# Patient Record
Sex: MALE | Race: WHITE | Employment: OTHER | URBAN - METROPOLITAN AREA
[De-identification: names, ages, dates, MRNs, and addresses within clinical notes are randomized per-mention and may not be internally consistent; named-entity substitution may affect disease eponyms.]

---

## 2018-06-08 ENCOUNTER — OFFICE VISIT (OUTPATIENT)
Dept: GERIATRIC MEDICINE | Age: 83
End: 2018-06-08

## 2018-06-08 VITALS
BODY MASS INDEX: 22.91 KG/M2 | HEART RATE: 75 BPM | RESPIRATION RATE: 18 BRPM | WEIGHT: 146 LBS | DIASTOLIC BLOOD PRESSURE: 62 MMHG | OXYGEN SATURATION: 97 % | HEIGHT: 67 IN | SYSTOLIC BLOOD PRESSURE: 112 MMHG | TEMPERATURE: 98.1 F

## 2018-06-08 VITALS
HEART RATE: 75 BPM | HEIGHT: 67 IN | DIASTOLIC BLOOD PRESSURE: 62 MMHG | BODY MASS INDEX: 22.91 KG/M2 | TEMPERATURE: 98.1 F | RESPIRATION RATE: 20 BRPM | OXYGEN SATURATION: 97 % | WEIGHT: 146 LBS | SYSTOLIC BLOOD PRESSURE: 112 MMHG

## 2018-06-08 DIAGNOSIS — N32.89 BLADDER SPASMS: ICD-10-CM

## 2018-06-08 DIAGNOSIS — Z76.89 ENCOUNTER TO ESTABLISH CARE WITH NEW DOCTOR: ICD-10-CM

## 2018-06-08 DIAGNOSIS — N30.01 ACUTE CYSTITIS WITH HEMATURIA: ICD-10-CM

## 2018-06-08 DIAGNOSIS — R79.9 ABNORMAL FINDING OF BLOOD CHEMISTRY: ICD-10-CM

## 2018-06-08 DIAGNOSIS — R81 GLUCOSE FOUND IN URINE ON EXAMINATION: ICD-10-CM

## 2018-06-08 DIAGNOSIS — R35.0 URINARY FREQUENCY: Primary | ICD-10-CM

## 2018-06-08 DIAGNOSIS — Z78.9 FULL CODE STATUS: ICD-10-CM

## 2018-06-08 DIAGNOSIS — R93.89 ABNORMAL FINDINGS ON DIAGNOSTIC IMAGING OF OTHER SPECIFIED BODY STRUCTURES: ICD-10-CM

## 2018-06-08 PROBLEM — I10 ESSENTIAL HYPERTENSION: Chronic | Status: ACTIVE | Noted: 2018-06-08

## 2018-06-08 PROBLEM — E78.2 MIXED HYPERLIPIDEMIA: Chronic | Status: ACTIVE | Noted: 2018-06-08

## 2018-06-08 PROBLEM — M19.90 CHRONIC OSTEOARTHRITIS: Chronic | Status: ACTIVE | Noted: 2018-06-08

## 2018-06-08 PROBLEM — I25.111 CORONARY ARTERY DISEASE INVOLVING NATIVE CORONARY ARTERY OF NATIVE HEART WITH ANGINA PECTORIS WITH DOCUMENTED SPASM (HCC): Chronic | Status: ACTIVE | Noted: 2018-06-08

## 2018-06-08 LAB
BILIRUB UR QL STRIP: NEGATIVE
GLUCOSE UR-MCNC: NORMAL MG/DL
KETONES P FAST UR STRIP-MCNC: NEGATIVE MG/DL
PH UR STRIP: 5 [PH] (ref 4.6–8)
PROT UR QL STRIP: NORMAL
SP GR UR STRIP: 1.01 (ref 1–1.03)
UA UROBILINOGEN AMB POC: NORMAL (ref 0.2–1)
URINALYSIS CLARITY POC: NORMAL
URINALYSIS COLOR POC: YELLOW
URINE BLOOD POC: NORMAL
URINE LEUKOCYTES POC: NORMAL
URINE NITRITES POC: NEGATIVE

## 2018-06-08 RX ORDER — GUAIFENESIN 100 MG/5ML
81 LIQUID (ML) ORAL DAILY
COMMUNITY
End: 2018-06-08 | Stop reason: CLARIF

## 2018-06-08 RX ORDER — PHENAZOPYRIDINE HYDROCHLORIDE 100 MG/1
100 TABLET, FILM COATED ORAL
Qty: 9 TAB | Refills: 0 | Status: SHIPPED | OUTPATIENT
Start: 2018-06-08 | End: 2018-06-11 | Stop reason: ALTCHOICE

## 2018-06-08 RX ORDER — CARVEDILOL 3.12 MG/1
TABLET ORAL 2 TIMES DAILY WITH MEALS
COMMUNITY
End: 2018-06-08 | Stop reason: CLARIF

## 2018-06-08 RX ORDER — NITROFURANTOIN 25; 75 MG/1; MG/1
100 CAPSULE ORAL 2 TIMES DAILY
Qty: 14 CAP | Refills: 0 | Status: SHIPPED | OUTPATIENT
Start: 2018-06-08 | End: 2018-06-15 | Stop reason: ALTCHOICE

## 2018-06-08 NOTE — PROGRESS NOTES
ADVISED PATIENT OF THE FOLLOWING HEALTH MAINTAINCE DUE  Health Maintenance Due   Topic Date Due    DTaP/Tdap/Td series (1 - Tdap) [birth year]/1949    ZOSTER VACCINE AGE 60>  11/10/1987    GLAUCOMA SCREENING Q2Y  01/10/1993    MEDICARE YEARLY EXAM  06/08/2018      Chief Complaint   Patient presents with    Urgency     Patient being seen for urinary frequency and burning. 1. Have you been to the ER, urgent care clinic since your last visit? Hospitalized since your last visit? Shantal Dong has been seen by outside provider, will request notes    2. Have you seen or consulted any other health care providers outside of the [address] [place] [address] since your last visit? Include any DEXA scan, mammography  or colon screening. No    3. Do you have an Advance Directive on file? no    4. Do you have a DNR on file? Full        Patient is accompanied by self and wife I have received verbal consent from Antonio Darden to discuss any/all medical information while they are present in the room.

## 2018-06-08 NOTE — MR AVS SNAPSHOT
[address] [address] 
[phone] Patient: Dwight Mahoney MRN: [id number] TNM:[id number] Visit Information Date & Time Provider Department Dept. Phone Encounter #  
 6/8/2018 10:00 AM Aleisha Abad, [address] [facility] [phone] [id number] Follow-up Instructions Return in about 3 days (around 6/11/2018) for with the NP, regarding your treatment/orders today. Upcoming Health Maintenance Date Due DTaP/Tdap/Td series (1 - Tdap) 1[birth year]10/1949 ZOSTER VACCINE AGE 60> 11/10/1987 GLAUCOMA SCREENING Q2Y 1/10/1993 MEDICARE YEARLY EXAM 6/8/2018 Influenza Age 5 to Adult 8/1/2018 Pneumococcal 65+ High/Highest Risk (2 of 2 - PPSV23) 8/3/2018 Allergies as of 6/8/2018  Review Complete On: 6/8/2018 By: Aleisha Abad NP Severity Noted Reaction Type Reactions Ace Inhibitors  10/13/2014    Hives Lisinopril  10/13/2014    Hives HIVES, ORAL SWELLING Current Immunizations  Never Reviewed No immunizations on file. Not reviewed this visit You Were Diagnosed With   
  
 Codes Comments Urinary frequency    -  Primary ICD-10-CM: R35.0 ICD-9-CM: 788.41 Encounter to establish care with new doctor     ICD-10-CM: Z76.89 
ICD-9-CM: V65.8 Bladder spasms     ICD-10-CM: N32.89 ICD-9-CM: 596.89 Acute cystitis with hematuria     ICD-10-CM: N30.01 
ICD-9-CM: 595.0 Glucose found in urine on examination     ICD-10-CM: R81 
ICD-9-CM: 791.5 Abnormal finding of blood chemistry     ICD-10-CM: R79.9 ICD-9-CM: 790.6 Abnormal findings on diagnostic imaging of other specified body structures     ICD-10-CM: R93.8 ICD-9-CM: 793.99 Vitals BP Pulse Temp Resp Height(growth percentile) Weight(growth percentile) 112/62 (BP 1 Location: Left arm, BP Patient Position: Sitting) 75 98.1 °F (36.7 °C) (Oral) 20 5' 6.93\" (1.7 m) 146 lb (66.2 kg) SpO2 BMI Smoking Status 97% 22.91 kg/m2 Never Smoker BMI and BSA Data Body Mass Index Body Surface Area  
 22.91 kg/m 2 1.77 m 2 Preferred Pharmacy Pharmacy Name Phone [facility] [address], [facility] [phone] Your Updated Medication List  
  
   
This list is accurate as of 6/8/18  2:32 PM.  Always use your most recent med list.  
  
  
  
  
 acetaminophen-codeine 300-30 mg per tablet Commonly known as:  TYLENOL-CODEINE #3 Take 2 Tabs by mouth every four (4) hours as needed for Pain. Max Daily Amount: 12 Tabs. aspirin delayed-release 81 mg tablet Take 81 mg by mouth daily. atorvastatin 40 mg tablet Commonly known as:  LIPITOR Take 40 mg by mouth every evening. AVODART 0.5 mg capsule Generic drug:  dutasteride Take 0.5 mg by mouth daily. CARDURA 8 mg tablet Generic drug:  doxazosin Take 4 mg by mouth daily. AT BEDTIME  
  
 carvedilol 3.125 mg tablet Commonly known as:  Layo Dole Take  by mouth two (2) times daily (with meals). CLARITIN 10 mg tablet Generic drug:  loratadine Take 10 mg by mouth daily as needed for Allergies. losartan 100 mg tablet Commonly known as:  COZAAR Take 100 mg by mouth daily. nitrofurantoin (macrocrystal-monohydrate) 100 mg capsule Commonly known as:  MACROBID Take 1 Cap by mouth two (2) times a day for 7 days. Indications: BACTERIAL URINARY TRACT INFECTION  
  
 phenazopyridine 100 mg tablet Commonly known as:  PYRIDIUM Take 1 Tab by mouth three (3) times daily (after meals) for 3 days. Indications: URINARY TRACT IRRITATION Prescriptions Sent to Pharmacy Refills  
 phenazopyridine (PYRIDIUM) 100 mg tablet 0 Sig: Take 1 Tab by mouth three (3) times daily (after meals) for 3 days. Indications: URINARY TRACT IRRITATION  Class: Normal  
 Pharmacy: [facility] [address] [place] [address] of [address] [place] [address] Ph #: [phone] Route: Oral  
 nitrofurantoin, macrocrystal-monohydrate, (MACROBID) 100 mg capsule 0 Sig: Take 1 Cap by mouth two (2) times a day for 7 days. Indications: BACTERIAL URINARY TRACT INFECTION Class: Normal  
 Pharmacy: [facility] [address] [place] [address], [facility] of [address] [place] [address] Ph #: [phone] Route: Oral  
  
We Performed the Following AMB POC URINALYSIS DIP STICK MANUAL W/O MICRO [07423 CPT(R)] AMMONIA [id number] CPT(R)] CBC WITH AUTOMATED DIFF [86991 CPT(R)] COLLECTION VENOUS BLOOD,VENIPUNCTURE [id number] CPT(R)] HEMOGLOBIN A1C WITH EAG [64206 CPT(R)] LACTIC ACID [id number] CPT(R)] LIPID PANEL [64324 CPT(R)] METABOLIC PANEL, COMPREHENSIVE [65420 CPT(R)] TSH REFLEX TO T4 [52240 CPT(R)] UA/M W/RFLX CULTURE, ROUTINE [[id number] Custom] Follow-up Instructions Return in about 3 days (around 6/11/2018) for with the NP, regarding your treatment/orders today. Patient Instructions Urinary Tract Infections in Men: Care Instructions Your Care Instructions A urinary tract infection, or UTI, is a general term for an infection anywhere between the kidneys and the tip of the penis. UTIs can also be a result of a prostate problem. Most cause pain or burning when you urinate. Most UTIs are caused by bacteria and can be cured with antibiotics. It is important to complete your treatment so that the infection does not get worse. Follow-up care is a key part of your treatment and safety. Be sure to make and go to all appointments, and call your doctor if you are having problems. It's also a good idea to know your test results and keep a list of the medicines you take. How can you care for yourself at home? · Take your antibiotics as prescribed.  Do not stop taking them just because you feel better. You need to take the full course of antibiotics. · Take your medicines exactly as prescribed. Your doctor may have prescribed a medicine, such as phenazopyridine (Pyridium), to help relieve pain when you urinate. This turns your urine orange. You may stop taking it when your symptoms get better. But be sure to take all of your antibiotics, which treat the infection. · Drink extra water for the next day or two. This will help make the urine less concentrated and help wash out the bacteria causing the infection. (If you have kidney, heart, or liver disease and have to limit your fluids, talk with your doctor before you increase your fluid intake.) · Avoid drinks that are carbonated or have caffeine. They can irritate the bladder. · Urinate often. Try to empty your bladder each time. · To relieve pain, take a hot bath or lay a heating pad (set on low) over your lower belly or genital area. Never go to sleep with a heating pad in place. To help prevent UTIs · Drink plenty of fluids, enough so that your urine is light yellow or clear like water. If you have kidney, heart, or liver disease and have to limit fluids, talk with your doctor before you increase the amount of fluids you drink. · Urinate when you have the urge. Do not hold your urine for a long time. Urinate before you go to sleep. · Keep your penis clean. Catheter care If you have a drainage tube (catheter) in place, the following steps will help you care for it. · Always wash your hands before and after touching your catheter. · Check the area around the urethra for inflammation or signs of infection. Signs of infection include irritated, swollen, red, or tender skin, or pus around the catheter. · Clean the area around the catheter with soap and water two times a day. Dry with a clean towel afterward. · Do not apply powder or lotion to the skin around the catheter.  
To empty the urine collection bag 
 · Wash your hands with soap and water. · Without touching the drain spout, remove the spout from its sleeve at the bottom of the collection bag. Open the valve on the spout. · Let the urine flow out of the bag and into the toilet or a container. Do not let the tubing or drain spout touch anything. · After you empty the bag, clean the end of the drain spout with tissue and water. Close the valve and put the drain spout back into its sleeve at the bottom of the collection bag. · Wash your hands with soap and water. When should you call for help? Call your doctor now or seek immediate medical care if: 
? · Symptoms such as a fever, chills, nausea, or vomiting get worse or happen for the first time. ? · You have new pain in your back just below your rib cage. This is called flank pain. ? · There is new blood or pus in your urine. ? · You are not able to take or keep down your antibiotics. ? Watch closely for changes in your health, and be sure to contact your doctor if: 
? · You are not getting better after taking an antibiotic for 2 days. ? · Your symptoms go away but then come back. Where can you learn more? Go to http://thomas-jason.info/. Enter V219 in the search box to learn more about \"Urinary Tract Infections in Men: Care Instructions. \" Current as of: May 12, 2017 Content Version: 11.4 © 8033-2295 [employer]. Care instructions adapted under license by [employer] (which disclaims liability or warranty for this information). If you have questions about a medical condition or this instruction, always ask your healthcare professional. [place] [address] any warranty or liability for your use of this information. Introducing [facility] & HEALTH SERVICES! [facility] introduces [employer] patient portal. Now you can access parts of your medical record, email your doctor's office, and request medication refills online. 1. In your internet browser, go to [email or web address] 2. Click on the First Time User? Click Here link in the Sign In box. You will see the New Member Sign Up page. 3. Enter your [employer] Access Code exactly as it appears below. You will not need to use this code after youve completed the sign-up process. If you do not sign up before the expiration date, you must request a new code. · [employer] Access Code: [email or web address] Expires: 9/6/2018 11:43 AM 
 
4. Enter the last four digits of your Social Security Number (xxxx) and Date of Birth (mm/dd/yyyy) as indicated and click Submit. You will be taken to the next sign-up page. 5. Create a [employer] ID. This will be your [employer] login ID and cannot be changed, so think of one that is secure and easy to remember. 6. Create a [employer] password. You can change your password at any time. 7. Enter your Password Reset Question and Answer. This can be used at a later time if you forget your password. 8. Enter your e-mail address. You will receive e-mail notification when new information is available in [address] 9. Click Sign Up. You can now view and download portions of your medical record. 10. Click the Download Summary menu link to download a portable copy of your medical information. If you have questions, please visit the Frequently Asked Questions section of the [employer] website. Remember, [employer] is NOT to be used for urgent needs. For medical emergencies, dial 911. Now available from your iPhone and Android! Please provide this summary of care documentation to your next provider. If you have any questions after today's visit, please call [phone].

## 2018-06-08 NOTE — LETTER
6/8/2018 2:16 PM 
 
Mr. Morro Nino [address] Rowena Johnson E - [address] [facility] [address] Discharge Instructions/Plan of Care 06/08/18 
- If you do not receive your medications it is your responsibility to let us know or contact your pharmacy. Any questions do not hesitate to call Olga aKur [facility] Nurse at [phone]. Discontinued Medication/Treatment: No medications have been discontinued today. Treatment Plan: 1. Urinary frequency 2. Encounter to establish care with new doctor 3. Bladder spasms 4. Acute cystitis with hematuria 5. Glucose found in urine on examination 6. Abnormal finding of blood chemistry 7. Abnormal findings on diagnostic imaging of other specified body structures Medications Ordered - [facility] will deliver to [facility] 6/8/18 phenazopyridine (PYRIDIUM) 100 mg tablet  (bladder spasm medication) Sig: Take 1 Tab by mouth three (3) times daily (after meals) for 3 days. Indications: URINARY TRACT IRRITATION/SPASMS Dispense:  9 Tab  
   
nitrofurantoin, macrocrystal-monohydrate, (MACROBID) 100 mg capsule  (antibiotic) Sig: Take 1 Cap by mouth two (2) times a day for 7 days. Indications: BACTERIAL URINARY TRACT INFECTION Dispense:  14 Cap Please drink and flush with oral fluids as you are mildly dehydrated and need to drink more. Return Visit/Follow Up:   
 
Return on Monday 6/11/2018 for follow up from today's visit as well as we can complete your Medicare Wellness Visit that is due. Please bring a copy of your advanced directives, living will, or current do not resuscitate order you have on hand. We appreciate you allowing us to participate in your health care.  
Eder Mccain NP

## 2018-06-08 NOTE — PATIENT INSTRUCTIONS

## 2018-06-09 LAB
ALBUMIN SERPL-MCNC: 3.8 G/DL (ref 3.2–4.6)
ALBUMIN/GLOB SERPL: 1.7 {RATIO} (ref 1.2–2.2)
ALP SERPL-CCNC: 106 IU/L (ref 39–117)
ALT SERPL-CCNC: 26 IU/L (ref 0–44)
AMMONIA PLAS-MCNC: NORMAL UG/DL (ref 27–102)
AST SERPL-CCNC: 24 IU/L (ref 0–40)
BASOPHILS # BLD AUTO: 0 X10E3/UL (ref 0–0.2)
BASOPHILS NFR BLD AUTO: 0 %
BILIRUB SERPL-MCNC: 0.9 MG/DL (ref 0–1.2)
BUN SERPL-MCNC: 21 MG/DL (ref 10–36)
BUN/CREAT SERPL: 23 (ref 10–24)
CALCIUM SERPL-MCNC: 9 MG/DL (ref 8.6–10.2)
CHLORIDE SERPL-SCNC: 98 MMOL/L (ref 96–106)
CHOLEST SERPL-MCNC: 99 MG/DL (ref 100–199)
CO2 SERPL-SCNC: 23 MMOL/L (ref 18–29)
CREAT SERPL-MCNC: 0.92 MG/DL (ref 0.76–1.27)
EOSINOPHIL # BLD AUTO: 0.1 X10E3/UL (ref 0–0.4)
EOSINOPHIL NFR BLD AUTO: 2 %
ERYTHROCYTE [DISTWIDTH] IN BLOOD BY AUTOMATED COUNT: 13.3 % (ref 12.3–15.4)
EST. AVERAGE GLUCOSE BLD GHB EST-MCNC: 137 MG/DL
GFR SERPLBLD CREATININE-BSD FMLA CKD-EPI: 73 ML/MIN/1.73
GFR SERPLBLD CREATININE-BSD FMLA CKD-EPI: 84 ML/MIN/1.73
GLOBULIN SER CALC-MCNC: 2.2 G/DL (ref 1.5–4.5)
GLUCOSE SERPL-MCNC: 192 MG/DL (ref 65–99)
HBA1C MFR BLD: 6.4 % (ref 4.8–5.6)
HCT VFR BLD AUTO: 40.4 % (ref 37.5–51)
HDLC SERPL-MCNC: 34 MG/DL
HGB BLD-MCNC: 13.2 G/DL (ref 13–17.7)
IMM GRANULOCYTES # BLD: 0 X10E3/UL (ref 0–0.1)
IMM GRANULOCYTES NFR BLD: 0 %
LDLC SERPL CALC-MCNC: 52 MG/DL (ref 0–99)
LYMPHOCYTES # BLD AUTO: 0.7 X10E3/UL (ref 0.7–3.1)
LYMPHOCYTES NFR BLD AUTO: 12 %
MCH RBC QN AUTO: 29.7 PG (ref 26.6–33)
MCHC RBC AUTO-ENTMCNC: 32.7 G/DL (ref 31.5–35.7)
MCV RBC AUTO: 91 FL (ref 79–97)
MONOCYTES # BLD AUTO: 0.7 X10E3/UL (ref 0.1–0.9)
MONOCYTES NFR BLD AUTO: 13 %
NEUTROPHILS # BLD AUTO: 4.1 X10E3/UL (ref 1.4–7)
NEUTROPHILS NFR BLD AUTO: 73 %
PLATELET # BLD AUTO: 276 X10E3/UL (ref 150–379)
POTASSIUM SERPL-SCNC: 4.4 MMOL/L (ref 3.5–5.2)
PROT SERPL-MCNC: 6 G/DL (ref 6–8.5)
RBC # BLD AUTO: 4.45 X10E6/UL (ref 4.14–5.8)
SODIUM SERPL-SCNC: 135 MMOL/L (ref 134–144)
TRIGL SERPL-MCNC: 66 MG/DL (ref 0–149)
TSH SERPL DL<=0.005 MIU/L-ACNC: 1.58 UIU/ML (ref 0.45–4.5)
VLDLC SERPL CALC-MCNC: 13 MG/DL (ref 5–40)
WBC # BLD AUTO: 5.7 X10E3/UL (ref 3.4–10.8)

## 2018-06-11 ENCOUNTER — OFFICE VISIT (OUTPATIENT)
Dept: GERIATRIC MEDICINE | Age: 83
End: 2018-06-11

## 2018-06-11 VITALS
WEIGHT: 146 LBS | DIASTOLIC BLOOD PRESSURE: 78 MMHG | OXYGEN SATURATION: 97 % | HEIGHT: 67 IN | HEART RATE: 85 BPM | SYSTOLIC BLOOD PRESSURE: 130 MMHG | RESPIRATION RATE: 20 BRPM | BODY MASS INDEX: 22.91 KG/M2 | TEMPERATURE: 97.7 F

## 2018-06-11 DIAGNOSIS — R35.0 URINARY FREQUENCY: Primary | ICD-10-CM

## 2018-06-11 DIAGNOSIS — N30.01 ACUTE CYSTITIS WITH HEMATURIA: ICD-10-CM

## 2018-06-11 DIAGNOSIS — Z71.2 ENCOUNTER TO DISCUSS TEST RESULTS: ICD-10-CM

## 2018-06-11 DIAGNOSIS — R81 GLUCOSE FOUND IN URINE ON EXAMINATION: ICD-10-CM

## 2018-06-11 DIAGNOSIS — Z71.89 ENCOUNTER FOR FAMILY CONFERENCE WITH PATIENT PRESENT: ICD-10-CM

## 2018-06-11 DIAGNOSIS — Z71.89 ACP (ADVANCE CARE PLANNING): ICD-10-CM

## 2018-06-11 DIAGNOSIS — R79.9 ABNORMAL FINDING OF BLOOD CHEMISTRY: ICD-10-CM

## 2018-06-11 DIAGNOSIS — Z00.00 MEDICARE ANNUAL WELLNESS VISIT, SUBSEQUENT: ICD-10-CM

## 2018-06-11 DIAGNOSIS — Z78.9 FULL CODE STATUS: ICD-10-CM

## 2018-06-11 LAB
BILIRUB UR QL STRIP: NEGATIVE
GLUCOSE UR-MCNC: NORMAL MG/DL
KETONES P FAST UR STRIP-MCNC: NEGATIVE MG/DL
LACTATE SERPL-MCNC: 9.5 MG/DL (ref 4.8–25.7)
PH UR STRIP: 5 [PH] (ref 4.6–8)
PROT UR QL STRIP: NORMAL
SP GR UR STRIP: 1.01 (ref 1–1.03)
UA UROBILINOGEN AMB POC: NORMAL (ref 0.2–1)
URINALYSIS CLARITY POC: CLEAR
URINALYSIS COLOR POC: NORMAL
URINE BLOOD POC: NORMAL
URINE LEUKOCYTES POC: NEGATIVE
URINE NITRITES POC: POSITIVE

## 2018-06-11 RX ORDER — SAME BUTANEDISULFONATE/BETAINE 400-600 MG
250 POWDER IN PACKET (EA) ORAL DAILY
Qty: 90 CAP | Refills: 2 | Status: SHIPPED | OUTPATIENT
Start: 2018-06-11 | End: 2019-03-29 | Stop reason: ALTCHOICE

## 2018-06-11 NOTE — LETTER
6/11/2018 1:22 PM 
 
Patient:  Patricia Avelar YOB: 1928 Date of Visit: 6/11/2018 Dear MD [address] [facility] [address] [address] VIA Facsimile: [phone] Claude Prima, MD 
[address] 
[facility] [address] [address] VIA In Basket Karen Bender MD 
[address] [facility] 7 [address] VIA Facsimile: [phone] 
 : 
 
 
I am seeing Mr. Caryn Beauchamp for primary care here in [place], [place]. Below are the relevant portions  
 
of my assessment and plan of care. If you have questions, please do not hesitate to call me. I look forward to following Mr. Craft along  
 
with you.  
 
 
 
Sincerely, 
 
 
Vel Killian NP

## 2018-06-11 NOTE — PROGRESS NOTES
Reason for Visit:      Chief Complaint   Patient presents with   [facility] Visit     Patient here for medicare wellness visit.  Follow-up     Patient here for follow up with UTI     History of Present Illness:   Mayra Jamison is a 80 y.o. male geriatric patient who presents today for scheduled follow up after Friday's visit when he was found to be moderately dehydrated with a raging urinary tract infection. I am still awaiting medical records to piece together his medical history and care as it appears to be complex. He has a history of bladder cancer and recently in May 2018 had a cystoscopy done at [facility]. I do not have the results of this test at this time time. Mr. Sol Elder, his wife, and his daughter Bronwyn Starkey are present Vernon Weeks is via phone conference) for this visit. Urinary Frequency: per patient he feels better. He states the urgency has slowed down and he can now make it about every 3 hours to the bathroom. He still has not noted any blood or abnormalities with the visual appearance of the urine except for the darkening of color due to the pyridium. He has been taking the Macrobid without side effects or harm. I have advised him to continue with the medication until the culture return and then if need be we will change him to something that is noted on the culture but this should do the trick. He states he is getting more sleep now and seems to be getting better. His wife reports that he is still up multiple items per night and he has complained this weekend of a reduction in his stream. His daughter is present on the phone and reports a cystoscopy from May 2018 results showed to be inconclusive with a \"red\" spot noted on exam. Directions were to \"watch\" it and return in 3 months for another test. I have requested these records from [facility] and Bronwyn Aspenkathryn the daughter is trying to get him in to see dr. Reena Patel as soon as possible.      Laboratory Results Reviewed: CBC is stable without anemia, infection or dehydration. CMP shows an elevated peripheral blood glucose at 192. He was not fasting for this sample. Other values are stable without kidney insufficiency, liver, or electrolyte abnormalities. I would like to comapre them to previous labs for a better undertsanding of his chronic illnesses, we are still awaiting records that were requested on Friday. Thyroid is functioning well without concern. Lipids are low. He is on Lipitor 40 mg daily and I will check with Dr. Ebonie Khan (cardiologist) on hopefully reducing the statin as his coronary artery disease has been stable for 25+ years and the effects of statin on this population is sometimes worse then its benefits. I will leave this up to Dr. Ebonie Khan to advised. Hemoglobin A 1 C is elevated but not bad. It is showing some increase but with his age and medical conditions he is not a candidate for treatment at this time as we can cause him to be more weakness and fall if we set his target number lower. He is spilling glucose in his urine still as I did a POC dipstick in office and he has 1000+ glucose in urine. I will bring this up to Dr. Chris Alexander or whomever is treating him at [facility] as I have left a message for a provider to call me back. I have recommended use of a probiotic daily as this could help with bowels and urinary health. He declines using yogurt, cottage cheese, ect. .. He and his wife request a pill form. I will order Florastor to be delivered today from the pharmacy. I have asked that he return in 1 month unless he has further issues. I have ordered a dietary consult here at [facility] to help with navigating through the food choices in the dinning mcdaniels. They are open to having this meeting. Discussed the importance of not over medicating him with a medication for each ailment and strategically choosing the treatments we use to benefit him on the quality of life. They agree. Diagnosis/Treatment Plan:     The following orders have been placed for treatment of the diagnoses above:    ICD-10-CM ICD-9-CM    1. Urinary frequency R35.0 788.41 REFERRAL TO DIETITIAN      Saccharomyces boulardii (FLORASTOR) 250 mg capsule      AMB POC URINALYSIS DIP STICK MANUAL W/O MICRO   2. Acute cystitis with hematuria N30.01 595.0 REFERRAL TO DIETITIAN      Saccharomyces boulardii (FLORASTOR) 250 mg capsule      AMB POC URINALYSIS DIP STICK MANUAL W/O MICRO   3. Glucose found in urine on examination R81 791.5 REFERRAL TO DIETITIAN      Saccharomyces boulardii (FLORASTOR) 250 mg capsule      AMB POC URINALYSIS DIP STICK MANUAL W/O MICRO   4. Abnormal finding of blood chemistry  R79.9 790.6 REFERRAL TO DIETITIAN      Saccharomyces boulardii (FLORASTOR) 250 mg capsule      AMB POC URINALYSIS DIP STICK MANUAL W/O MICRO   5. Encounter to discuss test results Z71.89 V65.49 REFERRAL TO DIETITIAN      Saccharomyces boulardii (FLORASTOR) 250 mg capsule      AMB POC URINALYSIS DIP STICK MANUAL W/O MICRO   6. Encounter for family conference with patient present Z71.89 V65.49 FULL CODE      REFERRAL TO DIETITIAN      Saccharomyces boulardii (FLORASTOR) 250 mg capsule      AMB POC URINALYSIS DIP STICK MANUAL W/O MICRO   7. Medicare annual wellness visit, subsequent Z00.00 V70.0 FULL CODE   8. ACP (advance care planning) Z71.89 V65.49 FULL CODE   9. Full code status Z78.9 V49.89 FULL CODE      The following medication/treatments have been discontinued by the provider today:   Medications Discontinued During This Encounter   Medication Reason    phenazopyridine (PYRIDIUM) 100 mg tablet Therapy Completed       Follow Up: Follow-up Disposition:  Return in about 1 month (around 7/11/2018). Subjective: Allergies   Allergen Reactions    Ace Inhibitors Hives    Lisinopril Hives     HIVES, ORAL SWELLING     Prior to Admission medications    Medication Sig Start Date End Date Taking?  Authorizing Provider   Saccharomyces boulardii (FLORASTOR) 250 mg capsule Take 1 Cap by mouth daily. 6/11/18  Yes Kristal De Santiago NP   nitrofurantoin, macrocrystal-monohydrate, (MACROBID) 100 mg capsule Take 1 Cap by mouth two (2) times a day for 7 days. Indications: BACTERIAL URINARY TRACT INFECTION 6/8/18 6/15/18 Yes Kristal De Santiago NP   carvedilol (COREG) 3.125 mg tablet Take  by mouth two (2) times daily (with meals). Yes Historical Provider   atorvastatin (LIPITOR) 40 mg tablet Take 40 mg by mouth every evening. Yes Historical Provider   losartan (COZAAR) 100 mg tablet Take 100 mg by mouth daily. Yes Historical Provider   aspirin delayed-release 81 mg tablet Take 81 mg by mouth daily. Yes Historical Provider   doxazosin (CARDURA) 8 mg tablet Take 4 mg by mouth daily. AT BEDTIME   Yes Historical Provider   dutaseride (AVODART) 0.5 mg capsule Take 0.5 mg by mouth daily. Yes Historical Provider   acetaminophen-codeine (TYLENOL-CODEINE #3) 300-30 mg per tablet Take 2 Tabs by mouth every four (4) hours as needed for Pain. Max Daily Amount: 12 Tabs. 12/7/15   Kiah Sage III, MD   loratadine (CLARITIN) 10 mg tablet Take 10 mg by mouth daily as needed for Allergies. Historical Provider     Past Medical History:   Diagnosis Date    Arthritis     CAD (coronary artery disease)     HX CABG    Cancer ([facility] [address]     BLADDER    Chronic pain     Essential hypertension 6/8/2018    GERD (gastroesophageal reflux disease)     Hypercholesterolemia     Osteoarthritis of left knee 2/1/2011    Other ill-defined conditions(799.89)     BPH      Past Surgical History:   Procedure Laterality Date    CARDIAC SURG PROCEDURE UNLIST  1999    CABG    HX COLONOSCOPY      HX GI      ESOPH DIL.     HX HEENT      TONSILLECTOMY    HX ORTHOPAEDIC  2008    R KNEE REPLACE    HX ORTHOPAEDIC  2004    ARTHROSCOPY R KNEE    HX ORTHOPAEDIC  2002    ARTHROSCOPY L KNEE    HX OTHER SURGICAL      ENDOSCOPY-X2    HX PROSTATECTOMY      HX TURP  2014, 2015    HX UROLOGICAL  11/02/2015 Transurethral resection of bladder tumor    TOTAL KNEE ARTHROPLASTY  2011    LEFT      Social History   Substance Use Topics    Smoking status: Never Smoker    Smokeless tobacco: Never Used    Alcohol use 2.0 oz/week     4 Glasses of wine per week      Comment: 4-5 OZ WINE DAILY      Family History   Problem Relation Age of Onset    Heart Disease Father     Cancer Mother     Anesth Problems Neg Hx       Latest Laboratory Results:     Lab Results   Component Value Date/Time    WBC 5.7 06/08/2018 02:33 PM    HGB 13.2 06/08/2018 02:33 PM    HCT 40.4 06/08/2018 02:33 PM    PLATELET 291 12/55/7736 02:33 PM    MCV 91 06/08/2018 02:33 PM     Lab Results   Component Value Date/Time    Sodium 135 06/08/2018 02:33 PM    Potassium 4.4 06/08/2018 02:33 PM    Chloride 98 06/08/2018 02:33 PM    CO2 23 06/08/2018 02:33 PM    Anion gap 8 10/15/2015 02:55 PM    Glucose 192 (H) 06/08/2018 02:33 PM    BUN 21 06/08/2018 02:33 PM    Creatinine 0.92 06/08/2018 02:33 PM    BUN/Creatinine ratio 23 06/08/2018 02:33 PM    GFR est AA 84 06/08/2018 02:33 PM    GFR est non-AA 73 06/08/2018 02:33 PM    Calcium 9.0 06/08/2018 02:33 PM    Bilirubin, total 0.9 06/08/2018 02:33 PM    AST (SGOT) 24 06/08/2018 02:33 PM    Alk.  phosphatase 106 06/08/2018 02:33 PM    Protein, total 6.0 06/08/2018 02:33 PM    Albumin 3.8 06/08/2018 02:33 PM    Globulin 2.5 10/15/2015 02:55 PM    A-G Ratio 1.7 06/08/2018 02:33 PM    ALT (SGPT) 26 06/08/2018 02:33 PM     Lab Results   Component Value Date/Time    Cholesterol, total 99 (L) 06/08/2018 02:33 PM    HDL Cholesterol 34 (L) 06/08/2018 02:33 PM    LDL, calculated 52 06/08/2018 02:33 PM    VLDL, calculated 13 06/08/2018 02:33 PM    Triglyceride 66 06/08/2018 02:33 PM     Lab Results   Component Value Date/Time    TSH 1.580 06/08/2018 02:33 PM     Results for orders placed or performed in visit on 06/11/18   AMB POC URINALYSIS DIP STICK MANUAL W/O MICRO     Status: None   Result Value Ref Range Status Color (UA POC) Orange  Final    Clarity (UA POC) Clear  Final    Glucose (UA POC) 4+ Negative Final    Bilirubin (UA POC) Negative Negative Final    Ketones (UA POC) Negative Negative Final    Specific gravity (UA POC) 1.010 1.001 - 1.035 Final    Blood (UA POC) 3+ Negative Final    pH (UA POC) 5.0 4.6 - 8.0 Final    Protein (UA POC) 2+ Negative Final    Urobilinogen (UA POC) 0.2 mg/dL 0.2 - 1 Final    Nitrites (UA POC) Positive Negative Final    Leukocyte esterase (UA POC) Negative Negative Final       Objective:   CODE STATUS: Full  Vitals:    06/11/18 1046   BP: 130/78   Pulse: 85   Resp: 20   Temp: 97.7 °F (36.5 °C)   SpO2: 97%   Weight: 146 lb (66.2 kg)   Height: 5' 6.93\" (1.7 m)     Wt Readings from Last 3 Encounters:   06/11/18 146 lb (66.2 kg)   06/08/18 146 lb (66.2 kg)   06/08/18 146 lb (66.2 kg)     BP Readings from Last 3 Encounters:   06/11/18 130/78   06/08/18 112/62   06/08/18 112/62     Review of Systems   Constitutional: Negative for activity change, appetite change, fatigue and fever. HENT: Negative for congestion, dental problem, hearing loss, postnasal drip, sinus pressure, sneezing, sore throat and trouble swallowing. Eyes: Negative for discharge, redness and visual disturbance. Respiratory: Negative for apnea, cough, chest tightness, shortness of breath and wheezing. Cardiovascular: Negative for chest pain, palpitations and leg swelling. Gastrointestinal: Negative for abdominal distention, abdominal pain, blood in stool, constipation, diarrhea, nausea and vomiting. Endocrine: Positive for polydipsia and polyuria. Genitourinary: Positive for difficulty urinating, frequency and urgency. Negative for dysuria and flank pain. Musculoskeletal: Positive for arthralgias, back pain, gait problem and myalgias. Negative for joint swelling and neck pain. Skin: Negative for color change, pallor, rash and wound.    Allergic/Immunologic: Negative for environmental allergies and food allergies. Neurological: Negative for dizziness, tremors, weakness, light-headedness, numbness and headaches. Hematological: Negative for adenopathy. Psychiatric/Behavioral: Negative for agitation, confusion and sleep disturbance. The patient is not nervous/anxious. Physical Exam   Constitutional: He is oriented to person, place, and time. Vital signs are normal. He appears malnourished. He appears to not be writhing in pain and not dehydrated. He appears healthy. Non-toxic appearance. He does not have a sickly appearance. No distress. Thin, frail, fragile in appearing, elderly,  male. Presents with his wife. Mild flight of ideas and thoughts. HENT:   Head: Normocephalic and atraumatic. Right Ear: Tympanic membrane, external ear and ear canal normal. Decreased hearing is noted. Left Ear: Tympanic membrane, external ear and ear canal normal. Decreased hearing is noted. Nose: Nose normal.   Mouth/Throat: Uvula is midline, oropharynx is clear and moist and mucous membranes are normal. Mucous membranes are not pale, not dry and not cyanotic. No oral lesions. No uvula swelling. No posterior oropharyngeal edema or posterior oropharyngeal erythema. Neck: Trachea normal. No JVD present. Spinous process tenderness and muscular tenderness present. Rigidity present. Decreased range of motion present. No thyromegaly present. Cardiovascular: S1 normal, S2 normal, intact distal pulses and normal pulses. A regularly irregular rhythm present. Bradycardia present. Exam reveals distant heart sounds. Exam reveals no gallop and no friction rub. No murmur heard. No extremity edema is present during today's exam.    Pulmonary/Chest: Effort normal and breath sounds normal.   Abdominal: Soft. Normal appearance and bowel sounds are normal. There is no hepatosplenomegaly. There is no tenderness. There is no CVA tenderness. Musculoskeletal:   Generalized chronic extremity weakness is present. Lymphadenopathy:        Head (right side): No submandibular, no tonsillar and no posterior auricular adenopathy present. Head (left side): No submandibular, no tonsillar and no posterior auricular adenopathy present. He has no cervical adenopathy. Neurological: He is alert and oriented to person, place, and time. He has normal reflexes and intact cranial nerves. He displays weakness, atrophy and abnormal stance. A sensory deficit is present. He exhibits abnormal muscle tone. Coordination and gait abnormal. GCS score is 15. Skin: Skin is warm, dry and intact. No bruising and no rash noted. He is not diaphoretic. No cyanosis. No pallor. Nails show no clubbing. Psychiatric: Mood, memory, affect and judgment normal.   Baseline mood and affect unchanged from normal.      Disclaimer:   Advised Daniel Craft to call back or return to office if symptoms worsen/change/persist. Discussed expected course/resolution/complications of diagnosis in detail with patient. Medication risks/benefits/costs/interactions/alternatives discussed with patient and he was given an after visit summary which includes diagnoses, current medications, & vitals. Daniel Craft expressed understanding with the diagnosis and plan.

## 2018-06-11 NOTE — PROGRESS NOTES
ADVISED PATIENT OF THE FOLLOWING HEALTH MAINTAINCE DUE  Health Maintenance Due   Topic Date Due    DTaP/Tdap/Td series (1 - Tdap) 01/10/1949    ZOSTER VACCINE AGE 60>  11/10/1987    GLAUCOMA SCREENING Q2Y  01/10/1993    MEDICARE YEARLY EXAM  06/08/2018      Chief Complaint   Patient presents with   Lisalinus Almeida Annual Wellness Visit     Patient here for medicare wellness visit.  Follow-up     Patient here for follow up with UTI       1. Have you been to the ER, urgent care clinic since your last visit? Hospitalized since your last visit? No    2. Have you seen or consulted any other health care providers outside of the [facility] since your last visit? Include any DEXA scan, mammography  or colon screening. No    3. Do you have an Advance Directive on file? no    4. Do you have a DNR on file? Full        Patient is accompanied by self and wife I have received verbal consent from Antonio Darden to discuss any/all medical information while they are present in the room.       Results for orders placed or performed in visit on 06/11/18   AMB POC URINALYSIS DIP STICK MANUAL W/O MICRO   Result Value Ref Range    Color (UA POC) Orange     Clarity (UA POC) Clear     Glucose (UA POC) 4+ Negative    Bilirubin (UA POC) Negative Negative    Ketones (UA POC) Negative Negative    Specific gravity (UA POC) 1.010 1.001 - 1.035    Blood (UA POC) 3+ Negative    pH (UA POC) 5.0 4.6 - 8.0    Protein (UA POC) 2+ Negative    Urobilinogen (UA POC) 0.2 mg/dL 0.2 - 1    Nitrites (UA POC) Positive Negative    Leukocyte esterase (UA POC) Negative Negative

## 2018-06-11 NOTE — PROGRESS NOTES
Reason For Visit:   Brianne Toledo is a 80 y.o. male who presents for an annual Medicare Wellness Visit. Patient History:   PSH: Reviewed with patient  Past Surgical History:   Procedure Laterality Date    CARDIAC SURG PROCEDURE UNLIST  1999    CABG    HX COLONOSCOPY      HX GI      ESOPH DIL.  HX HEENT      TONSILLECTOMY    HX ORTHOPAEDIC  2008    R KNEE REPLACE    HX ORTHOPAEDIC  2004    ARTHROSCOPY R KNEE    HX ORTHOPAEDIC  2002    ARTHROSCOPY L KNEE    HX OTHER SURGICAL      ENDOSCOPY-X2    HX PROSTATECTOMY      HX TURP  2014, 2015    HX UROLOGICAL  11/02/2015    Transurethral resection of bladder tumor    TOTAL KNEE ARTHROPLASTY  2011    LEFT      SH: Reviewed with patient  Social History   Substance Use Topics    Smoking status: Never Smoker    Smokeless tobacco: Never Used    Alcohol use 2.0 oz/week     4 Glasses of wine per week      Comment: 4-5 OZ WINE DAILY     FH: Reviewed with patient  Family History   Problem Relation Age of Onset    Heart Disease Father     Cancer Mother     Anesth Problems Neg Hx      Medications/Allergies: Reviewed with patient. Current Outpatient Prescriptions on File Prior to Visit   Medication Sig Dispense Refill    nitrofurantoin, macrocrystal-monohydrate, (MACROBID) 100 mg capsule Take 1 Cap by mouth two (2) times a day for 7 days. Indications: BACTERIAL URINARY TRACT INFECTION 14 Cap 0    carvedilol (COREG) 3.125 mg tablet Take  by mouth two (2) times daily (with meals).  atorvastatin (LIPITOR) 40 mg tablet Take 40 mg by mouth every evening.  losartan (COZAAR) 100 mg tablet Take 100 mg by mouth daily.  aspirin delayed-release 81 mg tablet Take 81 mg by mouth daily.  doxazosin (CARDURA) 8 mg tablet Take 4 mg by mouth daily. AT BEDTIME      dutaseride (AVODART) 0.5 mg capsule Take 0.5 mg by mouth daily.  acetaminophen-codeine (TYLENOL-CODEINE #3) 300-30 mg per tablet Take 2 Tabs by mouth every four (4) hours as needed for Pain. Max Daily Amount: 12 Tabs. 30 Tab 0    loratadine (CLARITIN) 10 mg tablet Take 10 mg by mouth daily as needed for Allergies. No current facility-administered medications on file prior to visit. Allergies   Allergen Reactions    Ace Inhibitors Hives    Lisinopril Hives     HIVES, ORAL SWELLING     Patient Care Team:  Jonathan Palumbo NP as PCP - GERONTOLOGY (Nurse Practitioner)  Lynnae Claude, MD as Physician (Cardiology)  Stefan Churchill MD as Consulting Provider (Jhonatan Faye)  Kimberly Childs MD as Physician (Urology)  Danni Kaplan MD as Physician (Orthopedic Surgery)  Objective:     Visit Vitals    /78 (BP 1 Location: Left arm, BP Patient Position: Sitting)    Pulse 85    Temp 97.7 °F (36.5 °C)    Resp 20    Ht 5' 6.93\" (1.7 m)    Wt 146 lb (66.2 kg)    SpO2 97%    BMI 22.91 kg/m2    Body mass index is 22.91 kg/(m^2). No physical exam was performed today per Medicare Wellness Guidelines. Health Maintenance:   Daily Aspirin: yes  Bone Density: No - Per Patient he has not had any Dexa scans completed.    Glaucoma Screening: UTD  Immunizations:   Immunization History   Administered Date(s) Administered    Influenza High Dose Vaccine PF 04/01/2017    TB Skin Test (PPD) 05/01/2018    Tdap 01/01/2006    Zoster Vaccine, Live 01/01/2006     Functional Assessment:   Depression Screen:   PHQ over the last two weeks 6/11/2018   Little interest or pleasure in doing things Not at all   Feeling down, depressed or hopeless Not at all   Total Score PHQ 2 0   Trouble falling or staying asleep, or sleeping too much Not at all   Feeling tired or having little energy Not at all   Poor appetite or overeating Not at all   Feeling bad about yourself - or that you are a failure or have let yourself or your family down Not at all   Moving or speaking so slowly that other people could have noticed; or the opposite being so fidgety that others notice Not at all   Thoughts of being better off dead, or hurting yourself in some way Not at all   How difficult have these problems made it for you to do your work, take care of your home and get along with others Not difficult at all      [address] Exam 6/11/2018   What is the Year 1   What is the Season 1   What is the Date 1   What is the Day 1   What is the Month 1   Where are we State 1   Where are we Country 1   Where are we [place] 1   [place] are we Floor 1   Name three objects, then ask the patient to say them 3   Serial sevens Subtract 7 from 100 in increments 3   Ask for the three objects repeated above 3   Name a pencil 1   Name a watch 1   Have the patient repeat this phrase \"No ifs, ands, or buts\" 1   Three stage command: Take the paper in your right hand 1   Fold the paper in half 1   Put the paper on the floor 1   Read and obey the following: CLOSE YOUR EYES 0   Have the patient write a sentence 1   Have the patient copy a figure 1   Mini Mental Score 27     Fall Risk:   Fall Risk Assessment, last 12 mths 6/11/2018   Able to walk? Yes   Fall in past 12 months? No      Abuse Screen:   Abuse Screening Questionnaire 6/11/2018   Do you ever feel afraid of your partner? N   Are you in a relationship with someone who physically or mentally threatens you? N   Is it safe for you to go home? Y      Hearing Status: impaired; The patient needs further evaluation. declines further workup related to hearing   Activities of Daily Living:Resides in [place] [address] at [facility]. hedoes not require help with any ADLs   Adult Nutrition Screen: diet high in sugar, fat, cholesterol  What are the patient's living arrangements - the patient lives with their spouse. Does the patient use any ambulatory aids: yes   If so, what type: cane   Does the patient exhibit a steady gait?  no    Is the patient self reliant?  (ie can do own laundry, meals, household chores)  yes    Does the patient handle his/her own medications?   yes   Does the patient handle his/her own money? yes   Is the patients home safe (ie good lighting, handrails on stairs and bath, etc.)? yes   Did you notice or did patient express any hearing difficulties? yes   Did you notice or did patient express any vision difficulties? yes   Were distance and reading eye charts used? yes     Advance Care Planning:    Date of ACP Conversation: 06/11/18  Persons included in Conversation:  patient and family  Length of ACP Conversation in minutes:  16 minutes  Is the patient competent to make his/her health decisions: yes  Authorized Decision Maker (if patient is incapable of making informed decisions):Healthcare Agent/Medical Power of  under Advance Directive  For Patients with Decision Making Capacity: \"In these circumstances, what matters most to you? \"  Care focused more on comfort or quality of life. Conversation Outcomes / Follow-Up Plan: Recommended completion of Advance Directive form after review of ACP materials and conversation with prospective healthcare agent   Referral made for ACP follow-up assistance to:  nurse     Discussed at great length with patient, wife and daughter. He states he has documentation and will bring it back. I have advised them all that until I have documentation to support his wishes I hgave to make him a Full Code for our records. Educational materials were given during the meeting today as to the difference in DNR, Advanced Directives, and [address]. Referrals:    Was further evaluation necessary?no    Further medical records are needed. Request will be made. Treatment Plan: The following medication/treatments have been discontinued by the provider today:   Medications Discontinued During This Encounter   Medication Reason    phenazopyridine (PYRIDIUM) 100 mg tablet Therapy Completed       1. Urinary frequency    2. Acute cystitis with hematuria    3. Glucose found in urine on examination    4.  Abnormal finding of blood chemistry 5. Encounter to discuss test results    6. Encounter for family conference with patient present    9. Medicare annual wellness visit, subsequent    8. ACP (advance care planning)    9. Full code status      Disclaimer: This is an [facility] Exam (AWV). Patient verbalized understanding and agreement with the plan. A copy of the After Visit Summary with personalized health plan was given to the patient today. Greater than 30 minutes was spent with patient discussing advance directives, wellness initiatives, and preventative measures of their health. I have reviewed the patient's medical history in detail and updated the computerized patient record.      Donis Peters NP

## 2018-06-11 NOTE — PATIENT INSTRUCTIONS
Advance Directives: Care Instructions  Your Care Instructions  An advance directive is a legal way to state your wishes at the end of your life. It tells your family and your doctor what to do if you can no longer say what you want. There are two main types of advance directives. You can change them any time that your wishes change. · A living will tells your family and your doctor your wishes about life support and other treatment. · A durable power of  for health care lets you name a person to make treatment decisions for you when you can't speak for yourself. This person is called a health care agent. If you do not have an advance directive, decisions about your medical care may be made by a doctor or a  who doesn't know you. It may help to think of an advance directive as a gift to the people who care for you. If you have one, they won't have to make tough decisions by themselves. Follow-up care is a key part of your treatment and safety. Be sure to make and go to all appointments, and call your doctor if you are having problems. It's also a good idea to know your test results and keep a list of the medicines you take. How can you care for yourself at home? · Discuss your wishes with your loved ones and your doctor. This way, there are no surprises. · Many states have a unique form. Or you might use a universal form that has been approved by many states. This kind of form can sometimes be completed and stored online. Your electronic copy will then be available wherever you have a connection to the Internet. In most cases, doctors will respect your wishes even if you have a form from a different state. · You don't need a  to do an advance directive. But you may want to get legal advice. · Think about these questions when you prepare an advance directive:  ¨ Who do you want to make decisions about your medical care if you are not able to?  Many people choose a family member or close friend. ¨ Do you know enough about life support methods that might be used? If not, talk to your doctor so you understand. ¨ What are you most afraid of that might happen? You might be afraid of having pain, losing your independence, or being kept alive by machines. ¨ Where would you prefer to die? Choices include your home, a hospital, or a nursing home. ¨ Would you like to have information about hospice care to support you and your family? ¨ Do you want to donate organs when you die? ¨ Do you want certain Faith practices performed before you die? If so, put your wishes in the advance directive. · Read your advance directive every year, and make changes as needed. When should you call for help? Be sure to contact your doctor if you have any questions. Where can you learn more? Go to http://thomas-jason.info/. Enter R264 in the search box to learn more about \"Advance Directives: Care Instructions. \"  Current as of: September 24, 2016  Content Version: 11.4  © 2930-6817 [employer]. Care instructions adapted under license by [employer] (which disclaims liability or warranty for this information). If you have questions about a medical condition or this instruction, always ask your healthcare professional. [place] [address] any warranty or liability for your use of this information. Frequent Urination: Care Instructions  Your Care Instructions  An urge to urinate frequently but usually passing only small amounts of urine is a common symptom of urinary problems, such as urinary tract infections. The bladder may become inflamed. This can cause the urge to urinate. You may try to urinate more often than usual to try to soothe that urge. Frequent urination also may be caused by sexually transmitted infections (STIs) or kidney stones.  Or it may happen when something irritates the tube that carries urine from the bladder to the outside of the body (urethra). It may also be a sign of diabetes. The cause may be hard to find. You may need tests. Follow-up care is a key part of your treatment and safety. Be sure to make and go to all appointments, and call your doctor if you are having problems. It's also a good idea to know your test results and keep a list of the medicines you take. How can you care for yourself at home? · Drink extra water for the next day or two. This will help make the urine less concentrated. (If you have kidney, heart, or liver disease and have to limit fluids, talk with your doctor before you increase the amount of fluids you drink.)  · Avoid drinks that are carbonated or have caffeine. They can irritate the bladder. For women:  · Urinate right after you have sex. · After you go to the bathroom, wipe from front to back. · Avoid douches, bubble baths, and feminine hygiene sprays. And avoid other feminine hygiene products that have deodorants. When should you call for help? Call your doctor now or seek immediate medical care if:  ? · You have new symptoms, such as fever, nausea, or vomiting. ? · You have new or worse symptoms of a urinary problem. For example:  ¨ You have blood or pus in your urine. ¨ You have chills or body aches. ¨ It hurts to urinate. ¨ You have groin or belly pain. ¨ You have pain in your back just below your rib cage (the flank area). ? Watch closely for changes in your health, and be sure to contact your doctor if you feel thirstier than usual.  Where can you learn more? Go to http://thomas-jason.info/. Enter 077 [id number] in the search box to learn more about \"Frequent Urination: Care Instructions. \"  Current as of: May 12, 2017  Content Version: 11.4  © 2180-2499 [employer]. Care instructions adapted under license by [employer] (which disclaims liability or warranty for this information).  If you have questions about a medical condition or this instruction, always ask your healthcare professional. [place] [address] any warranty or liability for your use of this information.

## 2018-06-11 NOTE — PROGRESS NOTES
Reason for Visit:      Chief Complaint   Patient presents with    Urgency     Patient being seen for urinary frequency and burning.  Establish Care     Patient and his wife moved to [facility] 2 weeks ago from [facility] and need to establish care with a provider. History of Present Illness:   Jonelle Dubose is a 80 y.o. male geriatric patient who presents today with concerns to establish care as he and his wife have just moved into [facility] about 2 weeks ago from [facility]. He is also having increased burning, urgency, pain, and frequency when urinating started about Tuesday this week he explains. On first assessment there is some memory loss that seems to be playing apart of concern with the visit today. He has lots of urgency and pain in the lower abdomen. He is here with his wife. He is urinating about every hour and sometimes more frequent. The history is slightly obscured by some memory or recall concerns. Medical history is positive for bladder cancer in the past, 2 TURPs by dr. Zee Lind at [facility] in the past. He sees Dr. Paul Herrera for cardiology and has previously had a coronary bypass in 1999. Multiple other various surgery regarding orthopedics and joint care. I attempted to obtain history from Mr. Craft and his wife. They have 4 daughters. 1 passed away from breast CA a few years ago. The other 3 are very involved and like to be apart of their medical health. Mr. Michelle Guevara will need to establish care with providers here in [place] as all of his doctors are in [facility]. He goes to the gym about 4 x per week and dose a lot of biking and tennis. He has a history of lumbar stenosis as well as multi level joint degeneration. He manages his medications on his own in a pill box and denies any issues with memory loss as he was a practicing  for 50 years in [facility].      Today's urine shows a large amount of glucose in his urine but he nor his wife remember anyone stating he had diabetes and he is currently not on any treatment for diabetes. He denies any kidney stones, visual blood in the urine, or penile discharge. Diagnosis/Treatment Plan: The following orders have been placed for treatment of the diagnoses above:    ICD-10-CM ICD-9-CM    1. Urinary frequency R35.0 788.41 AMB POC URINALYSIS DIP STICK MANUAL W/O MICRO      UA/M W/RFLX CULTURE, ROUTINE      CBC WITH AUTOMATED DIFF      METABOLIC PANEL, COMPREHENSIVE      HEMOGLOBIN A1C WITH EAG      AMMONIA      LIPID PANEL      COLLECTION VENOUS BLOOD,VENIPUNCTURE      LACTIC ACID      TSH REFLEX TO T4      phenazopyridine (PYRIDIUM) 100 mg tablet      nitrofurantoin, macrocrystal-monohydrate, (MACROBID) 100 mg capsule   2. Encounter to establish care with new doctor Z76.89 V65.8 CBC WITH AUTOMATED DIFF      METABOLIC PANEL, COMPREHENSIVE      HEMOGLOBIN A1C WITH EAG      AMMONIA      LIPID PANEL      COLLECTION VENOUS BLOOD,VENIPUNCTURE      LACTIC ACID      TSH REFLEX TO T4      phenazopyridine (PYRIDIUM) 100 mg tablet   3. Bladder spasms N32.89 596.89 CBC WITH AUTOMATED DIFF      METABOLIC PANEL, COMPREHENSIVE      HEMOGLOBIN A1C WITH EAG      AMMONIA      LIPID PANEL      COLLECTION VENOUS BLOOD,VENIPUNCTURE      LACTIC ACID      TSH REFLEX TO T4      phenazopyridine (PYRIDIUM) 100 mg tablet      nitrofurantoin, macrocrystal-monohydrate, (MACROBID) 100 mg capsule   4. Acute cystitis with hematuria N30.01 595.0 CBC WITH AUTOMATED DIFF      METABOLIC PANEL, COMPREHENSIVE      HEMOGLOBIN A1C WITH EAG      AMMONIA      LIPID PANEL      COLLECTION VENOUS BLOOD,VENIPUNCTURE      LACTIC ACID      TSH REFLEX TO T4      phenazopyridine (PYRIDIUM) 100 mg tablet      nitrofurantoin, macrocrystal-monohydrate, (MACROBID) 100 mg capsule   5.  Glucose found in urine on examination R81 791.5 CBC WITH AUTOMATED DIFF      METABOLIC PANEL, COMPREHENSIVE      HEMOGLOBIN A1C WITH EAG      AMMONIA      LIPID PANEL      COLLECTION VENOUS BLOOD,VENIPUNCTURE      LACTIC ACID      TSH REFLEX TO T4      phenazopyridine (PYRIDIUM) 100 mg tablet      nitrofurantoin, macrocrystal-monohydrate, (MACROBID) 100 mg capsule   6. Abnormal finding of blood chemistry  R79.9 790.6 HEMOGLOBIN A1C WITH EAG      LIPID PANEL      phenazopyridine (PYRIDIUM) 100 mg tablet   7. Abnormal findings on diagnostic imaging of other specified body structures  R93.8 793.99 TSH REFLEX TO T4      phenazopyridine (PYRIDIUM) 100 mg tablet        The following medication/treatments have been discontinued by the provider today:   There are no discontinued medications. Follow Up: Follow-up Disposition:  Return in about 3 days (around 6/11/2018) for with the NP, regarding your treatment/orders today. Subjective: Allergies   Allergen Reactions    Ace Inhibitors Hives    Lisinopril Hives     HIVES, ORAL SWELLING     Prior to Admission medications    Medication Sig Start Date End Date Taking? Authorizing Provider   phenazopyridine (PYRIDIUM) 100 mg tablet Take 1 Tab by mouth three (3) times daily (after meals) for 3 days. Indications: URINARY TRACT IRRITATION 6/8/18 6/11/18 Yes Jasmyn Pascal NP   nitrofurantoin, macrocrystal-monohydrate, (MACROBID) 100 mg capsule Take 1 Cap by mouth two (2) times a day for 7 days. Indications: BACTERIAL URINARY TRACT INFECTION 6/8/18 6/15/18 Yes Jasmyn Pascal NP   carvedilol (COREG) 3.125 mg tablet Take  by mouth two (2) times daily (with meals). Yes Historical Provider   atorvastatin (LIPITOR) 40 mg tablet Take 40 mg by mouth every evening. Yes Historical Provider   losartan (COZAAR) 100 mg tablet Take 100 mg by mouth daily. Yes Historical Provider   aspirin delayed-release 81 mg tablet Take 81 mg by mouth daily. Yes Historical Provider   doxazosin (CARDURA) 8 mg tablet Take 4 mg by mouth daily. AT BEDTIME   Yes Historical Provider   dutaseride (AVODART) 0.5 mg capsule Take 0.5 mg by mouth daily.    Yes Historical Provider   acetaminophen-codeine (TYLENOL-CODEINE #3) 300-30 mg per tablet Take 2 Tabs by mouth every four (4) hours as needed for Pain. Max Daily Amount: 12 Tabs. 12/7/15   Manuel Rendon III, MD   loratadine (CLARITIN) 10 mg tablet Take 10 mg by mouth daily as needed for Allergies. Historical Provider     Past Medical History:   Diagnosis Date    Arthritis     CAD (coronary artery disease)     HX CABG    Cancer ([facility] [address]     BLADDER    Chronic pain     Essential hypertension 6/8/2018    GERD (gastroesophageal reflux disease)     Hypercholesterolemia     Osteoarthritis of left knee 2/1/2011    Other ill-defined conditions(799.89)     BPH      Past Surgical History:   Procedure Laterality Date    CARDIAC SURG PROCEDURE UNLIST  1999    CABG    HX COLONOSCOPY      HX GI      ESOPH DIL.  HX HEENT      TONSILLECTOMY    HX ORTHOPAEDIC  2008    R KNEE REPLACE    HX ORTHOPAEDIC  2004    ARTHROSCOPY R KNEE    HX ORTHOPAEDIC  2002    ARTHROSCOPY L KNEE    HX OTHER SURGICAL      ENDOSCOPY-X2    HX PROSTATECTOMY      HX TURP  2014, 2015    HX UROLOGICAL  11/02/2015    Transurethral resection of bladder tumor    TOTAL KNEE ARTHROPLASTY  2011    LEFT      Social History   Substance Use Topics    Smoking status: Never Smoker    Smokeless tobacco: Never Used    Alcohol use 2.0 oz/week     4 Glasses of wine per week      Comment: 4-5 OZ WINE DAILY      Family History   Problem Relation Age of Onset    Heart Disease Father     Cancer Mother     Anesth Problems Neg Hx       Latest Laboratory Results:     None on file, requested from previous provider.    Objective:   CODE STATUS: Full- At this time we do not have a copy of any advanced directives, living medina, or DNR  Vitals:    06/08/18 1219   BP: 112/62   Pulse: 75   Resp: 20   Temp: 98.1 °F (36.7 °C)   TempSrc: Oral   SpO2: 97%   Weight: 146 lb (66.2 kg)   Height: 5' 6.93\" (1.7 m)     Wt Readings from Last 3 Encounters:   06/11/18 146 lb (66.2 kg)   06/08/18 146 lb (66.2 kg)   06/08/18 146 lb (66.2 kg) BP Readings from Last 3 Encounters:   06/11/18 130/78   06/08/18 112/62   06/08/18 112/62     Review of Systems   Constitutional: Positive for activity change and fatigue. Negative for appetite change and fever. HENT: Negative for congestion, dental problem, hearing loss, postnasal drip, sinus pressure, sneezing, sore throat and trouble swallowing. Eyes: Negative for discharge, redness and visual disturbance. Respiratory: Negative for apnea, cough, chest tightness, shortness of breath and wheezing. Cardiovascular: Negative for chest pain, palpitations and leg swelling. Gastrointestinal: Negative for abdominal distention, abdominal pain, blood in stool, constipation, diarrhea, nausea and vomiting. Endocrine: Positive for polydipsia and polyuria. Genitourinary: Positive for difficulty urinating, dysuria, frequency and urgency. Negative for flank pain. Musculoskeletal: Positive for arthralgias, back pain, gait problem and myalgias. Negative for joint swelling and neck pain. Skin: Negative for color change, pallor, rash and wound. Allergic/Immunologic: Negative for environmental allergies and food allergies. Neurological: Positive for weakness. Negative for dizziness, tremors, light-headedness, numbness and headaches. Hematological: Negative for adenopathy. Psychiatric/Behavioral: Negative for agitation, confusion and sleep disturbance. The patient is not nervous/anxious. Physical Exam   Constitutional: He is oriented to person, place, and time. Vital signs are normal. He appears malnourished and dehydrated. He appears to not be writhing in pain. He appears healthy. He appears toxic. He does not have a sickly appearance. No distress. Thin, frail, fragile in appearing, elderly,  male. Presents with his wife. Mild flight of ideas and thoughts. HENT:   Head: Normocephalic and atraumatic.    Right Ear: Tympanic membrane, external ear and ear canal normal. Decreased hearing is noted. Left Ear: Tympanic membrane, external ear and ear canal normal. Decreased hearing is noted. Nose: Nose normal.   Mouth/Throat: Uvula is midline, oropharynx is clear and moist and mucous membranes are normal. Mucous membranes are not pale, not dry and not cyanotic. No oral lesions. No uvula swelling. No posterior oropharyngeal edema or posterior oropharyngeal erythema. Neck: Trachea normal. No JVD present. Spinous process tenderness and muscular tenderness present. Rigidity present. Decreased range of motion present. No thyromegaly present. Cardiovascular: S1 normal, S2 normal, intact distal pulses and normal pulses. A regularly irregular rhythm present. Bradycardia present. Exam reveals distant heart sounds. Exam reveals no gallop and no friction rub. No murmur heard. No extremity edema is present during today's exam.    Pulmonary/Chest: Effort normal and breath sounds normal.   Abdominal: Soft. Normal appearance and bowel sounds are normal. There is no hepatosplenomegaly. There is no tenderness. There is no CVA tenderness. Musculoskeletal:   Generalized chronic extremity weakness is present. Lymphadenopathy:        Head (right side): No submandibular, no tonsillar and no posterior auricular adenopathy present. Head (left side): No submandibular, no tonsillar and no posterior auricular adenopathy present. He has no cervical adenopathy. Neurological: He is alert and oriented to person, place, and time. He has normal reflexes and intact cranial nerves. He displays weakness, atrophy and abnormal stance. A sensory deficit is present. He exhibits abnormal muscle tone. Coordination and gait abnormal. GCS score is 15. Skin: Skin is warm, dry and intact. No bruising and no rash noted. He is not diaphoretic. No cyanosis. No pallor. Nails show no clubbing.    Psychiatric: Mood, memory, affect and judgment normal.   Baseline mood and affect unchanged from normal.         Disclaimer: Advised Daniel Craft to call back or return to office if symptoms worsen/change/persist. Discussed expected course/resolution/complications of diagnosis in detail with patient. Medication risks/benefits/costs/interactions/alternatives discussed with patient and he was given an after visit summary which includes diagnoses, current medications, & vitals. Daniel Craft expressed understanding with the diagnosis and plan.

## 2018-06-13 LAB
APPEARANCE UR: ABNORMAL
BACTERIA #/AREA URNS HPF: ABNORMAL /[HPF]
BACTERIA UR CULT: ABNORMAL
BILIRUB UR QL STRIP: NEGATIVE
CASTS URNS QL MICRO: ABNORMAL /LPF
COLOR UR: YELLOW
EPI CELLS #/AREA URNS HPF: ABNORMAL /HPF
GLUCOSE UR QL: ABNORMAL
HGB UR QL STRIP: ABNORMAL
KETONES UR QL STRIP: NEGATIVE
LEUKOCYTE ESTERASE UR QL STRIP: ABNORMAL
MICRO URNS: ABNORMAL
NITRITE UR QL STRIP: NEGATIVE
PH UR STRIP: 5.5 [PH] (ref 5–7.5)
PROT UR QL STRIP: ABNORMAL
RBC #/AREA URNS HPF: ABNORMAL /HPF
SP GR UR: 1.02 (ref 1–1.03)
URINALYSIS REFLEX, 377202: ABNORMAL
UROBILINOGEN UR STRIP-MCNC: 0.2 MG/DL (ref 0.2–1)
WBC #/AREA URNS HPF: >30 /HPF

## 2018-06-15 DIAGNOSIS — N30.01 ACUTE CYSTITIS WITH HEMATURIA: Primary | ICD-10-CM

## 2018-06-15 DIAGNOSIS — Z51.81 MEDICATION CHANGED TO THERAPEUTIC EQUIVALENT ON FORMULARY: ICD-10-CM

## 2018-06-15 RX ORDER — SULFAMETHOXAZOLE AND TRIMETHOPRIM 800; 160 MG/1; MG/1
1 TABLET ORAL 2 TIMES DAILY
Qty: 20 TAB | Refills: 0 | Status: SHIPPED | OUTPATIENT
Start: 2018-06-15 | End: 2018-06-25

## 2018-06-15 NOTE — PROGRESS NOTES
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 Staphylococcus lugdunensis, a common cause of skin and soft tissue infections in the community. Staphylococcus lugdunensis, a rare cause of severe infections such as native valve endocarditis, often causes superficial skin infections similar to Staphylococcus aureus infections. I have [facility] to see if this could be related to his latest cystoscopy he had about 6 weeks ago? I will order 10 days of bactrim as the guidelines do not recommend use of Macrobid to treat this bug.

## 2018-06-15 NOTE — PROGRESS NOTES
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 Staphylococcus lugdunensis, a common cause of skin and soft tissue infections in the community. Staphylococcus lugdunensis, a rare cause of severe infections such as native valve endocarditis, often causes superficial skin infections similar to Staphylococcus aureus infections. I have left a message with Dr. Anisha Fletcher office to see if this could be related to his latest cystoscopy he had about 6 weeks ago? I will order 10 days of bactrim as the guidelines do not recommend use of Macrobid to treat this bug. Patient will need a blood draw on Tuesday this week Smita 19th to check his potassium to make sure the antibiotics are not causing any changes with his K. Then a urine culture after he takes the last Bactrim.      Gayle Dunham

## 2018-06-20 ENCOUNTER — OFFICE VISIT (OUTPATIENT)
Dept: GERIATRIC MEDICINE | Age: 83
End: 2018-06-20

## 2018-06-20 VITALS
SYSTOLIC BLOOD PRESSURE: 104 MMHG | DIASTOLIC BLOOD PRESSURE: 56 MMHG | BODY MASS INDEX: 23.5 KG/M2 | TEMPERATURE: 98.6 F | OXYGEN SATURATION: 98 % | WEIGHT: 146.2 LBS | HEART RATE: 79 BPM | HEIGHT: 66 IN | RESPIRATION RATE: 18 BRPM

## 2018-06-20 DIAGNOSIS — B37.0 CANDIDA, ORAL: ICD-10-CM

## 2018-06-20 DIAGNOSIS — I10 HYPERTENSION, UNSPECIFIED TYPE: ICD-10-CM

## 2018-06-20 DIAGNOSIS — M25.571 CHRONIC PAIN OF RIGHT ANKLE: ICD-10-CM

## 2018-06-20 DIAGNOSIS — R60.0 BILATERAL LEG EDEMA: Primary | ICD-10-CM

## 2018-06-20 DIAGNOSIS — N30.00 ACUTE CYSTITIS WITHOUT HEMATURIA: ICD-10-CM

## 2018-06-20 DIAGNOSIS — G89.29 CHRONIC PAIN OF RIGHT ANKLE: ICD-10-CM

## 2018-06-20 RX ORDER — DARIFENACIN HYDROBROMIDE 15 MG/1
15 TABLET, EXTENDED RELEASE ORAL DAILY
COMMUNITY
End: 2018-08-13

## 2018-06-20 RX ORDER — DICLOFENAC SODIUM 10 MG/G
2 GEL TOPICAL 4 TIMES DAILY
Qty: 1 EACH | Refills: 1 | Status: SHIPPED | OUTPATIENT
Start: 2018-06-20 | End: 2018-08-13

## 2018-06-20 RX ORDER — SIMVASTATIN 80 MG/1
80 TABLET, FILM COATED ORAL DAILY
COMMUNITY
End: 2018-08-13

## 2018-06-20 RX ORDER — OMEPRAZOLE 20 MG/1
20 CAPSULE, DELAYED RELEASE ORAL EVERY OTHER DAY
COMMUNITY
End: 2018-08-13

## 2018-06-20 RX ORDER — NYSTATIN 100000 [USP'U]/ML
5 SUSPENSION ORAL 4 TIMES DAILY
Qty: 280 ML | Refills: 0 | Status: SHIPPED | OUTPATIENT
Start: 2018-06-20 | End: 2018-07-04

## 2018-06-20 NOTE — PROGRESS NOTES
HISTORY OF PRESENT ILLNESS  Gabe Story is a 80 y.o. male. Ankle swelling    The history is provided by the patient. This is a recurrent problem. The current episode started more than 1 week ago. The problem occurs daily. The problem has been gradually improving. The pain is present in the right ankle. The quality of the pain is described as intermittent (history of right ankle injury reports pain is chronic and aleve helps ). The pain is at a severity of 2/10. The pain is mild. Associated symptoms include stiffness. Pertinent negatives include no numbness, full range of motion, no tingling, no itching, no back pain and no neck pain. The symptoms are aggravated by activity and standing. He has tried OTC pain medications for the symptoms. There has been a history of trauma (reports trauma from sports). Patient denies       Review of Systems   Constitutional: Negative for chills, diaphoresis, fever, malaise/fatigue and weight loss. HENT: Negative for congestion, ear discharge, ear pain, hearing loss, nosebleeds, sinus pain, sore throat and tinnitus. Eyes: Negative for blurred vision, double vision, photophobia, pain, discharge and redness. Respiratory: Negative for cough, hemoptysis, sputum production, shortness of breath, wheezing and stridor. Cardiovascular: Positive for leg swelling. Negative for chest pain, palpitations, orthopnea, claudication and PND. Non pitting edema to bilateral lower extremities   Gastrointestinal: Negative for abdominal pain, blood in stool, constipation, diarrhea, heartburn, melena, nausea and vomiting. Genitourinary: Positive for frequency. Negative for dysuria, flank pain, hematuria and urgency. On treatment for UTI Bactrim stop date 6/25/2018   Musculoskeletal: Positive for joint pain and stiffness. Negative for back pain, falls, myalgias and neck pain. Skin: Negative for itching and rash.    Neurological: Negative for dizziness, tingling, tremors, sensory change, speech change, focal weakness, seizures, loss of consciousness, weakness, numbness and headaches. Endo/Heme/Allergies: Negative for environmental allergies and polydipsia. Does not bruise/bleed easily. Psychiatric/Behavioral: Negative for depression, hallucinations, memory loss, substance abuse and suicidal ideas. The patient is not nervous/anxious and does not have insomnia. Physical Exam   Constitutional: He is oriented to person, place, and time. He appears well-developed and well-nourished. No distress. HENT:   Head: Normocephalic and atraumatic. Right Ear: External ear normal.   Left Ear: External ear normal.   Nose: Nose normal.   Mouth/Throat: No oropharyngeal exudate. Oral candidasis   Eyes: Conjunctivae and EOM are normal. Pupils are equal, round, and reactive to light. Right eye exhibits no discharge. Left eye exhibits no discharge. Neck: Normal range of motion. Neck supple. No JVD present. No tracheal deviation present. No thyromegaly present. Cardiovascular: Normal rate, regular rhythm, normal heart sounds and intact distal pulses. Exam reveals no gallop and no friction rub. No murmur heard. Pulmonary/Chest: Effort normal and breath sounds normal. No stridor. No respiratory distress. He has no wheezes. He has no rales. He exhibits no tenderness. Abdominal: Soft. Bowel sounds are normal. He exhibits no distension and no mass. There is no tenderness. There is no rebound and no guarding. Genitourinary:   Genitourinary Comments: Not assessed   Musculoskeletal: Normal range of motion. He exhibits edema. He exhibits no tenderness or deformity. Non pitting edema to bilateral lower extremities   Lymphadenopathy:     He has no cervical adenopathy. Neurological: He is alert and oriented to person, place, and time. He has normal reflexes. He displays normal reflexes. No cranial nerve deficit. He exhibits normal muscle tone. Coordination normal.   Skin: Skin is warm and dry. No rash noted. He is not diaphoretic. No erythema. No pallor. Psychiatric: He has a normal mood and affect. His behavior is normal. Judgment and thought content normal.   Vitals reviewed. ASSESSMENT and PLAN    ICD-10-CM ICD-9-CM    1. Bilateral leg edema R60.0 782.3    2. Chronic pain of right ankle M25.571 719.47     G89.29 338.29    3. Veronica, oral B37.0 112.0    4. Acute cystitis without hematuria N30.00 595.0    5.  Hypertension, unspecified type I10 401.9      Plan   BLE- will order AKUA hose on during the day off at night  Right Ankle Pain-Diclofenac gel 1% 2 grams 4 times a day   Candida oral-nystatin oral 100,000 units take 5 ml swish and spit QID x 14 days   UTI-continue Bactrim DS as ordered  HTN-continue current treatment plan BP stable

## 2018-06-20 NOTE — PATIENT INSTRUCTIONS
High Blood Pressure: Care Instructions  Your Care Instructions    If your blood pressure is usually above 140/90, you have high blood pressure, or hypertension. That means the top number is 140 or higher or the bottom number is 90 or higher, or both. Despite what a lot of people think, high blood pressure usually doesn't cause headaches or make you feel dizzy or lightheaded. It usually has no symptoms. But it does increase your risk for heart attack, stroke, and kidney or eye damage. The higher your blood pressure, the more your risk increases. Your doctor will give you a goal for your blood pressure. Your goal will be based on your health and your age. An example of a goal is to keep your blood pressure below 140/90. Lifestyle changes, such as eating healthy and being active, are always important to help lower blood pressure. You might also take medicine to reach your blood pressure goal.  Follow-up care is a key part of your treatment and safety. Be sure to make and go to all appointments, and call your doctor if you are having problems. It's also a good idea to know your test results and keep a list of the medicines you take. How can you care for yourself at home? Medical treatment  · If you stop taking your medicine, your blood pressure will go back up. You may take one or more types of medicine to lower your blood pressure. Be safe with medicines. Take your medicine exactly as prescribed. Call your doctor if you think you are having a problem with your medicine. · Talk to your doctor before you start taking aspirin every day. Aspirin can help certain people lower their risk of a heart attack or stroke. But taking aspirin isn't right for everyone, because it can cause serious bleeding. · See your doctor regularly. You may need to see the doctor more often at first or until your blood pressure comes down.   · If you are taking blood pressure medicine, talk to your doctor before you take decongestants or anti-inflammatory medicine, such as ibuprofen. Some of these medicines can raise blood pressure. · Learn how to check your blood pressure at home. Lifestyle changes  · Stay at a healthy weight. This is especially important if you put on weight around the waist. Losing even 10 pounds can help you lower your blood pressure. · If your doctor recommends it, get more exercise. Walking is a good choice. Bit by bit, increase the amount you walk every day. Try for at least 30 minutes on most days of the week. You also may want to swim, bike, or do other activities. · Avoid or limit alcohol. Talk to your doctor about whether you can drink any alcohol. · Try to limit how much sodium you eat to less than 2,300 milligrams (mg) a day. Your doctor may ask you to try to eat less than 1,500 mg a day. · Eat plenty of fruits (such as bananas and oranges), vegetables, legumes, whole grains, and low-fat dairy products. · Lower the amount of saturated fat in your diet. Saturated fat is found in animal products such as milk, cheese, and meat. Limiting these foods may help you lose weight and also lower your risk for heart disease. · Do not smoke. Smoking increases your risk for heart attack and stroke. If you need help quitting, talk to your doctor about stop-smoking programs and medicines. These can increase your chances of quitting for good. When should you call for help? Call 911 anytime you think you may need emergency care. This may mean having symptoms that suggest that your blood pressure is causing a serious heart or blood vessel problem. Your blood pressure may be over 180/110. ? For example, call 911 if:  ? · You have symptoms of a heart attack. These may include:  ¨ Chest pain or pressure, or a strange feeling in the chest.  ¨ Sweating. ¨ Shortness of breath. ¨ Nausea or vomiting.   ¨ Pain, pressure, or a strange feeling in the back, neck, jaw, or upper belly or in one or both shoulders or arms.  ¨ Lightheadedness or sudden weakness. ¨ A fast or irregular heartbeat. ? · You have symptoms of a stroke. These may include:  ¨ Sudden numbness, tingling, weakness, or loss of movement in your face, arm, or leg, especially on only one side of your body. ¨ Sudden vision changes. ¨ Sudden trouble speaking. ¨ Sudden confusion or trouble understanding simple statements. ¨ Sudden problems with walking or balance. ¨ A sudden, severe headache that is different from past headaches. ? · You have severe back or belly pain. ?Do not wait until your blood pressure comes down on its own. Get help right away. ?Call your doctor now or seek immediate care if:  ? · Your blood pressure is much higher than normal (such as 180/110 or higher), but you don't have symptoms. ? · You think high blood pressure is causing symptoms, such as:  ¨ Severe headache. ¨ Blurry vision. ? Watch closely for changes in your health, and be sure to contact your doctor if:  ? · Your blood pressure measures 140/90 or higher at least 2 times. That means the top number is 140 or higher or the bottom number is 90 or higher, or both. ? · You think you may be having side effects from your blood pressure medicine. ? · Your blood pressure is usually normal, but it goes above normal at least 2 times. Where can you learn more? Go to http://thomas-jason.info/. Enter S211 in the search box to learn more about \"High Blood Pressure: Care Instructions. \"  Current as of: September 21, 2016  Content Version: 11.4  © 6390-6711 [employer]. Care instructions adapted under license by [employer] (which disclaims liability or warranty for this information). If you have questions about a medical condition or this instruction, always ask your healthcare professional. [address] any warranty or liability for your use of this information.        Candidiasis: Care Instructions  Your Care Instructions  Candidiasis (say \"jpp-jna-GN-uh-vidhi\") is a yeast infection. Yeast normally lives in your body. But it can cause problems if your body's defenses don't work as they should. Some medicines can increase your chance of getting a yeast infection. These include antibiotics, steroids, and cancer drugs. And some diseases like AIDS and diabetes can make you more likely to get yeast infections. There are different types of yeast infections. [place] is a yeast infection in the mouth. It usually occurs in people with weak immune systems. It causes white patches inside the mouth and throat. Yeast infections of the skin usually occur in skin folds where the skin stays moist. They cause red, oozing patches on your skin. Babies can get these infections under the diaper. People who often wear gloves can get them on their hands. Many women get vaginal yeast infections. They are most common when women take antibiotics. These infections can cause the vagina to itch and burn. They also cause white discharge that looks like cottage cheese. In rare cases, yeast infects the blood. This can cause serious disease. This kind of infection is treated with medicine given through a needle into a vein (IV). After you start treatment, a yeast infection usually goes away quickly. But if your immune system is weak, the infection may come back. Tell your doctor if you get yeast infections often. Follow-up care is a key part of your treatment and safety. Be sure to make and go to all appointments, and call your doctor if you are having problems. It's also a good idea to know your test results and keep a list of the medicines you take. How can you care for yourself at home? · Take your medicines exactly as prescribed. Call your doctor if you think you are having a problem with your medicine. · Use antibiotics only as directed by your doctor. · Eat yogurt with live cultures. It has bacteria called lactobacillus.  It may help prevent some types of yeast infections. · Keep your skin clean and dry. Put powder on moist places. · If you are using a cream or suppository to treat a vaginal yeast infection, don't use condoms or a diaphragm. Use a different type of birth control. · Eat a healthy diet and get regular exercise. This will help keep your immune system strong. When should you call for help? Watch closely for changes in your health, and be sure to contact your doctor if:  ? · You do not get better as expected. Where can you learn more? Go to http://thomas-jasno.info/. Enter L147 in the search box to learn more about \"Candidiasis: Care Instructions. \"  Current as of: October 13, 2016  Content Version: 11.4  © 0647-0569 [employer] ([employer]). Care instructions adapted under license by [employer] (which disclaims liability or warranty for this information). If you have questions about a medical condition or this instruction, always ask your healthcare professional. [place] [address] any warranty or liability for your use of this information. Leg and Ankle Edema: Care Instructions  Your Care Instructions  Swelling in the legs, ankles, and feet is called edema. It is common after you sit or stand for a while. Long plane flights or car rides often cause swelling in the legs and feet. You may also have swelling if you have to stand for long periods of time at your job. Problems with the veins in the legs (varicose veins) and changes in hormones can also cause swelling. Sometimes the swelling in the ankles and feet is caused by a more serious problem, such as heart failure, infection, blood clots, or liver or kidney disease. Follow-up care is a key part of your treatment and safety. Be sure to make and go to all appointments, and call your doctor if you are having problems. It's also a good idea to know your test results and keep a list of the medicines you take.   How can you care for yourself at home? · If your doctor gave you medicine, take it as prescribed. Call your doctor if you think you are having a problem with your medicine. · Whenever you are resting, raise your legs up. Try to keep the swollen area higher than the level of your heart. · Take breaks from standing or sitting in one position. ¨ Walk around to increase the blood flow in your lower legs. ¨ Move your feet and ankles often while you stand, or tighten and relax your leg muscles. · Wear support stockings. Put them on in the morning, before swelling gets worse. · Eat a balanced diet. Lose weight if you need to. · Limit the amount of salt (sodium) in your diet. Salt holds fluid in the body and may increase swelling. When should you call for help? Call 911 anytime you think you may need emergency care. For example, call if:  ? · You have symptoms of a blood clot in your lung (called a pulmonary embolism). These may include:  ¨ Sudden chest pain. ¨ Trouble breathing. ¨ Coughing up blood. ?Call your doctor now or seek immediate medical care if:  ? · You have signs of a blood clot, such as:  ¨ Pain in your calf, back of the knee, thigh, or groin. ¨ Redness and swelling in your leg or groin. ? · You have symptoms of infection, such as:  ¨ Increased pain, swelling, warmth, or redness. ¨ Red streaks or pus. ¨ A fever. ? Watch closely for changes in your health, and be sure to contact your doctor if:  ? · Your swelling is getting worse. ? · You have new or worsening pain in your legs. ? · You do not get better as expected. Where can you learn more? Go to http://thomas-jason.info/. Enter Q526 in the search box to learn more about \"Leg and Ankle Edema: Care Instructions. \"  Current as of: March 20, 2017  Content Version: 11.4  © 2841-1757 [employer].  Care instructions adapted under license by [employer] (which disclaims liability or warranty for this information). If you have questions about a medical condition or this instruction, always ask your healthcare professional. [address] any warranty or liability for your use of this information. Back Pain: Care Instructions  Your Care Instructions    Back pain has many possible causes. It is often related to problems with muscles and ligaments of the back. It may also be related to problems with the nerves, discs, or bones of the back. Moving, lifting, standing, sitting, or sleeping in an awkward way can strain the back. Sometimes you don't notice the injury until later. Arthritis is another common cause of back pain. Although it may hurt a lot, back pain usually improves on its own within several weeks. Most people recover in 12 weeks or less. Using good home treatment and being careful not to stress your back can help you feel better sooner. Follow-up care is a key part of your treatment and safety. Be sure to make and go to all appointments, and call your doctor if you are having problems. It's also a good idea to know your test results and keep a list of the medicines you take. How can you care for yourself at home? · Sit or lie in positions that are most comfortable and reduce your pain. Try one of these positions when you lie down:  ¨ Lie on your back with your knees bent and supported by large pillows. ¨ Lie on the floor with your legs on the seat of a sofa or chair. [place] on your side with your knees and hips bent and a pillow between your legs. ¨ Lie on your stomach if it does not make pain worse. · Do not sit up in bed, and avoid soft couches and twisted positions. Bed rest can help relieve pain at first, but it delays healing. Avoid bed rest after the first day of back pain. · Change positions every 30 minutes. If you must sit for long periods of time, take breaks from sitting. Get up and walk around, or lie in a comfortable position.   · Try using a heating pad on a low or medium setting for 15 to 20 minutes every 2 or 3 hours. Try a warm shower in place of one session with the heating pad. · You can also try an ice pack for 10 to 15 minutes every 2 to 3 hours. Put a thin cloth between the ice pack and your skin. · Take pain medicines exactly as directed. ¨ If the doctor gave you a prescription medicine for pain, take it as prescribed. ¨ If you are not taking a prescription pain medicine, ask your doctor if you can take an over-the-counter medicine. · Take short walks several times a day. You can start with 5 to 10 minutes, 3 or 4 times a day, and work up to longer walks. Walk on level surfaces and avoid hills and stairs until your back is better. · Return to work and other activities as soon as you can. Continued rest without activity is usually not good for your back. · To prevent future back pain, do exercises to stretch and strengthen your back and stomach. Learn how to use good posture, safe lifting techniques, and proper body mechanics. When should you call for help? Call your doctor now or seek immediate medical care if:  ? · You have new or worsening numbness in your legs. ? · You have new or worsening weakness in your legs. (This could make it hard to stand up.)   ? · You lose control of your bladder or bowels. ? Watch closely for changes in your health, and be sure to contact your doctor if:  ? · Your pain gets worse. ? · You are not getting better after 2 weeks. Where can you learn more? Go to http://thomas-jason.info/. Enter T475 in the search box to learn more about \"Back Pain: Care Instructions. \"  Current as of: March 21, 2017  Content Version: 11.4  © 1958-4581 [employer]. Care instructions adapted under license by [employer] (which disclaims liability or warranty for this information).  If you have questions about a medical condition or this instruction, always ask your healthcare professional. [employer] disclaims any warranty or liability for your use of this information.

## 2018-06-20 NOTE — PROGRESS NOTES
ADVISED PATIENT OF THE FOLLOWING HEALTH MAINTAINCE DUE  Health Maintenance Due   Topic Date Due    GLAUCOMA SCREENING Q2Y  01/10/1993    DTaP/Tdap/Td series (2 - Td) 01/01/2016      Chief Complaint   Patient presents with    Ankle swelling     Patient being seen for bilateral ankle edema. 1. Have you been to the ER, urgent care clinic since your last visit? Hospitalized since your last visit? NO    2. Have you seen or consulted any other health care providers outside of the [address] [place] [address] since your last visit? Include any DEXA scan, mammography  or colon screening. NO    3. Do you have an Advance Directive on file? NO     4. Do you have a DNR on file? Full Code         Patient is accompanied by self  I have received verbal consent from Antonio Darden to discuss any/all medical information while they are present in the room.

## 2018-08-06 ENCOUNTER — TELEPHONE (OUTPATIENT)
Dept: GERIATRIC MEDICINE | Age: 83
End: 2018-08-06

## 2018-08-06 NOTE — TELEPHONE ENCOUNTER
Herman Begum, please call Adi Vigil @[phone] to discuss her father's health and need for a complete physical to determine if he is doing ok.  Tnx.

## 2018-08-06 NOTE — TELEPHONE ENCOUNTER
Patient identification was obtained as well as authorization to discuss medical history was reviewed and Diana Carey was found to be able to receive information on her father. Returned [employer] call today. She is inquiring as to her father's medical status. Currently she and her sisters spent a week with their parents at the beach and noticed significant decline in both their parents but more in their father. Her sister and she noticed that he is now having limited ROM in right shoulder/arm. He was previously told he had carpal tunnel syndrome. But Diana Carey is worried as he is now unable to lift his right arm and is using his left to move his right from place to place. Discussed his previous labs and the fact that I have offered Mr. Lily Juárez PT/OT/ST here at [facility] to help with assessing his current functioning status and need for assistance. He has declined and states \"I do not want to be tied down to anything, I will let you know when I need you\". Diana Carey would like to bring him in to have another appointment as she is concerned he is a high risk for falls and his strength is poor in all extremities. I agree with her but we can not make him do anything as he continues to decline the services we offer. She states she will tell him to accept them. I will be glad to order services as deemed necessary for his safety, health, and well being. Diana Carey states she will call and schedule an appt for her father in the next few days.

## 2018-08-10 ENCOUNTER — OFFICE VISIT (OUTPATIENT)
Dept: GERIATRIC MEDICINE | Age: 83
End: 2018-08-10

## 2018-08-10 VITALS
SYSTOLIC BLOOD PRESSURE: 140 MMHG | HEIGHT: 66 IN | DIASTOLIC BLOOD PRESSURE: 62 MMHG | TEMPERATURE: 98.6 F | BODY MASS INDEX: 22.47 KG/M2 | HEART RATE: 78 BPM | RESPIRATION RATE: 18 BRPM | OXYGEN SATURATION: 98 % | WEIGHT: 139.8 LBS

## 2018-08-10 DIAGNOSIS — R35.0 INCREASED URINARY FREQUENCY: ICD-10-CM

## 2018-08-10 DIAGNOSIS — C67.4 MALIGNANT NEOPLASM OF POSTERIOR WALL OF URINARY BLADDER (HCC): Primary | ICD-10-CM

## 2018-08-10 DIAGNOSIS — N30.01 ACUTE CYSTITIS WITH HEMATURIA: ICD-10-CM

## 2018-08-10 DIAGNOSIS — R63.0 LOSS OF APPETITE: ICD-10-CM

## 2018-08-10 DIAGNOSIS — N32.89 BLADDER SPASMS: ICD-10-CM

## 2018-08-10 DIAGNOSIS — E86.0 DEHYDRATION, MILD: ICD-10-CM

## 2018-08-10 DIAGNOSIS — R10.31 RIGHT LOWER QUADRANT ABDOMINAL PAIN: ICD-10-CM

## 2018-08-10 DIAGNOSIS — R54 FRAILTY SYNDROME IN GERIATRIC PATIENT: ICD-10-CM

## 2018-08-10 DIAGNOSIS — M25.612 SHOULDER JOINT STIFFNESS, BILATERAL: ICD-10-CM

## 2018-08-10 DIAGNOSIS — R62.7 ADULT FAILURE TO THRIVE: ICD-10-CM

## 2018-08-10 DIAGNOSIS — R35.1 NOCTURIA: ICD-10-CM

## 2018-08-10 DIAGNOSIS — R19.4 RECENT CHANGE IN FREQUENCY OF BOWEL MOVEMENTS: ICD-10-CM

## 2018-08-10 DIAGNOSIS — R63.4 EXCESSIVE WEIGHT LOSS: ICD-10-CM

## 2018-08-10 DIAGNOSIS — M25.611 SHOULDER JOINT STIFFNESS, BILATERAL: ICD-10-CM

## 2018-08-10 LAB
BILIRUB UR QL STRIP: NEGATIVE
GLUCOSE UR-MCNC: NEGATIVE MG/DL
KETONES P FAST UR STRIP-MCNC: NEGATIVE MG/DL
PH UR STRIP: 5 [PH] (ref 4.6–8)
PROT UR QL STRIP: POSITIVE
SP GR UR STRIP: 1.01 (ref 1–1.03)
UA UROBILINOGEN AMB POC: NORMAL (ref 0.2–1)
URINALYSIS CLARITY POC: CLEAR
URINALYSIS COLOR POC: NORMAL
URINE BLOOD POC: NORMAL
URINE LEUKOCYTES POC: NORMAL
URINE NITRITES POC: NEGATIVE

## 2018-08-10 RX ORDER — PHENAZOPYRIDINE HYDROCHLORIDE 100 MG/1
100 TABLET, FILM COATED ORAL
Qty: 9 TAB | Refills: 0 | Status: SHIPPED | OUTPATIENT
Start: 2018-08-10 | End: 2018-08-13

## 2018-08-10 RX ORDER — SULFAMETHOXAZOLE AND TRIMETHOPRIM 800; 160 MG/1; MG/1
1 TABLET ORAL 2 TIMES DAILY
Qty: 20 TAB | Refills: 0 | Status: SHIPPED | OUTPATIENT
Start: 2018-08-10 | End: 2018-08-20

## 2018-08-10 NOTE — PATIENT INSTRUCTIONS
Anorexia: Care Instructions  Your Care Instructions    Anorexia is a type of eating disorder. People who have anorexia don't eat enough to stay at a normal weight. Sometimes they also exercise too much. They do these things because they're so afraid of gaining weight. They believe that they're fat, even when they're thin. Often they think that losing even more weight will solve their problems and make their lives better. If you have anorexia, it can really harm your health. This is because your body doesn't get the nutrition it needs. The first step to controlling the problem is admitting that something is wrong. Then counseling can help you change how you think about food, the way you see your body, and any other emotional issues. It may take months or years, but you can recover from anorexia. Follow-up care is a key part of your treatment and safety. Be sure to make and go to all appointments, and call your doctor if you are having problems. It's also a good idea to know your test results and keep a list of the medicines you take. How can you care for yourself at home? Follow your treatment plan  · Go to your counseling sessions. Call or talk with your counselor if you can't go or if you think the sessions aren't helping. Don't just stop going. · Take your medicines exactly as prescribed. Call your doctor if you think you are having a problem with your medicine. You will get more details on the specific medicines your doctor prescribes. Trust people who are helping you  · Listen to what counselors and nutrition experts say about healthy eating. · Learn about what is included in a balanced diet. Then discuss what you learn. · Let people know how you are feeling. Listen to how they are feeling too. · Accept support and feedback from other people. Learn to be easier on yourself  · Learn to focus on your good qualities. · If your body feels weak, slow down and do less.   · Remind yourself that feeling bad about yourself is all part of your disorder. · Remember that it takes time to recover from anorexia. · Spend time with other people doing things you enjoy. · Try to focus on one goal at a time. · Don't blame yourself for your disorder. Develop healthy eating habits  · With your doctor and counselor, you will decide on a good weight for you. You will also decide how much weight you will gain each week. · Try not to argue with the people you eat with. Arguing increases stress. And stress can make make it harder for you to relax and eat. · Talk with other people during meals about things that interest you. Don't talk about food or gaining weight. Caring for a loved one with anorexia  · Remind yourself that anorexia is a long-lasting disorder. It takes time for changes to occur. · Show love and support for your loved one, especially if he or she gets discouraged. · Support your loved one as he or she goes through the steps of recovery. Focus on wellness. Don't focus on food. · Take care of yourself. Continue with your own interests, career, hobbies, and friends. · Don't blame yourself or look for the reason for the disorder. · If you are having a hard time, talk with a counselor. · Learn what to do if your loved one relapses. When should you call for help? Call 911 anytime you think you may need emergency care. For example, call if:    · A person with anorexia seems depressed and is talking about suicide. If the talk about suicide seems real, stay with the person, or ask someone you trust to stay with the person, until you get emergency help.     · You have a rapid or irregular heartbeat.     · You passed out (lost consciousness).    Call your doctor now or seek immediate medical care if:    · You feel hopeless or have thoughts of hurting yourself.    Watch closely for changes in your health, and be sure to contact your doctor if:    · You have trouble sleeping.     · You feel anxious or depressed. Where can you learn more? Go to http://thomas-jason.info/. Enter T298 in the search box to learn more about \"Anorexia: Care Instructions. \"  Current as of: December 7, 2017  Content Version: 11.7  © 9685-9814 [employer]. Care instructions adapted under license by [employer] (which disclaims liability or warranty for this information). If you have questions about a medical condition or this instruction, always ask your healthcare professional. [address] any warranty or liability for your use of this information. Bladder Cancer: Care Instructions  Your Care Instructions    Bladder cancer occurs when abnormal cells grow out of control in the bladder. It usually can be cured when it is found early. It is more common in older people. Treatment may include surgery to remove part of the bladder. If the tumor is large, the entire bladder may be removed. You may also have radiation or chemotherapy to kill the cancer cells. Sometimes people get treatment with medicines that help the body's natural defenses, or immune system, fight the cancer. Finding out that you have cancer is scary. You may feel many emotions and may need some help coping. Seek out family, friends, and counselors for support. You also can do things at home to make yourself feel better while you go through treatment. Call the [employer] (4-204.862.2787) or visit its website at [employer] [employer] for more information. Follow-up care is a key part of your treatment and safety. Be sure to make and go to all appointments, and call your doctor if you are having problems. It's also a good idea to know your test results and keep a list of the medicines you take. How can you care for yourself at home? · Take your medicines exactly as prescribed. Call your doctor if you think you are having a problem with your medicine.  You may get medicine for nausea and vomiting if you have these side effects. · Eat healthy food. If you are not hungry, try to eat food that has protein and extra calories to keep up your strength and prevent weight loss. Drink liquid meal replacements for extra calories and protein. Try to eat your main meal early. · Get some physical activity every day, but do not get too tired. Keep doing the hobbies you enjoy as your energy allows. · Take steps to control your stress and workload. Learn relaxation techniques. ¨ Share your feelings. Stress and tension affect our emotions. By expressing your feelings to others, you may be able to understand and cope with them. ¨ Consider joining a support group. Talking about a problem with your spouse, a good friend, or other people with similar problems is a good way to reduce tension and stress. ¨ Express yourself through art. Try writing, dance, art, or crafts to relieve tension. Some dance, writing, or art groups may be available just for people who have cancer. ¨ Be kind to your body and mind. Getting enough sleep, eating a healthy diet, and taking time to do things you enjoy can contribute to an overall feeling of balance in your life and help reduce stress. ¨ Get help if you need it. Discuss your concerns with your doctor or counselor. · If you are vomiting or have diarrhea:  ¨ Drink plenty of fluids (enough so that your urine is light yellow or clear like water) to prevent dehydration. Choose water and other caffeine-free clear liquids. If you have kidney, heart, or liver disease and have to limit fluids, talk with your doctor before you increase the amount of fluids you drink. ¨ When you are able to eat, try clear soups, mild foods, and liquids until all symptoms are gone for 12 to 48 hours.  Other good choices include dry toast, crackers, cooked cereal, and gelatin dessert, such as Jell-O.  · Take care of your urinary tract to prevent problems such as infection, which can be caused by bladder cancer and its treatment. Limit drinks with caffeine, drink plenty of fluids, and urinate every 3 to 4 hours. · If you have not already done so, prepare a list of advance directives. Advance directives are instructions to your doctor and family members about what kind of care you want if you become unable to speak for yourself. When should you call for help? Call your doctor now or seek immediate medical care if:    · You have pain that does not get better after taking pain medicine.     · You have symptoms of a kidney infection. These may include:  ¨ Pain or burning when you urinate. ¨ A frequent need to urinate without being able to pass much urine. ¨ Pain in the flank, which is just below the rib cage and above the waist on either side of the back. ¨ Blood in your urine. ¨ A fever.    Watch closely for changes in your health, and be sure to contact your doctor if:    · You do not get better as expected. Where can you learn more? Go to http://thomas-jason.info/. Enter M796 in the search box to learn more about \"Bladder Cancer: Care Instructions. \"  Current as of: May 12, 2017  Content Version: 11.7  © 4973-3592 [employer]. Care instructions adapted under license by [employer] (which disclaims liability or warranty for this information). If you have questions about a medical condition or this instruction, always ask your healthcare professional. [place] [address] any warranty or liability for your use of this information. Dehydration: Care Instructions  Your Care Instructions  Dehydration happens when your body loses too much fluid. This might happen when you do not drink enough water or you lose large amounts of fluids from your body because of diarrhea, vomiting, or sweating. Severe dehydration can be life-threatening. Water and minerals called electrolytes help put your body fluids back in balance.  Learn the early signs of fluid loss, and drink more fluids to prevent dehydration. Follow-up care is a key part of your treatment and safety. Be sure to make and go to all appointments, and call your doctor if you are having problems. It's also a good idea to know your test results and keep a list of the medicines you take. How can you care for yourself at home? · To prevent dehydration, drink plenty of fluids, enough so that your urine is light yellow or clear like water. Choose water and other caffeine-free clear liquids until you feel better. If you have kidney, heart, or liver disease and have to limit fluids, talk with your doctor before you increase the amount of fluids you drink. · If you do not feel like eating or drinking, try taking small sips of water, sports drinks, or other rehydration drinks. · Get plenty of rest.  To prevent dehydration  · Add more fluids to your diet and daily routine, unless your doctor has told you not to. · During hot weather, drink more fluids. Drink even more fluids if you exercise a lot. Stay away from drinks with alcohol or caffeine. · Watch for the symptoms of dehydration. These include:  ¨ A dry, sticky mouth. ¨ Dark yellow urine, and not much of it. ¨ Dry and sunken eyes. ¨ Feeling very tired. · Learn what problems can lead to dehydration. These include:  ¨ Diarrhea, fever, and vomiting. ¨ Any illness with a fever, such as pneumonia or the flu. ¨ Activities that cause heavy sweating, such as endurance races and heavy outdoor work in hot or humid weather. ¨ Alcohol or drug abuse or withdrawal.  ¨ Certain medicines, such as cold and allergy pills (antihistamines), diet pills (diuretics), and laxatives. ¨ Certain diseases, such as diabetes, cancer, and heart or kidney disease. When should you call for help? Call 911 anytime you think you may need emergency care.  For example, call if:    · You passed out (lost consciousness).    Call your doctor now or seek immediate medical care if:    · You are confused and cannot think clearly.     · You are dizzy or lightheaded, or you feel like you may faint.     · You have signs of needing more fluids. You have sunken eyes and a dry mouth, and you pass only a little dark urine.     · You cannot keep fluids down.    Watch closely for changes in your health, and be sure to contact your doctor if:    · You are not making tears.     · Your skin is very dry and sags slowly back into place after you pinch it.     · Your mouth and eyes are very dry. Where can you learn more? Go to http://thomas-jason.info/. Enter D770 in the search box to learn more about \"Dehydration: Care Instructions. \"  Current as of: November 20, 2017  Content Version: 11.7  © 2043-6464 [employer]. Care instructions adapted under license by [employer] (which disclaims liability or warranty for this information). If you have questions about a medical condition or this instruction, always ask your healthcare professional. [place] [address] any warranty or liability for your use of this information.

## 2018-08-10 NOTE — PROGRESS NOTES
ADVISED PATIENT OF THE FOLLOWING HEALTH MAINTAINCE DUE  Health Maintenance Due   Topic Date Due    GLAUCOMA SCREENING Q2Y  01/10/1993    DTaP/Tdap/Td series (2 - Td) 01/01/2016    Influenza Age 5 to Adult  08/01/2018    Pneumococcal 65+ High/Highest Risk (2 of 2 - PPSV23) 08/03/2018      Chief Complaint   Patient presents with    Follow-up     Patient here for follow up     Abdominal Pain     Patient reports having abdominalpain at night. He gets up every hour at night use use the bathroom. 1. Have you been to the ER, urgent care clinic since your last visit? Hospitalized since your last visit? No    2. Have you seen or consulted any other health care providers outside of the [facility] since your last visit? Include any DEXA scan, mammography  or colon screening. No    3. Do you have an Advance Directive on file? no    4. Do you have a DNR on file? Full        Patient is accompanied by self I have received verbal consent from Antonio Darden to discuss any/all medical information while they are present in the room.     Advance Care Planning 8/10/2018   Patient's Healthcare Decision Maker is: Legal Next of Dary Engel   Primary Decision Maker Name Wyonia Ahumada   Primary Decision Maker Phone Number [phone]   Primary Decision Maker Relationship to Patient Spouse   Confirm Advance Directive None   Patient Would Like to Complete Advance Directive [facility] [address] [place] [address] AT [address] [place] [address],[facility]  [place] [address] [id number]  Phone: [phone] Fax: [phone]      Results for orders placed or performed in visit on 08/10/18   AMB POC URINALYSIS DIP STICK MANUAL W/O MICRO   Result Value Ref Range    Color (UA POC) Light Yellow     Clarity (UA POC) Clear     Glucose (UA POC) Negative Negative    Bilirubin (UA POC) Negative Negative    Ketones (UA POC) Negative Negative    Specific gravity (UA POC) 1.015 1.001 - 1.035    Blood (UA POC) 4+ Negative    pH (UA POC) 5.0 4.6 - 8.0    Protein (UA POC) Positive Negative    Urobilinogen (UA POC) normal 0.2 - 1    Nitrites (UA POC) Negative Negative    Leukocyte esterase (UA POC) 4+ Negative

## 2018-08-11 LAB — AMMONIA PLAS-MCNC: 34 UG/DL (ref 27–102)

## 2018-08-13 ENCOUNTER — OFFICE VISIT (OUTPATIENT)
Dept: GERIATRIC MEDICINE | Age: 83
End: 2018-08-13

## 2018-08-13 VITALS
TEMPERATURE: 97.6 F | OXYGEN SATURATION: 97 % | RESPIRATION RATE: 20 BRPM | HEART RATE: 62 BPM | BODY MASS INDEX: 22.4 KG/M2 | DIASTOLIC BLOOD PRESSURE: 59 MMHG | WEIGHT: 139.4 LBS | HEIGHT: 66 IN | SYSTOLIC BLOOD PRESSURE: 108 MMHG

## 2018-08-13 DIAGNOSIS — I25.111 CORONARY ARTERY DISEASE INVOLVING NATIVE CORONARY ARTERY OF NATIVE HEART WITH ANGINA PECTORIS WITH DOCUMENTED SPASM (HCC): ICD-10-CM

## 2018-08-13 DIAGNOSIS — I25.810 CORONARY ARTERY DISEASE INVOLVING CORONARY BYPASS GRAFT OF NATIVE HEART WITHOUT ANGINA PECTORIS: ICD-10-CM

## 2018-08-13 DIAGNOSIS — I49.9 IRREGULAR HEART RATE: ICD-10-CM

## 2018-08-13 DIAGNOSIS — R63.0 LOSS OF APPETITE: ICD-10-CM

## 2018-08-13 DIAGNOSIS — C67.4 MALIGNANT NEOPLASM OF POSTERIOR WALL OF URINARY BLADDER (HCC): ICD-10-CM

## 2018-08-13 DIAGNOSIS — N30.01 ACUTE CYSTITIS WITH HEMATURIA: ICD-10-CM

## 2018-08-13 DIAGNOSIS — R69 CHRONIC ILLNESS: ICD-10-CM

## 2018-08-13 DIAGNOSIS — R00.1 BRADYCARDIA WITH 51-60 BEATS PER MINUTE: ICD-10-CM

## 2018-08-13 DIAGNOSIS — E86.0 DEHYDRATION, MILD: Primary | ICD-10-CM

## 2018-08-13 DIAGNOSIS — N32.89 BLADDER SPASMS: ICD-10-CM

## 2018-08-13 DIAGNOSIS — R35.0 INCREASED URINARY FREQUENCY: ICD-10-CM

## 2018-08-13 DIAGNOSIS — R62.7 ADULT FAILURE TO THRIVE: ICD-10-CM

## 2018-08-13 DIAGNOSIS — R63.4 EXCESSIVE WEIGHT LOSS: ICD-10-CM

## 2018-08-13 DIAGNOSIS — Z71.2 ENCOUNTER TO DISCUSS TEST RESULTS: ICD-10-CM

## 2018-08-13 LAB
ALBUMIN SERPL-MCNC: 4.1 G/DL (ref 3.2–4.6)
ALBUMIN/GLOB SERPL: 1.6 {RATIO} (ref 1.2–2.2)
ALP SERPL-CCNC: 126 IU/L (ref 39–117)
ALT SERPL-CCNC: 13 IU/L (ref 0–44)
AMYLASE SERPL-CCNC: 62 U/L (ref 31–124)
APPEARANCE UR: ABNORMAL
AST SERPL-CCNC: 18 IU/L (ref 0–40)
BACTERIA #/AREA URNS HPF: ABNORMAL /[HPF]
BACTERIA UR CULT: NORMAL
BASOPHILS # BLD AUTO: 0 X10E3/UL (ref 0–0.2)
BASOPHILS NFR BLD AUTO: 1 %
BILIRUB SERPL-MCNC: 0.6 MG/DL (ref 0–1.2)
BILIRUB UR QL STRIP: NEGATIVE
BUN SERPL-MCNC: 16 MG/DL (ref 10–36)
BUN/CREAT SERPL: 26 (ref 10–24)
CALCIUM SERPL-MCNC: 9.3 MG/DL (ref 8.6–10.2)
CASTS URNS QL MICRO: ABNORMAL /LPF
CHLORIDE SERPL-SCNC: 95 MMOL/L (ref 96–106)
CO2 SERPL-SCNC: 22 MMOL/L (ref 20–29)
COLOR UR: YELLOW
CREAT SERPL-MCNC: 0.61 MG/DL (ref 0.76–1.27)
DX ICD CODE: NORMAL
EOSINOPHIL # BLD AUTO: 0.1 X10E3/UL (ref 0–0.4)
EOSINOPHIL NFR BLD AUTO: 1 %
EPI CELLS #/AREA URNS HPF: ABNORMAL /HPF
ERYTHROCYTE [DISTWIDTH] IN BLOOD BY AUTOMATED COUNT: 14.2 % (ref 12.3–15.4)
GLOBULIN SER CALC-MCNC: 2.5 G/DL (ref 1.5–4.5)
GLUCOSE POC: 178 MG/DL (ref 60–400)
GLUCOSE SERPL-MCNC: 147 MG/DL (ref 65–99)
GLUCOSE UR QL: NEGATIVE
HCT VFR BLD AUTO: 38.8 % (ref 37.5–51)
HGB BLD-MCNC: 12.5 G/DL (ref 13–17.7)
HGB UR QL STRIP: ABNORMAL
IMM GRANULOCYTES # BLD: 0 X10E3/UL (ref 0–0.1)
IMM GRANULOCYTES NFR BLD: 0 %
KETONES UR QL STRIP: NEGATIVE
LACTATE SERPL-MCNC: 17.5 MG/DL (ref 4.8–25.7)
LDH SERPL-CCNC: 245 IU/L (ref 121–224)
LEUKOCYTE ESTERASE UR QL STRIP: ABNORMAL
LIPASE SERPL-CCNC: 32 U/L (ref 13–78)
LYMPHOCYTES # BLD AUTO: 1 X10E3/UL (ref 0.7–3.1)
LYMPHOCYTES NFR BLD AUTO: 16 %
MAGNESIUM SERPL-MCNC: 2 MG/DL (ref 1.6–2.3)
MCH RBC QN AUTO: 31.2 PG (ref 26.6–33)
MCHC RBC AUTO-ENTMCNC: 32.2 G/DL (ref 31.5–35.7)
MCV RBC AUTO: 97 FL (ref 79–97)
MICRO URNS: ABNORMAL
MONOCYTES # BLD AUTO: 0.5 X10E3/UL (ref 0.1–0.9)
MONOCYTES NFR BLD AUTO: 9 %
NEUTROPHILS # BLD AUTO: 4.3 X10E3/UL (ref 1.4–7)
NEUTROPHILS NFR BLD AUTO: 73 %
NITRITE UR QL STRIP: NEGATIVE
PATH REPORT.COMMENTS IMP SPEC: NORMAL
PATH REPORT.FINAL DX SPEC: NORMAL
PATH REPORT.GROSS SPEC: NORMAL
PATH REPORT.SITE OF ORIGIN SPEC: NORMAL
PATHOLOGIST NAME: NORMAL
PERFORMED BY, 191120: NORMAL
PH UR STRIP: 5.5 [PH] (ref 5–7.5)
PLATELET # BLD AUTO: 224 X10E3/UL (ref 150–379)
POTASSIUM SERPL-SCNC: 4.2 MMOL/L (ref 3.5–5.2)
PREALB SERPL-MCNC: 18 MG/DL (ref 9–32)
PROT SERPL-MCNC: 6.6 G/DL (ref 6–8.5)
PROT UR QL STRIP: ABNORMAL
RBC # BLD AUTO: 4.01 X10E6/UL (ref 4.14–5.8)
RBC #/AREA URNS HPF: ABNORMAL /HPF
SODIUM SERPL-SCNC: 132 MMOL/L (ref 134–144)
SP GR UR: 1.01 (ref 1–1.03)
URINALYSIS REFLEX, 377202: ABNORMAL
UROBILINOGEN UR STRIP-MCNC: 0.2 MG/DL (ref 0.2–1)
WBC # BLD AUTO: 5.9 X10E3/UL (ref 3.4–10.8)
WBC #/AREA URNS HPF: >30 /HPF

## 2018-08-13 NOTE — PATIENT INSTRUCTIONS
Anorexia: Care Instructions  Your Care Instructions    Anorexia is a type of eating disorder. People who have anorexia don't eat enough to stay at a normal weight. Sometimes they also exercise too much. They do these things because they're so afraid of gaining weight. They believe that they're fat, even when they're thin. Often they think that losing even more weight will solve their problems and make their lives better. If you have anorexia, it can really harm your health. This is because your body doesn't get the nutrition it needs. The first step to controlling the problem is admitting that something is wrong. Then counseling can help you change how you think about food, the way you see your body, and any other emotional issues. It may take months or years, but you can recover from anorexia. Follow-up care is a key part of your treatment and safety. Be sure to make and go to all appointments, and call your doctor if you are having problems. It's also a good idea to know your test results and keep a list of the medicines you take. How can you care for yourself at home? Follow your treatment plan  · Go to your counseling sessions. Call or talk with your counselor if you can't go or if you think the sessions aren't helping. Don't just stop going. · Take your medicines exactly as prescribed. Call your doctor if you think you are having a problem with your medicine. You will get more details on the specific medicines your doctor prescribes. Trust people who are helping you  · Listen to what counselors and nutrition experts say about healthy eating. · Learn about what is included in a balanced diet. Then discuss what you learn. · Let people know how you are feeling. Listen to how they are feeling too. · Accept support and feedback from other people. Learn to be easier on yourself  · Learn to focus on your good qualities. · If your body feels weak, slow down and do less.   · Remind yourself that feeling bad about yourself is all part of your disorder. · Remember that it takes time to recover from anorexia. · Spend time with other people doing things you enjoy. · Try to focus on one goal at a time. · Don't blame yourself for your disorder. Develop healthy eating habits  · With your doctor and counselor, you will decide on a good weight for you. You will also decide how much weight you will gain each week. · Try not to argue with the people you eat with. Arguing increases stress. And stress can make make it harder for you to relax and eat. · Talk with other people during meals about things that interest you. Don't talk about food or gaining weight. Caring for a loved one with anorexia  · Remind yourself that anorexia is a long-lasting disorder. It takes time for changes to occur. · Show love and support for your loved one, especially if he or she gets discouraged. · Support your loved one as he or she goes through the steps of recovery. Focus on wellness. Don't focus on food. · Take care of yourself. Continue with your own interests, career, hobbies, and friends. · Don't blame yourself or look for the reason for the disorder. · If you are having a hard time, talk with a counselor. · Learn what to do if your loved one relapses. When should you call for help? Call 911 anytime you think you may need emergency care. For example, call if:    · A person with anorexia seems depressed and is talking about suicide. If the talk about suicide seems real, stay with the person, or ask someone you trust to stay with the person, until you get emergency help.     · You have a rapid or irregular heartbeat.     · You passed out (lost consciousness).    Call your doctor now or seek immediate medical care if:    · You feel hopeless or have thoughts of hurting yourself.    Watch closely for changes in your health, and be sure to contact your doctor if:    · You have trouble sleeping.     · You feel anxious or depressed. Where can you learn more? Go to http://thomas-jason.info/. Enter D660 in the search box to learn more about \"Anorexia: Care Instructions. \"  Current as of: December 7, 2017  Content Version: 11.7  © 2262-8629 [employer]. Care instructions adapted under license by [employer] (which disclaims liability or warranty for this information). If you have questions about a medical condition or this instruction, always ask your healthcare professional. [address] any warranty or liability for your use of this information. Bladder Cancer: Care Instructions  Your Care Instructions    Bladder cancer occurs when abnormal cells grow out of control in the bladder. It usually can be cured when it is found early. It is more common in older people. Treatment may include surgery to remove part of the bladder. If the tumor is large, the entire bladder may be removed. You may also have radiation or chemotherapy to kill the cancer cells. Sometimes people get treatment with medicines that help the body's natural defenses, or immune system, fight the cancer. Finding out that you have cancer is scary. You may feel many emotions and may need some help coping. Seek out family, friends, and counselors for support. You also can do things at home to make yourself feel better while you go through treatment. Call the [employer] (8-400.410.6331) or visit its website at [employer] for more information. Follow-up care is a key part of your treatment and safety. Be sure to make and go to all appointments, and call your doctor if you are having problems. It's also a good idea to know your test results and keep a list of the medicines you take. How can you care for yourself at home? · Take your medicines exactly as prescribed. Call your doctor if you think you are having a problem with your medicine.  You may get medicine for nausea and vomiting if you have these side effects. · Eat healthy food. If you are not hungry, try to eat food that has protein and extra calories to keep up your strength and prevent weight loss. Drink liquid meal replacements for extra calories and protein. Try to eat your main meal early. · Get some physical activity every day, but do not get too tired. Keep doing the hobbies you enjoy as your energy allows. · Take steps to control your stress and workload. Learn relaxation techniques. ¨ Share your feelings. Stress and tension affect our emotions. By expressing your feelings to others, you may be able to understand and cope with them. ¨ Consider joining a support group. Talking about a problem with your spouse, a good friend, or other people with similar problems is a good way to reduce tension and stress. ¨ Express yourself through art. Try writing, dance, art, or crafts to relieve tension. Some dance, writing, or art groups may be available just for people who have cancer. ¨ Be kind to your body and mind. Getting enough sleep, eating a healthy diet, and taking time to do things you enjoy can contribute to an overall feeling of balance in your life and help reduce stress. ¨ Get help if you need it. Discuss your concerns with your doctor or counselor. · If you are vomiting or have diarrhea:  ¨ Drink plenty of fluids (enough so that your urine is light yellow or clear like water) to prevent dehydration. Choose water and other caffeine-free clear liquids. If you have kidney, heart, or liver disease and have to limit fluids, talk with your doctor before you increase the amount of fluids you drink. ¨ When you are able to eat, try clear soups, mild foods, and liquids until all symptoms are gone for 12 to 48 hours.  Other good choices include dry toast, crackers, cooked cereal, and gelatin dessert, such as Jell-O.  · Take care of your urinary tract to prevent problems such as infection, which can be caused by bladder cancer and its treatment. Limit drinks with caffeine, drink plenty of fluids, and urinate every 3 to 4 hours. · If you have not already done so, prepare a list of advance directives. Advance directives are instructions to your doctor and family members about what kind of care you want if you become unable to speak for yourself. When should you call for help? Call your doctor now or seek immediate medical care if:    · You have pain that does not get better after taking pain medicine.     · You have symptoms of a kidney infection. These may include:  ¨ Pain or burning when you urinate. ¨ A frequent need to urinate without being able to pass much urine. ¨ Pain in the flank, which is just below the rib cage and above the waist on either side of the back. ¨ Blood in your urine. ¨ A fever.    Watch closely for changes in your health, and be sure to contact your doctor if:    · You do not get better as expected. Where can you learn more? Go to http://thomasLinkConnector Corporationjason.info/. Enter M796 in the search box to learn more about \"Bladder Cancer: Care Instructions. \"  Current as of: May 12, 2017  Content Version: 11.7  © 8804-7292 [employer]. Care instructions adapted under license by [employer] (which disclaims liability or warranty for this information). If you have questions about a medical condition or this instruction, always ask your healthcare professional. [place] [address] any warranty or liability for your use of this information. Bradycardia: Care Instructions  Your Care Instructions    Bradycardia is a slow heart rate. If your heart beats too slowly, it can't supply your body with enough blood. This can make you weak or dizzy. Or it may make you pass out. Sometimes medicine can cause this problem. If this happens, your doctor may have you adjust one of your medicines. If a medicine is not the problem, your doctor may recommend a pacemaker.   It is important to treat bradycardia so that you don't get more serious health problems. Your doctor will want to see you on a routine schedule to make sure that your heartbeat is normal.  Follow-up care is a key part of your treatment and safety. Be sure to make and go to all appointments, and call your doctor if you are having problems. It's also a good idea to know your test results and keep a list of the medicines you take. How can you care for yourself at home? · Take your medicines exactly as prescribed. Call your doctor if you think you are having a problem with your medicine. If your bradycardia is caused by another disease, your doctor will try to treat the disease. If it is caused by heart medicines, he or she will adjust your medicines. · Make lifestyle changes to improve your heart health. ¨ Get regular exercise. Try for 30 minutes on most days of the week. If you do not have other heart problems, you likely do not have limits on the type or level of activity that you can do. You may want to walk, swim, bike, or do other activities. Ask your doctor what level of exercise is safe for you. ¨ To control your cholesterol, avoid foods with a lot of fat, saturated fat, or sodium. Try to eat more fiber. And if your doctor says it's okay, get some exercise on most days. ¨ Do not smoke. Smoking can make your heart condition worse. If you need help quitting, talk to your doctor about stop-smoking programs and medicines. These can increase your chances of quitting for good. ¨ Limit alcohol to 2 drinks a day for men and 1 drink a day for women. Too much alcohol can cause health problems. Pacemaker  If you have a pacemaker, you will get more specific information about it. Be sure to:  · Check your pulse as your doctor tells you. · Have your pacemaker checked as often as your doctor recommends. You may be able to do this over the phone or computer. · Avoid strong magnetic or electrical fields.  These include MRIs, welding equipment, and generators. · You will be checked several times right after you get your pacemaker and when it is time to have the battery changed. Batteries last for 5 to 15 years. · You can talk on a cell phone. But keep it 6 inches away from your pacemaker. · Microwaves, TVs, radios, and kitchen and bathroom appliances won't harm you. When should you call for help? Call 911 anytime you think you may need emergency care. For example, call if:    · You have symptoms of sudden heart failure. These may include:  ¨ Severe trouble breathing. ¨ A fast or irregular heartbeat. ¨ Coughing up pink, foamy mucus. ¨ You passed out.     · You have symptoms of a stroke. These may include:  ¨ Sudden numbness, tingling, weakness, or loss of movement in your face, arm, or leg, especially on only one side of your body. ¨ Sudden vision changes. ¨ Sudden trouble speaking. ¨ Sudden confusion or trouble understanding simple statements. ¨ Sudden problems with walking or balance. ¨ A sudden, severe headache that is different from past headaches.    Call your doctor now or seek immediate medical care if:    · You have new or changed symptoms of heart failure, such as:  ¨ New or increased shortness of breath. ¨ New or worse swelling in your legs, ankles, or feet. ¨ Sudden weight gain, such as more than 2 to 3 pounds in a day or 5 pounds in a week. (Your doctor may suggest a different range of weight gain.)  ¨ Feeling dizzy or lightheaded or like you may faint. ¨ Feeling so tired or weak that you cannot do your usual activities. ¨ Not sleeping well. Shortness of breath wakes you at night. You need extra pillows to prop yourself up to breathe easier.    Watch closely for changes in your health, and be sure to contact your doctor if:    · You do not get better as expected. Where can you learn more? Go to http://thomas-jason.info/.   Enter N922 in the search box to learn more about \"Bradycardia: Care Instructions. \"  Current as of: December 6, 2017  Content Version: 11.7  © 2490-1107 [employer]. Care instructions adapted under license by [employer] (which disclaims liability or warranty for this information). If you have questions about a medical condition or this instruction, always ask your healthcare professional. [facility] [address] any warranty or liability for your use of this information. Cardiac Arrhythmia: Care Instructions  Your Care Instructions    A cardiac arrhythmia is a change in the normal rhythm of the heart. Your heart may beat too fast or too slow or beat with an irregular or skipping rhythm. A change in the heart's rhythm may feel like a really strong heartbeat or a fluttering in your chest. A severe heart rhythm problem can keep the body from getting the blood it needs. This can result in shortness of breath, lightheadedness, and fainting. You may take medicine to treat your condition. Your doctor may recommend a pacemaker or recommend catheter ablation to destroy small parts of the heart that are causing a rhythm problem. Another possible treatment is an implantable cardioverter-defibrillator (ICD). An ICD is a device that gives the heart a shock to return the heart to a normal rhythm. Follow-up care is a key part of your treatment and safety. Be sure to make and go to all appointments, and call your doctor if you are having problems. It's also a good idea to know your test results and keep a list of the medicines you take. How can you care for yourself at home?  [facility] care    · Be safe with medicines. Take your medicines exactly as prescribed. Call your doctor if you think you are having a problem with your medicine.  You will get more details on the specific medicines your doctor prescribes.     · If you received a pacemaker or an ICD, you will get a fact sheet about it.     · Wear medical alert jewelry that says you have an abnormal heart rhythm. You can buy this at most drugstores.    Lifestyle changes    · Eat a heart-healthy diet.     · Stay at a healthy weight. Lose weight if you need to.     · Avoid nicotine, too much alcohol, and illegal drugs (meth, speed, and cocaine). Also, get enough sleep and do not overeat.     · Ask your doctor whether you can take over-the-counter medicines (such as decongestants). These can make your heart beat fast.     · Talk to your doctor about any limits to activities, such as driving, or tasks where you use power tools or ladders. Activity    · Start light exercise if your doctor says you can. Even a small amount will help you get stronger, have more energy, and manage your stress.     · Get regular exercise. Try for 30 minutes on most days of the week. Ask your doctor what level of exercise is safe for you. If activity is not likely to cause health problems, you probably do not have limits on the type or level of activity that you can do. You may want to walk, swim, bike, or do other activities.     · When you exercise, watch for signs that your heart is working too hard. You are pushing too hard if you cannot talk while you exercise. If you become short of breath or dizzy or have chest pain, sit down and rest.     · Check your pulse daily. Place two fingers on the artery at the palm side of your wrist, in line with your thumb. If your heartbeat seems uneven, talk to your doctor. When should you call for help? Call 911 anytime you think you may need emergency care. For example, call if:    · You have symptoms of sudden heart failure. These may include:  ¨ Severe trouble breathing. ¨ A fast or irregular heartbeat. ¨ Coughing up pink, foamy mucus. ¨ You passed out.     · You have signs of a stroke. These include:  ¨ Sudden numbness, paralysis, or weakness in your face, arm, or leg, especially on only one side of your body. ¨ New problems with walking or balance. ¨ Sudden vision changes.   ¨ Drooling or slurred speech. ¨ New problems speaking or understanding simple statements, or feeling confused. ¨ A sudden, severe headache that is different from past headaches.    Call your doctor now or seek immediate medical care if:    · You have new or changed symptoms of heart failure, such as:  ¨ New or increased shortness of breath. ¨ New or worse swelling in your legs, ankles, or feet. ¨ Sudden weight gain, such as more than 2 to 3 pounds in a day or 5 pounds in a week. (Your doctor may suggest a different range of weight gain.)  ¨ Feeling dizzy or lightheaded or like you may faint. ¨ Feeling so tired or weak that you cannot do your usual activities. ¨ Not sleeping well. Shortness of breath wakes you at night. You need extra pillows to prop yourself up to breathe easier.    Watch closely for changes in your health, and be sure to contact your doctor if:    · You do not get better as expected. Where can you learn more? Go to http://thomas-jason.info/. Enter S614 in the search box to learn more about \"Cardiac Arrhythmia: Care Instructions. \"  Current as of: December 6, 2017  Content Version: 11.7  © 7237-5643 [employer]. Care instructions adapted under license by [employer] (which disclaims liability or warranty for this information). If you have questions about a medical condition or this instruction, always ask your healthcare professional. [place] [address] any warranty or liability for your use of this information.

## 2018-08-13 NOTE — PROGRESS NOTES
Reason for Visit/HPI:    Katelyn Lugo is a 80 y.o. male patient who presents today for   Chief Complaint   Patient presents with    Urgency     Patient here for follow up, he reports some inprovement after starting the medication over the week end. Diagnosis/Treatment Plan:    Diagnoses and all orders for this visit:    1. Dehydration, mild ; Mr Adria Kay has done well with hydrating himself over the weekend. He reports less urinary urgency as well as getting a tiny bit more rest then he had been. His wife states he has not been getting up as often but believes this is just how he has conditioned himself and now it is out of habit. Discussed giving him some IV fluids to just give him a bit of bump in electrolytes. He and his family agrees. We will have an IV cath placed and start with 1 - 500 ml bag of Normal Saline to run in slowly over an hour. My nurse will complete this task and I will reassess following the infusion.   -     WA PLACE CATH IN VEIN,SVC,IVC  -     IV INFUSION, HYDRATION, 1ST HOUR  -     sodium chloride 0.9 %; 500 mL by IntraVENous route once for 1 dose. 2. Malignant neoplasm of posterior wall of urinary bladder ([facility] [address] ; discussed at length in the office visit today as I wanted to make sure we were all on the same page related to Mr. Craft's current status as he has active bladder cancer. We do not know the status as far as has it spread from the bladder or is it localized in the bladder? They have agreed to move forward with more diagnostics to see if we can find out some answers as well as prognosis. Again, Dr. Brian Sosa did not recommend completing another cystoscopy with resection as this would be the 3rd occurrence. 3. Acute cystitis with hematuria ; continuing to resolve, on antibiotics and has improved as he is not having the pulling in his abdomen and urgency has slowed down.    -     WA PLACE CATH IN VEIN,SVC,IVC  -     sodium chloride 0.9 %; 500 mL by IntraVENous route once for 1 dose. 4. Increased urinary frequency  -     AL PLACE CATH IN VEIN,SVC,IVC  -     sodium chloride 0.9 %; 500 mL by IntraVENous route once for 1 dose. 5. Bladder spasms ; doing well with the Pyridium but he continues to forget to take it per daughter.   -     AL PLACE CATH IN VEIN,SVC,IVC  -     sodium chloride 0.9 %; 500 mL by IntraVENous route once for 1 dose. 6. Excessive weight loss ; see #2.     7. Loss of appetite ; in the future we may attempt an appetite stimulant to help with nutrients. 8. Adult failure to thrive ; ongoing as he has lost 7# since June 20th as well as he is here for another UTI. The last one was treated in the beginning of July 2018. He is unsure of how long he has been tolerating the symptoms.   -     AL PLACE CATH IN VEIN,SVC,IVC  -     IV INFUSION, HYDRATION, 1ST HOUR  -     sodium chloride 0.9 %; 500 mL by IntraVENous route once for 1 dose. 9. Chronic illness ; He has significant chronic illness with CAD, HTN, Previous Heart Bypass, DJD, weight loss, bladder CA. He continues to loose weight and we have discussed the options for moving forward with the trying to get an answer related to the reason he is decompensating more rapidly then the family thinks is appropriate. He has also not been keeping up with his medications and has provided lists that are not up to date. His daughter brought in all his medication bottles this afternoon and the current list in this note is the correct list. I have recommended that someone help him with medication administration as there are significant safety concerns as well as compliance issues. His daughter is not completely supportive of this assessment but his wife has agreed he needs some back up support and I will supply them with a list of the medication for administration. -     2D ECHO COMPLETE ADULT (TTE) W OR WO CONTR; Future  -     CARDIAC HOLTER MONITOR, 24 HOURS; Future    10.  Encounter to discuss test results ; reviewed labs from Friday 8/10/18. 11. Coronary artery disease involving native coronary artery of native heart with angina pectoris with documented spasm ([facility] [address] ; Currently Mr. Craft has a follow up appointment with Dr. Wu Morris in Nov 2018. I am recommending we get some testing as his blood pressure continues to drop as well as his HR and he is on Losartan 100 mg daily. I am wondering if he doesn't need this dose of BP meds as his HR continues to hand in the lower 50's and 60's. I will consult with Dr. Wu Morris once I have more information following the testing to get a better idea of the problem. The patient and family are in agreement with the plan. -     2D ECHO COMPLETE ADULT (TTE) W OR WO CONTR; Future  -     CARDIAC HOLTER MONITOR, 24 HOURS; Future    12. Coronary artery disease involving coronary bypass graft of native heart without angina pectoris  -     2D ECHO COMPLETE ADULT (TTE) W OR WO CONTR; Future  -     CARDIAC HOLTER MONITOR, 24 HOURS; Future    13. Bradycardia with 51-60 beats per minute  -     2D ECHO COMPLETE ADULT (TTE) W OR WO CONTR; Future  -     CARDIAC HOLTER MONITOR, 24 HOURS; Future    14. Irregular heart rate  -     2D ECHO COMPLETE ADULT (TTE) W OR WO CONTR; Future  -     CARDIAC HOLTER MONITOR, 24 HOURS; Future    Prolonged Face to Face time discussing treatment options, diagnostic options, laboratory results, plan of care, and trajectory of illness with family and apartment. Start F to F- 1315  End F to F - 1500  Total prolonged care provided face to face - 105 mins total    Discontinued Care:     The following treatment modalities have been discontinued by the provider today:   Medications Discontinued During This Encounter   Medication Reason    acetaminophen-codeine (TYLENOL-CODEINE #3) 300-30 mg per tablet Not A Current Medication    doxazosin (CARDURA) 8 mg tablet Not A Current Medication    loratadine (CLARITIN) 10 mg tablet Not A Current Medication    diclofenac (VOLTAREN) 1 % gel Not A Current Medication    darifenacin (ENABLEX) 15 mg ER tablet Not A Current Medication    omeprazole (PRILOSEC) 20 mg capsule Not A Current Medication    simvastatin (ZOCOR) 80 mg tablet Not A Current Medication     Follow Up: Follow-up Disposition:  Return in about 3 days (around 8/16/2018). Subjective: Allergies   Allergen Reactions    Ace Inhibitors Hives    Lisinopril Hives     HIVES, ORAL SWELLING     Prior to Admission medications    Medication Sig Start Date End Date Taking? Authorizing Provider   sodium chloride 0.9 % 500 mL by IntraVENous route once for 1 dose. 8/13/18 8/13/18 Yes Vilma Jacome NP   trimethoprim-sulfamethoxazole (BACTRIM DS, SEPTRA DS) 160-800 mg per tablet Take 1 Tab by mouth two (2) times a day for 10 days. 8/10/18 8/20/18 Yes Vilma Jacome NP   phenazopyridine (PYRIDIUM) 100 mg tablet Take 1 Tab by mouth three (3) times daily as needed for up to 3 days. 8/10/18 8/13/18 Yes Vilma Jacome NP   Saccharomyces boulardii (FLORASTOR) 250 mg capsule Take 1 Cap by mouth daily. 6/11/18  Yes Vilma Jacome NP   carvedilol (COREG) 3.125 mg tablet Take  by mouth two (2) times daily (with meals). Yes Historical Provider   atorvastatin (LIPITOR) 40 mg tablet Take 40 mg by mouth every evening. Yes Historical Provider   losartan (COZAAR) 100 mg tablet Take 100 mg by mouth daily. Yes Historical Provider   aspirin delayed-release 81 mg tablet Take 81 mg by mouth daily. Yes Historical Provider   dutaseride (AVODART) 0.5 mg capsule Take 0.5 mg by mouth daily.    Yes Historical Provider     Past Medical History:   Diagnosis Date    Arthritis     Atherosclerotic heart disease of native coronary artery without angina pectoris 11/10/2017    Benign prostatic hyperplasia without lower urinary tract symptoms 11/10/2017    CAD (coronary artery disease)     HX CABG    Cancer (HCC)     BLADDER    Carcinoma of lateral wall of urinary bladder (HCC)     Chronic pain     Essential (primary) hypertension 11/10/2017    Essential hypertension 6/8/2018    GERD (gastroesophageal reflux disease)     History of colonoscopy 05/22/2014    Hypercholesterolemia     Hyperlipemia 11/10/2017    Microscopic hematuria     Osteoarthritis of left knee 2/1/2011    Other ill-defined conditions(799.89)     BPH     Past Surgical History:   Procedure Laterality Date    CARDIAC SURG PROCEDURE UNLIST  1999    CABG    HX CATARACT REMOVAL Bilateral 07/19/2016    HX COLONOSCOPY      HX GI      ESOPH DIL.     HX HEENT      TONSILLECTOMY    HX ORTHOPAEDIC  2008    R KNEE REPLACE    HX ORTHOPAEDIC  2004    ARTHROSCOPY R KNEE    HX ORTHOPAEDIC  2002    ARTHROSCOPY L KNEE    HX OTHER SURGICAL      ENDOSCOPY-X2    HX PROSTATECTOMY      HX TURP  11/02/2015    HX UROLOGICAL  11/02/2015    Transurethral resection of bladder tumor    TOTAL KNEE ARTHROPLASTY  2011    LEFT      Social History   Substance Use Topics    Smoking status: Never Smoker    Smokeless tobacco: Never Used    Alcohol use 2.0 oz/week     4 Glasses of wine per week      Comment: 4-5 OZ WINE DAILY      Family History   Problem Relation Age of Onset    Heart Disease Father     Cancer Mother    Mortimer Peaches Problems Neg Hx      Advance Care Planning 8/13/2018   Patient's Healthcare Decision Maker is: Legal Next of Dary 69   Primary Decision Maker Name Payam Aaron   Primary Decision Maker Phone Number [phone]   Primary Decision Maker Relationship to Patient -   Confirm Advance Directive -   Patient Would Like to Complete Advance Directive -     Latest Laboratory Results:     Lab Results   Component Value Date/Time    WBC 5.9 08/10/2018 04:09 PM    HGB 12.5 (L) 08/10/2018 04:09 PM    HCT 38.8 08/10/2018 04:09 PM    PLATELET 106 78/21/0528 04:09 PM    MCV 97 08/10/2018 04:09 PM     Lab Results   Component Value Date/Time    Sodium 132 (L) 08/10/2018 04:09 PM    Potassium 4.2 08/10/2018 04:09 PM    Chloride 95 (L) 08/10/2018 04:09 PM CO2 22 08/10/2018 04:09 PM    Anion gap 8 10/15/2015 02:55 PM    Glucose 147 (H) 08/10/2018 04:09 PM    BUN 16 08/10/2018 04:09 PM    Creatinine 0.61 (L) 08/10/2018 04:09 PM    BUN/Creatinine ratio 26 (H) 08/10/2018 04:09 PM    GFR est  08/10/2018 04:09 PM    GFR est non-AA 88 08/10/2018 04:09 PM    Calcium 9.3 08/10/2018 04:09 PM    Bilirubin, total 0.6 08/10/2018 04:09 PM    AST (SGOT) 18 08/10/2018 04:09 PM    Alk. phosphatase 126 (H) 08/10/2018 04:09 PM    Protein, total 6.6 08/10/2018 04:09 PM    Albumin 4.1 08/10/2018 04:09 PM    Globulin 2.5 10/15/2015 02:55 PM    A-G Ratio 1.6 08/10/2018 04:09 PM    ALT (SGPT) 13 08/10/2018 04:09 PM     Objective:     Vitals:    08/13/18 1315   BP: 108/59   Pulse: 62   Resp: 20   Temp: 97.6 °F (36.4 °C)   TempSrc: Oral   SpO2: 97%   Weight: 139 lb 6.4 oz (63.2 kg)   Height: 5' 6\" (1.676 m)     Wt Readings from Last 3 Encounters:   08/13/18 139 lb 6.4 oz (63.2 kg)   08/10/18 139 lb 12.8 oz (63.4 kg)   06/20/18 146 lb 3.2 oz (66.3 kg)     BP Readings from Last 3 Encounters:   08/13/18 108/59   08/10/18 140/62   06/20/18 104/56     Review of Systems   Constitutional: Positive for activity change, appetite change, fatigue and unexpected weight change. Negative for fever. HENT: Negative for congestion, dental problem, hearing loss, postnasal drip, sinus pressure, sneezing, sore throat and trouble swallowing. Eyes: Negative for discharge, redness and visual disturbance. Respiratory: Negative for apnea, cough, chest tightness, shortness of breath and wheezing. Cardiovascular: Negative for chest pain, palpitations and leg swelling. Gastrointestinal: Negative for abdominal distention, abdominal pain, blood in stool, constipation, diarrhea, nausea and vomiting. Endocrine: Positive for polydipsia and polyuria. Genitourinary: Positive for difficulty urinating, dysuria, frequency and urgency. Negative for flank pain.    Musculoskeletal: Positive for arthralgias, back pain, gait problem, joint swelling and myalgias. Negative for neck pain. Skin: Positive for color change and pallor. Negative for rash and wound. Allergic/Immunologic: Negative for environmental allergies and food allergies. Neurological: Positive for weakness. Negative for dizziness, tremors, light-headedness, numbness and headaches. Hematological: Negative for adenopathy. Bruises/bleeds easily. Psychiatric/Behavioral: Positive for confusion and sleep disturbance. Negative for agitation. The patient is not nervous/anxious. Physical Exam   Constitutional: Vital signs are normal. He appears malnourished and dehydrated. He appears to not be writhing in pain. He appears unhealthy. He appears toxic. He has a sickly appearance. No distress. Thin, frail, fragile in appearing, elderly,  male. Subjective memory changes with signficant flight of ideas and thoughts. HENT:   Head: Normocephalic and atraumatic. Right Ear: Tympanic membrane, external ear and ear canal normal. Decreased hearing is noted. Left Ear: Tympanic membrane, external ear and ear canal normal. Decreased hearing is noted. Nose: Nose normal.   Mouth/Throat: Uvula is midline and oropharynx is clear and moist. Mucous membranes are not pale, dry and not cyanotic. No oral lesions. No uvula swelling. No posterior oropharyngeal edema or posterior oropharyngeal erythema. Tenting of skin with moderate tongue furrowing present    Eyes: Conjunctivae, EOM and lids are normal. Pupils are equal, round, and reactive to light. Lids are everted and swept, no foreign bodies found. Neck: Trachea normal. No JVD present. Spinous process tenderness and muscular tenderness present. Rigidity present. Decreased range of motion present. No thyromegaly present. Cardiovascular: S1 normal, S2 normal, intact distal pulses and normal pulses. An irregularly irregular rhythm present. Bradycardia present. Exam reveals distant heart sounds. Exam reveals no gallop and no friction rub. No murmur heard. No extremity edema is present during today's exam.    Pulmonary/Chest: Effort normal and breath sounds normal.   Abdominal: Soft. Normal appearance and normal aorta. Bowel sounds are tinkling. There is no hepatosplenomegaly. There is no tenderness. There is no rigidity, no guarding and no CVA tenderness. He reports no appetitive as well as increased weight loss. Genitourinary: Rectum normal, testes/scrotum normal and penis normal.   Genitourinary Comments: Frequency, urgency, constant urge to urinate. Pain in lower abdomen that radiates across from the right flank to the left inguinal area when lying down. Musculoskeletal:        Right shoulder: He exhibits decreased range of motion, swelling, effusion, crepitus, deformity, pain, abnormal pulse and decreased strength. Left shoulder: He exhibits decreased range of motion, tenderness, bony tenderness, swelling, crepitus, deformity, pain, spasm and decreased strength. Generalized chronic extremity weakness is present, he is using a cane to ambulate. He is weak in all extremities and has no range of motion in either right or left shoulder. He states this is chronic. Lymphadenopathy:        Head (right side): No submandibular, no tonsillar and no posterior auricular adenopathy present. Head (left side): No submandibular, no tonsillar and no posterior auricular adenopathy present. He has no cervical adenopathy. Neurological: He is alert. He has normal reflexes and intact cranial nerves. He is disoriented. He displays weakness, atrophy, abnormal stance and abnormal speech. A sensory deficit is present. He exhibits abnormal muscle tone. Coordination and gait abnormal. GCS score is 15. Skin: Skin is warm, dry and intact. No bruising and no rash noted. He is not diaphoretic. No cyanosis. There is pallor. Nails show clubbing. His skin is gray in color.  He is malnourished in appearance. His hydration has improved slightly over the weekend and currently he is more up beat then Friday. His daughter and wife are here today and state he is having a harder time with medication administration problem solving. Psychiatric: Mood and memory normal. His affect is blunt. He expresses impulsivity. He exhibits disordered thought content. Baseline mood and affect unchanged from normal.       Disclaimer:   Mr. Sandoval Christianson has been advised to call or return to our office if symptoms worsen/change/persist. We as a care team including the patient; discussed expected course/resolution/complications of diagnosis in detail today. Medication risks/benefits/costs/interactions/alternatives discussed Daniel Craft was given an after visit summary which includes diagnoses, current medications, & vitals. Daniel Craft expressed understanding with the diagnosis and plan.

## 2018-08-13 NOTE — PROGRESS NOTES
ADVISED PATIENT OF THE FOLLOWING HEALTH MAINTAINCE DUE  Health Maintenance Due   Topic Date Due    GLAUCOMA SCREENING Q2Y  01/10/1993    DTaP/Tdap/Td series (2 - Td) 01/01/2016      Chief Complaint   Patient presents with    Urgency     Patient here for follow up, he reports some inprovement after starting the medication over the week end.         1. Have you been to the ER, urgent care clinic since your last visit? Hospitalized since your last visit? No    2. Have you seen or consulted any other health care providers outside of the [address] [place] [address] since your last visit? Include any DEXA scan, mammography  or colon screening. No    3. Do you have an Advance Directive on file? no    4. Do you have a DNR on file? Full        Patient is accompanied by self, daughter and wife I have received verbal consent from Antonio Darden to discuss any/all medical information while they are present in the room.     Advance Care Planning 8/13/2018   Patient's Healthcare Decision Maker is: Legal Next of Kin   Primary Decision Maker Name Wyonia Ahumada   Primary Decision Maker Phone Number [phone]   Primary Decision Maker Relationship to Patient -   Confirm Advance Directive -   Patient Would Like to Complete Advance Directive -       [facility] [employer] [address] [place]os Ortega [address] [address]  Phone: [phone] Fax: [phone]

## 2018-08-13 NOTE — LETTER
8/13/2018 5:43 PM 
 
Patient:  Chuy Bear YOB: 1928 Date of Visit: 8/13/2018 Dear Brett Odonnell MD 
[place] [address] [address] [facility] [address] [id number] VIA Facsimile: [phone] Zarina Flores MD 
[address] 
[facility] [id number] VIA In Basket 
 : 
 
 
I am referring my patient to you for evaluation of Mr. Terri Smith. Please see his pertinent patient information below. Below are the relevant portions of my assessment and plan of care. If you have questions, please do not hesitate to call me. I look forward to following Mr. Craft along with you.  
 
 
 
Sincerely, 
 
 
Michelle Grijalva NP

## 2018-08-13 NOTE — PROGRESS NOTES
Reason for Visit/HPI:    Mahogany Kelley is a 80 y.o. male patient who presents today for   Chief Complaint   Patient presents with    Follow-up     Patient here for follow up     Abdominal Pain     Patient reports having abdominalpain at night. He gets up every hour at night use use the bathroom. Diagnosis/Treatment Plan:    Diagnoses and all orders for this visit:    1. Malignant neoplasm of posterior wall of urinary bladder (HCC) ; extensive PE was completed. Mr. Emily Glover daughter Natalee Batista telephoned and requested her father be seen as she is worried while on vacation with them last week her father was having to use his left arm to physically move his right arm, hand, and elbow while attempting to eat. He also complained about significant pain in the joint. He continues to loose weight and Natalee Batista explained that the last visit with Dr. Julio César Edwards the cancer had returned but Dr. Julio César Edwards recommended that they stop with the cystoscopies and continues to attempt to resect the lesions as this is going to continue to spread and there is little to no benefit to continue to put him through the procedures. Today Mr. Craft is mildly confused, weak and unhealthly in appearance. He has lost another 7 # since his visit in June 2018 with me. He states he has no appetite as well as he is not sleeping as he has been getting up every hour to urinate and have a BM as that is what he feels like he needs to do. I am concerned as he still has a UTI as of the preliminary testing on his urine with a lot of blood present. He is mildly dehydrated. He is not hypotensive but is weak . Discussed the finding of his in office testing with his daughter Natalee Batista over the phone. I am not wanting to send him to the ER for evaluation as I believe what he has is chronic in nature and probably relates to some metastases as well as nutritional deficiencies that are lingering related to the bladder cancer.  His daughter agrees that we can push oral fluids over the weekend and she will watch him for signs of needing emergency care. I will order and perform some stat labs and get an xray of his abdomen as if there is a bowel obstruction I will be able to call and send them to the ER. If they do not hear from me then they will be back in the office debi Monday 8/13 for follow up and possible IVF with possible placement of a hanson for help with his insominia related to constant need to urinate. We will also order home helath if needed to help manage the hanson but I determine [facility]. -     LACTIC ACID  -     CBC WITH AUTOMATED DIFF  -     METABOLIC PANEL, COMPREHENSIVE  -     LDH  -     URINE CYTOLOGY  -     COLLECTION VENOUS BLOOD,VENIPUNCTURE  -     AMMONIA  -     PREALBUMIN  -     MAGNESIUM  -     AMYLASE  -     LIPASE  -     UA/M W/RFLX CULTURE, ROUTINE  -     XR ABD (KUB); Future  -     CT CHEST ABD PELV W WO CONT; Future  -     PET/CT TUMOR IMAGE WH BDY W (INI); Future    2. Acute cystitis with hematuria  -     XR ABD (KUB); Future  -     CT CHEST ABD PELV W WO CONT; Future  -     PET/CT TUMOR IMAGE WH BDY W (INI); Future  -     AMB POC GLUCOSE BLOOD, BY GLUCOSE MONITORING DEVICE  -     trimethoprim-sulfamethoxazole (BACTRIM DS, SEPTRA DS) 160-800 mg per tablet; Take 1 Tab by mouth two (2) times a day for 10 days. -     phenazopyridine (PYRIDIUM) 100 mg tablet; Take 1 Tab by mouth three (3) times daily as needed for up to 3 days. 3. Increased urinary frequency  -     XR ABD (KUB); Future  -     AMB POC GLUCOSE BLOOD, BY GLUCOSE MONITORING DEVICE  -     trimethoprim-sulfamethoxazole (BACTRIM DS, SEPTRA DS) 160-800 mg per tablet; Take 1 Tab by mouth two (2) times a day for 10 days. -     phenazopyridine (PYRIDIUM) 100 mg tablet; Take 1 Tab by mouth three (3) times daily as needed for up to 3 days. 4. Bladder spasms  -     XR ABD (KUB); Future  -     trimethoprim-sulfamethoxazole (BACTRIM DS, SEPTRA DS) 160-800 mg per tablet;  Take 1 Tab by mouth two (2) times a day for 10 days. -     phenazopyridine (PYRIDIUM) 100 mg tablet; Take 1 Tab by mouth three (3) times daily as needed for up to 3 days. 5. Nocturia  -     XR ABD (KUB); Future  -     trimethoprim-sulfamethoxazole (BACTRIM DS, SEPTRA DS) 160-800 mg per tablet; Take 1 Tab by mouth two (2) times a day for 10 days. -     phenazopyridine (PYRIDIUM) 100 mg tablet; Take 1 Tab by mouth three (3) times daily as needed for up to 3 days. 6. Right lower quadrant abdominal pain  -     XR ABD (KUB); Future  -     CT CHEST ABD PELV W WO CONT; Future  -     PET/CT TUMOR IMAGE WH BDY W (INI); Future  -     trimethoprim-sulfamethoxazole (BACTRIM DS, SEPTRA DS) 160-800 mg per tablet; Take 1 Tab by mouth two (2) times a day for 10 days. -     phenazopyridine (PYRIDIUM) 100 mg tablet; Take 1 Tab by mouth three (3) times daily as needed for up to 3 days. 7. Recent change in frequency of bowel movements  -     XR ABD (KUB); Future  -     CT CHEST ABD PELV W WO CONT; Future  -     PET/CT TUMOR IMAGE WH BDY W (INI); Future    8. Excessive weight loss  -     XR ABD (KUB); Future  -     CT CHEST ABD PELV W WO CONT; Future  -     PET/CT TUMOR IMAGE WH BDY W (INI); Future    9. Shoulder joint stiffness, bilateral  -     XR ABD (KUB); Future  -     XR SHOULDER RT AP/LAT MIN 2 V; Future  -     XR SHOULDER LT AP/LAT MIN 2 V; Future    10. Frailty syndrome in geriatric patient  -     XR ABD (KUB); Future  -     AMB POC GLUCOSE BLOOD, BY GLUCOSE MONITORING DEVICE  -     NURSE TO OBTAIN    11. Loss of appetite  -     XR ABD (KUB); Future  -     CT CHEST ABD PELV W WO CONT; Future  -     PET/CT TUMOR IMAGE WH BDY W (INI); Future  -     AMB POC GLUCOSE BLOOD, BY GLUCOSE MONITORING DEVICE    12. Dehydration, mild  -     XR ABD (KUB); Future  -     AMB POC GLUCOSE BLOOD, BY GLUCOSE MONITORING DEVICE  -     NURSE TO OBTAIN    13. Adult failure to thrive  -     XR ABD (KUB);  Future  -     CT CHEST ABD PELV W WO CONT; Future  -     PET/CT TUMOR IMAGE [facility] BDY W (INI); Future  -     AMB POC GLUCOSE BLOOD, BY GLUCOSE MONITORING DEVICE  -     NURSE TO OBTAIN    Other orders  -     AMB POC URINALYSIS DIP STICK MANUAL W/O MICRO        Discontinued Care: The following treatment modalities have been discontinued by the provider today:   There are no discontinued medications. Follow Up: Follow-up Disposition:  Return in about 3 days (around 8/13/2018) for with the NP for follow up to your treatment plan. Subjective: Allergies   Allergen Reactions    Ace Inhibitors Hives    Lisinopril Hives     HIVES, ORAL SWELLING     Prior to Admission medications    Medication Sig Start Date End Date Taking? Authorizing Provider   trimethoprim-sulfamethoxazole (BACTRIM DS, SEPTRA DS) 160-800 mg per tablet Take 1 Tab by mouth two (2) times a day for 10 days. 8/10/18 8/20/18 Yes Lila Ramirez NP   phenazopyridine (PYRIDIUM) 100 mg tablet Take 1 Tab by mouth three (3) times daily as needed for up to 3 days. 8/10/18 8/13/18 Yes Lila Ramirez NP   Saccharomyces boulardii (FLORASTOR) 250 mg capsule Take 1 Cap by mouth daily. 6/11/18  Yes Lila Ramirez NP   atorvastatin (LIPITOR) 40 mg tablet Take 40 mg by mouth every evening. Yes Historical Provider   aspirin delayed-release 81 mg tablet Take 81 mg by mouth daily. Yes Historical Provider   dutaseride (AVODART) 0.5 mg capsule Take 0.5 mg by mouth daily. Yes Historical Provider   omeprazole (PRILOSEC) 20 mg capsule Take 20 mg by mouth every other day. Historical Provider   simvastatin (ZOCOR) 80 mg tablet Take 80 mg by mouth daily. Historical Provider   darifenacin (ENABLEX) 15 mg ER tablet Take 15 mg by mouth daily. Historical Provider   diclofenac (VOLTAREN) 1 % gel Apply 2 g to affected area four (4) times daily.  6/20/18   Elaina Mane NP   acetaminophen-codeine (TYLENOL-CODEINE #3) 300-30 mg per tablet Take 2 Tabs by mouth every four (4) hours as needed for Pain. Max Daily Amount: 12 Tabs. 12/7/15   Jewell Le III, MD   carvedilol (COREG) 3.125 mg tablet Take  by mouth two (2) times daily (with meals). Historical Provider   loratadine (CLARITIN) 10 mg tablet Take 10 mg by mouth daily as needed for Allergies. Historical Provider   losartan (COZAAR) 100 mg tablet Take 100 mg by mouth daily. Historical Provider   doxazosin (CARDURA) 8 mg tablet Take 4 mg by mouth daily. AT BEDTIME    Historical Provider     Past Medical History:   Diagnosis Date    Arthritis     Atherosclerotic heart disease of native coronary artery without angina pectoris 11/10/2017    Benign prostatic hyperplasia without lower urinary tract symptoms 11/10/2017    CAD (coronary artery disease)     HX CABG    Cancer ([facility] [address]     BLADDER    Carcinoma of lateral wall of urinary bladder ([facility] [address]     Chronic pain     Essential (primary) hypertension 11/10/2017    Essential hypertension 6/8/2018    GERD (gastroesophageal reflux disease)     History of colonoscopy 05/22/2014    Hypercholesterolemia     Hyperlipemia 11/10/2017    Microscopic hematuria     Osteoarthritis of left knee 2/1/2011    Other ill-defined conditions(799.89)     BPH     Past Surgical History:   Procedure Laterality Date    CARDIAC SURG PROCEDURE UNLIST  1999    CABG    HX CATARACT REMOVAL Bilateral 07/19/2016    HX COLONOSCOPY      HX GI      ESOPH DIL.     HX HEENT      TONSILLECTOMY    HX ORTHOPAEDIC  2008    R KNEE REPLACE    HX ORTHOPAEDIC  2004    ARTHROSCOPY R KNEE    HX ORTHOPAEDIC  2002    ARTHROSCOPY L KNEE    HX OTHER SURGICAL      ENDOSCOPY-X2    HX PROSTATECTOMY      HX TURP  11/02/2015    HX UROLOGICAL  11/02/2015    Transurethral resection of bladder tumor    TOTAL KNEE ARTHROPLASTY  2011    LEFT      Social History   Substance Use Topics    Smoking status: Never Smoker    Smokeless tobacco: Never Used    Alcohol use 2.0 oz/week     4 Glasses of wine per week      Comment: 4-5 OZ WINE DAILY      Family History   Problem Relation Age of Onset    Heart Disease Father     Cancer Mother    Navya Reddy Problems Neg Hx      Advance Care Planning 8/10/2018   Patient's Healthcare Decision Maker is: Legal Next of Dary 69   Primary Decision Maker Name Jeromy Antonio   Primary Decision Maker Phone Number [phone]   Primary Decision Maker Relationship to Patient Spouse   Confirm Advance Directive None   Patient Would Like to Complete Advance Directive No     Latest Laboratory Results:     Lab Results   Component Value Date/Time    WBC 5.7 06/08/2018 02:33 PM    HGB 13.2 06/08/2018 02:33 PM    HCT 40.4 06/08/2018 02:33 PM    PLATELET 994 23/06/8617 02:33 PM    MCV 91 06/08/2018 02:33 PM     Lab Results   Component Value Date/Time    Sodium 135 06/08/2018 02:33 PM    Potassium 4.4 06/08/2018 02:33 PM    Chloride 98 06/08/2018 02:33 PM    CO2 23 06/08/2018 02:33 PM    Anion gap 8 10/15/2015 02:55 PM    Glucose 192 (H) 06/08/2018 02:33 PM    BUN 21 06/08/2018 02:33 PM    Creatinine 0.92 06/08/2018 02:33 PM    BUN/Creatinine ratio 23 06/08/2018 02:33 PM    GFR est AA 84 06/08/2018 02:33 PM    GFR est non-AA 73 06/08/2018 02:33 PM    Calcium 9.0 06/08/2018 02:33 PM    Bilirubin, total 0.9 06/08/2018 02:33 PM    AST (SGOT) 24 06/08/2018 02:33 PM    Alk.  phosphatase 106 06/08/2018 02:33 PM    Protein, total 6.0 06/08/2018 02:33 PM    Albumin 3.8 06/08/2018 02:33 PM    Globulin 2.5 10/15/2015 02:55 PM    A-G Ratio 1.7 06/08/2018 02:33 PM    ALT (SGPT) 26 06/08/2018 02:33 PM     Objective:     Vitals:    08/10/18 1500   BP: 140/62   Pulse: 78   Resp: 18   Temp: 98.6 °F (37 °C)   TempSrc: Oral   SpO2: 98%   Weight: 139 lb 12.8 oz (63.4 kg)   Height: 5' 6\" (1.676 m)     Wt Readings from Last 3 Encounters:   08/10/18 139 lb 12.8 oz (63.4 kg)   06/20/18 146 lb 3.2 oz (66.3 kg)   06/11/18 146 lb (66.2 kg)     BP Readings from Last 3 Encounters:   08/10/18 140/62   06/20/18 104/56   06/11/18 130/78     Review of Systems   Constitutional: Positive for activity change, appetite change, fatigue and unexpected weight change. Negative for fever. HENT: Negative for congestion, dental problem, hearing loss, postnasal drip, sinus pressure, sneezing, sore throat and trouble swallowing. Eyes: Negative for discharge, redness and visual disturbance. Respiratory: Negative for apnea, cough, chest tightness, shortness of breath and wheezing. Cardiovascular: Negative for chest pain, palpitations and leg swelling. Gastrointestinal: Negative for abdominal distention, abdominal pain, blood in stool, constipation, diarrhea, nausea and vomiting. Endocrine: Positive for polydipsia and polyuria. Genitourinary: Positive for difficulty urinating, dysuria, frequency and urgency. Negative for flank pain. Musculoskeletal: Positive for arthralgias, back pain, gait problem, joint swelling and myalgias. Negative for neck pain. Skin: Positive for color change and pallor. Negative for rash and wound. Allergic/Immunologic: Negative for environmental allergies and food allergies. Neurological: Positive for weakness. Negative for dizziness, tremors, light-headedness, numbness and headaches. Hematological: Negative for adenopathy. Bruises/bleeds easily. Psychiatric/Behavioral: Positive for confusion and sleep disturbance. Negative for agitation. The patient is not nervous/anxious. Physical Exam   Constitutional: Vital signs are normal. He appears malnourished and dehydrated. He appears to not be writhing in pain. He appears unhealthy. He appears toxic. He has a sickly appearance. He appears distressed. Thin, frail, fragile in appearing, elderly,  male. Subjective memory changes with signficant flight of ideas and thoughts. HENT:   Head: Normocephalic and atraumatic. Right Ear: Tympanic membrane, external ear and ear canal normal. Decreased hearing is noted.    Left Ear: Tympanic membrane, external ear and ear canal normal. Decreased hearing is noted. Nose: Nose normal.   Mouth/Throat: Uvula is midline and oropharynx is clear and moist. Mucous membranes are not pale, dry and not cyanotic. No oral lesions. No uvula swelling. No posterior oropharyngeal edema or posterior oropharyngeal erythema. Tenting of skin with moderate tongue furrowing present    Eyes: Conjunctivae, EOM and lids are normal. Pupils are equal, round, and reactive to light. Lids are everted and swept, no foreign bodies found. Neck: Trachea normal. JVD present. Spinous process tenderness and muscular tenderness present. Rigidity present. Decreased range of motion present. No thyromegaly present. Cardiovascular: S1 normal, S2 normal, intact distal pulses and normal pulses. An irregularly irregular rhythm present. Bradycardia present. Exam reveals distant heart sounds. Exam reveals no gallop and no friction rub. No murmur heard. No extremity edema is present during today's exam.    Pulmonary/Chest: Effort normal and breath sounds normal.   Abdominal: Soft. Normal appearance. Bowel sounds are hypoactive and tinkling. There is no hepatosplenomegaly. There is no tenderness. There is no rigidity, no guarding and no CVA tenderness. Genitourinary: Rectum normal, testes/scrotum normal and penis normal.   Genitourinary Comments: Frequency, urgency, constant urge to urinate. Pain in lower abdomen that radiates across from the right flank to the left inguinal area when lying down. Musculoskeletal:        Right shoulder: He exhibits decreased range of motion, swelling, effusion, crepitus, deformity, pain, abnormal pulse and decreased strength. Left shoulder: He exhibits decreased range of motion, tenderness, bony tenderness, swelling, crepitus, deformity, pain, spasm and decreased strength. Generalized chronic extremity weakness is present, he is using a cane to ambulate.  He is weak in all extremities and has no range of motion in either right or left shoulder. He states this is chronic. Lymphadenopathy:        Head (right side): No submandibular, no tonsillar and no posterior auricular adenopathy present. Head (left side): No submandibular, no tonsillar and no posterior auricular adenopathy present. He has no cervical adenopathy. Neurological: He is alert. He has normal reflexes and intact cranial nerves. He is disoriented. He displays weakness, atrophy, abnormal stance and abnormal speech. A sensory deficit is present. He exhibits abnormal muscle tone. Coordination and gait abnormal. GCS score is 15. Skin: Skin is warm, dry and intact. No bruising and no rash noted. He is not diaphoretic. No cyanosis. There is pallor. Nails show clubbing. His skin is gray in color. He is malnourished, weak, mildly disoriented. Psychiatric: Memory normal. His mood appears anxious. His affect is blunt. He expresses impulsivity. He exhibits disordered thought content. Baseline mood and affect unchanged from normal.           Disclaimer:   Mr. Amberly Gleason has been advised to call or return to our office if symptoms worsen/change/persist. We as a care team including the patient; discussed expected course/resolution/complications of diagnosis in detail today. Medication risks/benefits/costs/interactions/alternatives discussed Daniel REBECCAZeenat Craft was given an after visit summary which includes diagnoses, current medications, & vitals. Daniel Craft expressed understanding with the diagnosis and plan.

## 2018-08-14 ENCOUNTER — TELEPHONE (OUTPATIENT)
Dept: GERIATRIC MEDICINE | Age: 83
End: 2018-08-14

## 2018-08-15 DIAGNOSIS — R10.32 BILATERAL LOWER ABDOMINAL PAIN: ICD-10-CM

## 2018-08-15 DIAGNOSIS — R35.0 INCREASED URINARY FREQUENCY: ICD-10-CM

## 2018-08-15 DIAGNOSIS — N30.01 ACUTE CYSTITIS WITH HEMATURIA: ICD-10-CM

## 2018-08-15 DIAGNOSIS — R19.4 CHANGE IN BOWEL HABITS: ICD-10-CM

## 2018-08-15 DIAGNOSIS — R63.4 LOSS OF WEIGHT: ICD-10-CM

## 2018-08-15 DIAGNOSIS — R62.7 ADULT FAILURE TO THRIVE: ICD-10-CM

## 2018-08-15 DIAGNOSIS — R10.31 BILATERAL LOWER ABDOMINAL PAIN: ICD-10-CM

## 2018-08-15 DIAGNOSIS — N32.89 BLADDER SPASMS: Primary | ICD-10-CM

## 2018-08-15 DIAGNOSIS — R63.0 APPETITE LOST: ICD-10-CM

## 2018-08-15 DIAGNOSIS — C67.4 MALIGNANT NEOPLASM OF POSTERIOR WALL OF URINARY BLADDER (HCC): ICD-10-CM

## 2018-08-15 NOTE — PROGRESS NOTES
Mild dehydration. UTI is being treated as it is quality of life. Gave fluids and he is feeling much better.

## 2018-08-16 DIAGNOSIS — R35.0 INCREASED URINARY FREQUENCY: ICD-10-CM

## 2018-08-16 DIAGNOSIS — R35.1 NOCTURIA: ICD-10-CM

## 2018-08-16 DIAGNOSIS — M25.611 SHOULDER JOINT STIFFNESS, BILATERAL: ICD-10-CM

## 2018-08-16 DIAGNOSIS — R63.0 LOSS OF APPETITE: ICD-10-CM

## 2018-08-16 DIAGNOSIS — R19.4 RECENT CHANGE IN FREQUENCY OF BOWEL MOVEMENTS: ICD-10-CM

## 2018-08-16 DIAGNOSIS — E86.0 DEHYDRATION, MILD: ICD-10-CM

## 2018-08-16 DIAGNOSIS — C67.4 MALIGNANT NEOPLASM OF POSTERIOR WALL OF URINARY BLADDER (HCC): ICD-10-CM

## 2018-08-16 DIAGNOSIS — R63.4 EXCESSIVE WEIGHT LOSS: ICD-10-CM

## 2018-08-16 DIAGNOSIS — R10.31 RIGHT LOWER QUADRANT ABDOMINAL PAIN: ICD-10-CM

## 2018-08-16 DIAGNOSIS — N32.89 BLADDER SPASMS: ICD-10-CM

## 2018-08-16 DIAGNOSIS — R62.7 ADULT FAILURE TO THRIVE: ICD-10-CM

## 2018-08-16 DIAGNOSIS — M25.612 SHOULDER JOINT STIFFNESS, BILATERAL: ICD-10-CM

## 2018-08-16 DIAGNOSIS — R54 FRAILTY SYNDROME IN GERIATRIC PATIENT: ICD-10-CM

## 2018-08-16 DIAGNOSIS — N30.01 ACUTE CYSTITIS WITH HEMATURIA: ICD-10-CM

## 2018-08-20 ENCOUNTER — APPOINTMENT (OUTPATIENT)
Dept: CT IMAGING | Age: 83
End: 2018-08-20
Attending: NURSE PRACTITIONER
Payer: MEDICARE

## 2018-08-20 ENCOUNTER — HOSPITAL ENCOUNTER (OUTPATIENT)
Dept: GENERAL RADIOLOGY | Age: 83
Discharge: HOME | End: 2018-08-20
Attending: NURSE PRACTITIONER
Payer: MEDICARE

## 2018-08-20 ENCOUNTER — HOSPITAL ENCOUNTER (EMERGENCY)
Age: 83
Discharge: STILL ADMITTED OR ADMINISTRATIVE | End: 2018-08-20
Attending: EMERGENCY MEDICINE
Payer: MEDICARE

## 2018-08-20 ENCOUNTER — HOSPITAL ENCOUNTER (OUTPATIENT)
Dept: PET IMAGING | Age: 83
Discharge: HOME | End: 2018-08-20
Attending: NURSE PRACTITIONER
Payer: MEDICARE

## 2018-08-20 ENCOUNTER — HOSPITAL ENCOUNTER (OUTPATIENT)
Dept: CT IMAGING | Age: 83
Discharge: HOME | End: 2018-08-20
Attending: NURSE PRACTITIONER
Payer: MEDICARE

## 2018-08-20 VITALS — HEIGHT: 68 IN | WEIGHT: 140 LBS | BODY MASS INDEX: 21.22 KG/M2

## 2018-08-20 DIAGNOSIS — R63.0 APPETITE LOST: ICD-10-CM

## 2018-08-20 DIAGNOSIS — M25.612 SHOULDER JOINT STIFFNESS, BILATERAL: ICD-10-CM

## 2018-08-20 DIAGNOSIS — R10.31 BILATERAL LOWER ABDOMINAL PAIN: ICD-10-CM

## 2018-08-20 DIAGNOSIS — R35.0 INCREASED URINARY FREQUENCY: ICD-10-CM

## 2018-08-20 DIAGNOSIS — R62.7 ADULT FAILURE TO THRIVE: ICD-10-CM

## 2018-08-20 DIAGNOSIS — C67.4 MALIGNANT NEOPLASM OF POSTERIOR WALL OF URINARY BLADDER (HCC): ICD-10-CM

## 2018-08-20 DIAGNOSIS — R10.32 BILATERAL LOWER ABDOMINAL PAIN: ICD-10-CM

## 2018-08-20 DIAGNOSIS — R63.4 LOSS OF WEIGHT: ICD-10-CM

## 2018-08-20 DIAGNOSIS — R10.31 RIGHT LOWER QUADRANT ABDOMINAL PAIN: ICD-10-CM

## 2018-08-20 DIAGNOSIS — N30.01 ACUTE CYSTITIS WITH HEMATURIA: ICD-10-CM

## 2018-08-20 DIAGNOSIS — R63.4 EXCESSIVE WEIGHT LOSS: ICD-10-CM

## 2018-08-20 DIAGNOSIS — N32.89 BLADDER SPASMS: ICD-10-CM

## 2018-08-20 DIAGNOSIS — R06.02 SOB (SHORTNESS OF BREATH): Primary | ICD-10-CM

## 2018-08-20 DIAGNOSIS — R19.4 RECENT CHANGE IN FREQUENCY OF BOWEL MOVEMENTS: ICD-10-CM

## 2018-08-20 DIAGNOSIS — R06.02 SOB (SHORTNESS OF BREATH): ICD-10-CM

## 2018-08-20 DIAGNOSIS — R19.4 CHANGE IN BOWEL HABITS: ICD-10-CM

## 2018-08-20 DIAGNOSIS — R63.0 LOSS OF APPETITE: ICD-10-CM

## 2018-08-20 DIAGNOSIS — M25.611 SHOULDER JOINT STIFFNESS, BILATERAL: ICD-10-CM

## 2018-08-20 PROCEDURE — 73030 X-RAY EXAM OF SHOULDER: CPT

## 2018-08-20 PROCEDURE — 74177 CT ABD & PELVIS W/CONTRAST: CPT

## 2018-08-20 PROCEDURE — A9552 F18 FDG: HCPCS

## 2018-08-20 PROCEDURE — 75810000275 HC EMERGENCY DEPT VISIT NO LEVEL OF CARE

## 2018-08-20 PROCEDURE — 74011000258 HC RX REV CODE- 258: Performed by: NURSE PRACTITIONER

## 2018-08-20 PROCEDURE — 71260 CT THORAX DX C+: CPT

## 2018-08-20 PROCEDURE — 74011636320 HC RX REV CODE- 636/320: Performed by: NURSE PRACTITIONER

## 2018-08-20 RX ORDER — SODIUM CHLORIDE 0.9 % (FLUSH) 0.9 %
10 SYRINGE (ML) INJECTION
Status: COMPLETED | OUTPATIENT
Start: 2018-08-20 | End: 2018-08-20

## 2018-08-20 RX ADMIN — IOPAMIDOL 100 ML: 755 INJECTION, SOLUTION INTRAVENOUS at 17:44

## 2018-08-20 RX ADMIN — SODIUM CHLORIDE 100 ML: 900 INJECTION, SOLUTION INTRAVENOUS at 17:44

## 2018-08-20 RX ADMIN — Medication 10 ML: at 13:45

## 2018-08-20 RX ADMIN — IOHEXOL 50 ML: 240 INJECTION, SOLUTION INTRATHECAL; INTRAVASCULAR; INTRAVENOUS; ORAL at 17:44

## 2018-08-20 RX ADMIN — Medication 10 ML: at 17:44

## 2018-08-27 NOTE — PROGRESS NOTES
IMPRESSION:  Three views of the left shoulder demonstrate no fracture, dislocation  or other acute abnormality. Osteopenia is age-appropriate. Acromioclavicular  joint osteoarthritis is unchanged and age-appropriate. Glenohumeral joint  osteoarthritis is at least moderate and better imaged. Inferior glenohumeral  capsular calcification may be new. Soft tissue calcifications posterior to the  proximal humerus diaphysis are new versus better imaged. 1. No fracture. 2. Osteoarthritis. 3. Soft tissue calcifications indicate old injury. I have recommended PT/OT as well as presumptive orthopedic exam if desires.

## 2018-08-27 NOTE — PROGRESS NOTES
FINDINGS:     THYROID: Homogeneous    LUNGS: There is no acute finding. Small calcified granuloma noted in the lingula. There is a peripheral parenchymal scarring in the left lower lobe laterally. No evidence of parenchymal nodule is identified. Slight parenchymal pleural scarring is seen in the region of the azygos esophageal recess. PLEURA: No effusion or pneumothorax. TRACHEA/BRONCHI: Patent. MEDIASTINUM/DALTON: No mass or lymphadenopathy. THORACIC AORTA: No dissection or aneurysm. BONES: Degenerative changes of the spine noted. Previous sternotomy noted. There are no lytic or sclerotic lesions of the skeletal structures of the thorax, abdomen, or pelvis. Degenerative joint disease is seen in the hips. Abdomen/Pelvis     LIVER: No mass or biliary dilatation. GALLBLADDER: Unremarkable. SPLEEN: No mass. PANCREAS: There is a 4 mm lucency in the tail of the pancreas which may be an incidental tiny pancreatic cyst or IPMN. Pancreas is otherwise unremarkable. ADRENALS: Unremarkable. KIDNEYS: No mass, calculus, or hydronephrosis. Lucencies noted in the renal hilus on the left are most likely parapelvic cysts. GI: No dilatation or wall thickening. APPENDIX: Not identified    PERITONEUM: No ascites or pneumoperitoneum. RETROPERITONEUM: No lymphadenopathy or aortic aneurysm. URINARY BLADDER: There is markedly distention of the urinary bladder. Bladder wall is irregular in thickness without a particular definable focal mass. PELVIS: Prostate gland demonstrates calcification and mild enlargement. There is an oval lucency in the right inguinal region which is consistent with a lymph node measuring 3.7 x 1.7 cm. BONES: See above                                                RADIOLOGIST IMPRESSION:    THORAX:  No evidence of metastatic disease. ABDOMEN/PELVIS:   1. Marked distention of the urinary bladder.  Possibility of bladder outlet abnormality should be considered. There is also irregular thickening of the entire urinary bladder again consistent with hypertrophic change which may be secondary to bladder outlet obstruction. 2. There is a single lymph node in the right inguinal region which is moderately enlarged. No other evidence of adenopathy or metastatic disease in the abdomen or pelvis.

## 2018-08-27 NOTE — PROGRESS NOTES
IMPRESSION/FINDINGS :     Three views of the right shoulder demonstrate no acute fracture or  dislocation. Osteopenia is age-appropriate. Acromioclavicular joint  osteoarthritis and bulky osteophytes are unchanged. At least mild glenohumeral  joint osteoarthritis is better imaged. Secondary evidence of chronic rotator  cuff tendon pathology is better imaged. Soft tissue calcifications posterior to  the proximal humeral diaphysis are new versus better imaged.        1. No acute fracture. 2. Osteoarthritis and rotator cuff pathology. 3. Soft tissue calcifications most likely represent old soft tissue injury to the proximal, posterior right arm.     Ordered PT/OT as well as recommend/offered Orthopedic consult

## 2018-08-28 DIAGNOSIS — R62.7 ADULT FAILURE TO THRIVE: ICD-10-CM

## 2018-08-28 DIAGNOSIS — R26.0 ATAXIC GAIT: ICD-10-CM

## 2018-08-28 DIAGNOSIS — M62.81 MUSCLE WEAKNESS OF LOWER EXTREMITY: ICD-10-CM

## 2018-08-28 DIAGNOSIS — M25.612 SHOULDER JOINT STIFFNESS, BILATERAL: Primary | ICD-10-CM

## 2018-08-28 DIAGNOSIS — M25.611 SHOULDER JOINT STIFFNESS, BILATERAL: Primary | ICD-10-CM

## 2018-08-28 DIAGNOSIS — M62.81 MUSCLE WEAKNESS OF UPPER EXTREMITY: ICD-10-CM

## 2018-08-28 NOTE — PROGRESS NOTES
I received orders from Gina Avendano NP to move forward with orders to refer patient to  PT and OT therapies. Anoop Thomas has spoken with patients daughters and they are in agreeance with moving forward. I have completed the orders and will send patients information and orders over to therapy. They will contact patient for evaluation and treatment.

## 2018-09-04 ENCOUNTER — OFFICE VISIT (OUTPATIENT)
Dept: GERIATRIC MEDICINE | Age: 83
End: 2018-09-04

## 2018-09-04 VITALS
OXYGEN SATURATION: 99 % | TEMPERATURE: 98.4 F | DIASTOLIC BLOOD PRESSURE: 66 MMHG | BODY MASS INDEX: 20.82 KG/M2 | RESPIRATION RATE: 18 BRPM | WEIGHT: 137.4 LBS | HEIGHT: 68 IN | SYSTOLIC BLOOD PRESSURE: 119 MMHG | HEART RATE: 78 BPM

## 2018-09-04 DIAGNOSIS — R63.4 LOSS OF WEIGHT: ICD-10-CM

## 2018-09-04 DIAGNOSIS — M75.32 BILATERAL CALCIFIC TENDINITIS OF SHOULDERS: ICD-10-CM

## 2018-09-04 DIAGNOSIS — M75.31 BILATERAL CALCIFIC TENDINITIS OF SHOULDERS: ICD-10-CM

## 2018-09-04 DIAGNOSIS — Z71.89 ENCOUNTER FOR FAMILY CONFERENCE WITH PATIENT PRESENT: ICD-10-CM

## 2018-09-04 DIAGNOSIS — Z91.89 AT RISK FOR KNOWLEDGE DEFICIT: ICD-10-CM

## 2018-09-04 DIAGNOSIS — R53.83 OTHER FATIGUE: ICD-10-CM

## 2018-09-04 DIAGNOSIS — K86.9 PANCREATIC LESION: ICD-10-CM

## 2018-09-04 DIAGNOSIS — N99.114 POSTPROCEDURAL MALE URETHRAL STRICTURE: ICD-10-CM

## 2018-09-04 DIAGNOSIS — M19.90 OSTEOARTHRITIS, CHRONIC: ICD-10-CM

## 2018-09-04 DIAGNOSIS — C67.4 MALIGNANT NEOPLASM OF POSTERIOR WALL OF URINARY BLADDER (HCC): Primary | Chronic | ICD-10-CM

## 2018-09-04 DIAGNOSIS — N32.0 BLADDER OUTLET OBSTRUCTION: ICD-10-CM

## 2018-09-04 DIAGNOSIS — Q45.3 PANCREATIC ABNORMALITY: ICD-10-CM

## 2018-09-04 DIAGNOSIS — M15.9 PRIMARY OSTEOARTHRITIS INVOLVING MULTIPLE JOINTS: ICD-10-CM

## 2018-09-04 DIAGNOSIS — R35.0 INCREASED URINARY FREQUENCY: ICD-10-CM

## 2018-09-04 DIAGNOSIS — J45.30 MILD PERSISTENT REACTIVE AIRWAY DISEASE WITHOUT COMPLICATION: ICD-10-CM

## 2018-09-04 DIAGNOSIS — R62.7 ADULT FAILURE TO THRIVE: ICD-10-CM

## 2018-09-04 DIAGNOSIS — N32.89 BLADDER WALL THICKENING: ICD-10-CM

## 2018-09-04 DIAGNOSIS — N40.1 BENIGN PROSTATIC HYPERPLASIA WITH INCOMPLETE BLADDER EMPTYING: ICD-10-CM

## 2018-09-04 DIAGNOSIS — R63.0 APPETITE LOST: ICD-10-CM

## 2018-09-04 DIAGNOSIS — R39.14 BENIGN PROSTATIC HYPERPLASIA WITH INCOMPLETE BLADDER EMPTYING: ICD-10-CM

## 2018-09-04 DIAGNOSIS — R59.0 LYMPHADENOPATHY, INGUINAL: ICD-10-CM

## 2018-09-04 DIAGNOSIS — N31.9 NEUROGENIC BLADDER: ICD-10-CM

## 2018-09-04 NOTE — PROGRESS NOTES
ADVISED PATIENT OF THE FOLLOWING HEALTH MAINTAINCE DUE  Health Maintenance Due   Topic Date Due    GLAUCOMA SCREENING Q2Y  01/10/1993    DTaP/Tdap/Td series (2 - Td) 01/01/2016      Chief Complaint   Patient presents with    Results     Patient here to discuss recent lab, xrays and imaging results with NP.        1. Have you been to the ER, urgent care clinic since your last visit? Hospitalized since your last visit? No    2. Have you seen or consulted any other health care providers outside of the [address] [place] [address] since your last visit? Include any DEXA scan, mammography  or colon screening. No    3. Do you have an Advance Directive on file? no    4. Do you have a DNR on file? Full        Patient is accompanied by self, daughter and wife I have received verbal consent from Antonio Darden to discuss any/all medical information while they are present in the room.       Advance Care Planning 8/13/2018   Patient's Healthcare Decision Maker is: Legal Next of Kin   Primary Decision Maker Name Madison Jurado   Primary Decision Maker Phone Number [phone]   Primary Decision Maker Relationship to Patient -   Confirm Advance Directive -   Patient Would Like to Complete Advance Directive -         [facility] [address] Karlos Strong of [address]  [address] [address]  Phone: [phone] Fax: [phone]

## 2018-09-04 NOTE — LETTER
9/6/2018 2:18 PM 
 
RE:    Ruben Treviño [phone] [place] [id number] Hanson Catheter Supply/Orders Mr. Lily Juárez has the following diagnosis associated with the necessity of an indwelling hanson catheter to manage his chronic retention. 1. 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 Neuromuscular Dysfunction of Bladder - N 31.9 Please accept this documentation as formal orders needed to manage a long term hanson catheter on Mr. Juli Sanchez and the necessary supplies needed for his care. The following are the supplies we will need to manage this hanson long term: These can be any brand as long as they are functional & easy to use! 1. 16 Trinidadian - bacterio-static coude catheter (monthly changes but would like to have one on hand extra for possible emergency change) 2. Hanson cath insertion tray 3. Leg Bag  
4. Bed Bag  
5. Irrigation Tray with 60 cc syringe 6. Stat Kassie (ONLY WANT STAT KASSIE) to change 3 times per week 7. Bard Flip Keith Catheter Valve (ONLY WANT THIS AS HE HAS ISSUES WITH ARTHRITIS & DEXTERITY IN HANDS) Any questions or concerns please contact my office at [phone] and speak with my nurse Gary Joseph. Kristal Camilo, RN, MSN, [facility]- Acute Care/Adult Gerontology Nurse Practitioner [facility] [address] [facility] [place], [address] [place] [address] 
[phone] (office) [phone] (cell) [phone] (office fax)

## 2018-09-05 NOTE — PATIENT INSTRUCTIONS
Anorexia: Care Instructions  Your Care Instructions    Anorexia is a type of eating disorder. People who have anorexia don't eat enough to stay at a normal weight. Sometimes they also exercise too much. They do these things because they're so afraid of gaining weight. They believe that they're fat, even when they're thin. Often they think that losing even more weight will solve their problems and make their lives better. If you have anorexia, it can really harm your health. This is because your body doesn't get the nutrition it needs. The first step to controlling the problem is admitting that something is wrong. Then counseling can help you change how you think about food, the way you see your body, and any other emotional issues. It may take months or years, but you can recover from anorexia. Follow-up care is a key part of your treatment and safety. Be sure to make and go to all appointments, and call your doctor if you are having problems. It's also a good idea to know your test results and keep a list of the medicines you take. How can you care for yourself at home? Follow your treatment plan  · Go to your counseling sessions. Call or talk with your counselor if you can't go or if you think the sessions aren't helping. Don't just stop going. · Take your medicines exactly as prescribed. Call your doctor if you think you are having a problem with your medicine. You will get more details on the specific medicines your doctor prescribes. Trust people who are helping you  · Listen to what counselors and nutrition experts say about healthy eating. · Learn about what is included in a balanced diet. Then discuss what you learn. · Let people know how you are feeling. Listen to how they are feeling too. · Accept support and feedback from other people. Learn to be easier on yourself  · Learn to focus on your good qualities. · If your body feels weak, slow down and do less.   · Remind yourself that feeling bad about yourself is all part of your disorder. · Remember that it takes time to recover from anorexia. · Spend time with other people doing things you enjoy. · Try to focus on one goal at a time. · Don't blame yourself for your disorder. Develop healthy eating habits  · With your doctor and counselor, you will decide on a good weight for you. You will also decide how much weight you will gain each week. · Try not to argue with the people you eat with. Arguing increases stress. And stress can make make it harder for you to relax and eat. · Talk with other people during meals about things that interest you. Don't talk about food or gaining weight. Caring for a loved one with anorexia  · Remind yourself that anorexia is a long-lasting disorder. It takes time for changes to occur. · Show love and support for your loved one, especially if he or she gets discouraged. · Support your loved one as he or she goes through the steps of recovery. Focus on wellness. Don't focus on food. · Take care of yourself. Continue with your own interests, career, hobbies, and friends. · Don't blame yourself or look for the reason for the disorder. · If you are having a hard time, talk with a counselor. · Learn what to do if your loved one relapses. When should you call for help? Call 911 anytime you think you may need emergency care. For example, call if:    · A person with anorexia seems depressed and is talking about suicide. If the talk about suicide seems real, stay with the person, or ask someone you trust to stay with the person, until you get emergency help.     · You have a rapid or irregular heartbeat.     · You passed out (lost consciousness).    Call your doctor now or seek immediate medical care if:    · You feel hopeless or have thoughts of hurting yourself.    Watch closely for changes in your health, and be sure to contact your doctor if:    · You have trouble sleeping.     · You feel anxious or depressed. Where can you learn more? Go to http://thomas-jason.info/. Enter Y976 in the search box to learn more about \"Anorexia: Care Instructions. \"  Current as of: December 7, 2017  Content Version: 11.7  © 5176-8705 [employer]. Care instructions adapted under license by [employer] (which disclaims liability or warranty for this information). If you have questions about a medical condition or this instruction, always ask your healthcare professional. [address] any warranty or liability for your use of this information. Bladder Cancer: Care Instructions  Your Care Instructions    Bladder cancer occurs when abnormal cells grow out of control in the bladder. It usually can be cured when it is found early. It is more common in older people. Treatment may include surgery to remove part of the bladder. If the tumor is large, the entire bladder may be removed. You may also have radiation or chemotherapy to kill the cancer cells. Sometimes people get treatment with medicines that help the body's natural defenses, or immune system, fight the cancer. Finding out that you have cancer is scary. You may feel many emotions and may need some help coping. Seek out family, friends, and counselors for support. You also can do things at home to make yourself feel better while you go through treatment. Call the [employer] (9-837.222.2430) or visit its website at [employer] for more information. Follow-up care is a key part of your treatment and safety. Be sure to make and go to all appointments, and call your doctor if you are having problems. It's also a good idea to know your test results and keep a list of the medicines you take. How can you care for yourself at home? · Take your medicines exactly as prescribed. Call your doctor if you think you are having a problem with your medicine.  You may get medicine for nausea and vomiting if you have these side effects. · Eat healthy food. If you are not hungry, try to eat food that has protein and extra calories to keep up your strength and prevent weight loss. Drink liquid meal replacements for extra calories and protein. Try to eat your main meal early. · Get some physical activity every day, but do not get too tired. Keep doing the hobbies you enjoy as your energy allows. · Take steps to control your stress and workload. Learn relaxation techniques. ¨ Share your feelings. Stress and tension affect our emotions. By expressing your feelings to others, you may be able to understand and cope with them. ¨ Consider joining a support group. Talking about a problem with your spouse, a good friend, or other people with similar problems is a good way to reduce tension and stress. ¨ Express yourself through art. Try writing, dance, art, or crafts to relieve tension. Some dance, writing, or art groups may be available just for people who have cancer. ¨ Be kind to your body and mind. Getting enough sleep, eating a healthy diet, and taking time to do things you enjoy can contribute to an overall feeling of balance in your life and help reduce stress. ¨ Get help if you need it. Discuss your concerns with your doctor or counselor. · If you are vomiting or have diarrhea:  ¨ Drink plenty of fluids (enough so that your urine is light yellow or clear like water) to prevent dehydration. Choose water and other caffeine-free clear liquids. If you have kidney, heart, or liver disease and have to limit fluids, talk with your doctor before you increase the amount of fluids you drink. ¨ When you are able to eat, try clear soups, mild foods, and liquids until all symptoms are gone for 12 to 48 hours.  Other good choices include dry toast, crackers, cooked cereal, and gelatin dessert, such as Jell-O.  · Take care of your urinary tract to prevent problems such as infection, which can be caused by bladder cancer and its treatment. Limit drinks with caffeine, drink plenty of fluids, and urinate every 3 to 4 hours. · If you have not already done so, prepare a list of advance directives. Advance directives are instructions to your doctor and family members about what kind of care you want if you become unable to speak for yourself. When should you call for help? Call your doctor now or seek immediate medical care if:    · You have pain that does not get better after taking pain medicine.     · You have symptoms of a kidney infection. These may include:  ¨ Pain or burning when you urinate. ¨ A frequent need to urinate without being able to pass much urine. ¨ Pain in the flank, which is just below the rib cage and above the waist on either side of the back. ¨ Blood in your urine. ¨ A fever.    Watch closely for changes in your health, and be sure to contact your doctor if:    · You do not get better as expected. Where can you learn more? Go to http://thomasGlycomindsjason.info/. Enter M796 in the search box to learn more about \"Bladder Cancer: Care Instructions. \"  Current as of: May 12, 2017  Content Version: 11.7  © 4550-9552 [employer]. Care instructions adapted under license by [employer] (which disclaims liability or warranty for this information). If you have questions about a medical condition or this instruction, always ask your healthcare professional. [place] [address] any warranty or liability for your use of this information. Urinary Retention: Care Instructions  Your Care Instructions    Urinary retention means that you aren't able to urinate. In men, it is often caused by a blockage of the urinary tract from an enlarged prostate gland. In men and women, it can also be caused by an infection or nerve damage. Or it may be a side effect of a medicine. The doctor may have drained the urine from your bladder.  If you still have problems passing urine, you may need to use a catheter at home. This is used to empty your bladder until the problem can be fixed. Your doctor may put a catheter in your bladder before you go home. If so, he or she will tell you when to come back to have the catheter removed. The doctor has checked you closely. But problems can develop later. If you notice any problems or new symptoms, get medical treatment right away. Follow-up care is a key part of your treatment and safety. Be sure to make and go to all appointments, and call your doctor if you are having problems. It's also a good idea to know your test results and keep a list of the medicines you take. How can you care for yourself at home? · Take your medicines exactly as prescribed. Call your doctor if you think you are having a problem with your medicine. You will get more details on the specific medicines your doctor prescribes. · Check with your doctor before you use any over-the-counter medicines. Many cold and allergy medicines, for example, can make this problem worse. Make sure your doctor knows all of the medicines, vitamins, supplements, and herbal remedies you take. · Spread out through the day the amount of fluid you drink. Do not drink a lot at bedtime. · Avoid alcohol and caffeine. · If you have been given a catheter, or if one is already in place, follow the instructions you were given. Always wash your hands before and after you handle the catheter. When should you call for help? Call your doctor now or seek immediate medical care if:    · You cannot urinate at all, or it is getting harder to urinate.     · You have symptoms of a urinary tract infection. These may include:  ¨ Pain or burning when you urinate. ¨ A frequent need to urinate without being able to pass much urine. ¨ Pain in the flank, which is just below the rib cage and above the waist on either side of the back. ¨ Blood in your urine.   ¨ A fever.    Watch closely for changes in your health, and be sure to contact your doctor if:    · You have any problems with your catheter.     · You do not get better as expected. Where can you learn more? Go to http://thomas-jason.info/. Enter M244 in the search box to learn more about \"Urinary Retention: Care Instructions. \"  Current as of: May 12, 2017  Content Version: 11.7  © 3757-7769 [employer]. Care instructions adapted under license by [employer] (which disclaims liability or warranty for this information). If you have questions about a medical condition or this instruction, always ask your healthcare professional. [place] [address] any warranty or liability for your use of this information.

## 2018-09-06 NOTE — PROGRESS NOTES
Reason for Visit/HPI:    Caleb Lomas is a 80 y.o. male patient who presents today for   Chief Complaint   Patient presents with    Results     Patient here to discuss recent lab, xrays and imaging results with NP. Prolonged Face to Face time discussing treatment options, diagnostic options, laboratory results, plan of care, and trajectory of illness with family and apartment. Start F to F- 1545  End F to F -  1800  Total prolonged care provided face to face - 135 mins total  Diagnosis/Treatment Plan:    Diagnoses and all orders for this visit:    1. Malignant neoplasm of posterior wall of urinary bladder (HCC) ; currently the scans have not given a definitive diagnosis of bladder cancer reoccurance but Dr. Tami Clayton is of the opinion that the probability is high as this would be reoccurrence x 3 of primary bladder wall CA. He has recommended not to preform further cystoscopies, biopsies, or invasive testing related this Mr. Destin Nunez bladder issues. Currently discussion with patient, wife, and daughters has revealed they do not want to pursue it any farther either. I tend to agree with them on this. Following PET Scan, CT's, ect. .. I believe there is a malignancy present but I can not put a label to it currently. With his significant decline in agility, strength, and stamina noted with the 30# weight loss in 6 months and continuing about 3 # a month, my suspicions are leaning towards some sort of metastatic disease. Comments:  Unknown mass or lesion present, urology does not recommend biopsy or cystoscopy further. Orders:  -     [address]; Future    2. Encounter for family conference with patient present ; lengthy discussion today presenting patient, wife and daughter with results from scans, theory of disease, diagnostics, prognosis and planning care. I presented patient and family with educational materials, informative pictures to explain the finding from the scans.  Discussed at length weight loss, failure to thrive, and debility. Discussed at length offering to place a hanson catheter for the bladder outlet obstruction. It required me reorienting Mr. Craft on multiple occassions to what I was talking about. He dozed off frequently in the visit. 3. Loss of weight ; continues to decline in weight, size, and stamina. Recommended that Mr. Craft get Boost or Ensure products with high caloric presence. I have reccommended he drink one - three times daily with his meals and break up his meals into 5 smaller portions if he just feels he is eating too much at one setting. Discussed at length use of appetite stimulant to help with pushing him in the direction of eating. At thsi time he declines such a supplement. He would like to try and accomplish this on his own. I am ordered the dietician re attempt consult with patient and wife to help with food choices and ways to increase his body mass. We will continue to follow this decrease. Wt Readings from Last 3 Encounters:   09/04/18 137 lb 6.4 oz (62.3 kg)   08/20/18 140 lb (63.5 kg)   08/13/18 139 lb 6.4 oz (63.2 kg)     -     REFERRAL TO NUTRITION    4. Appetite lost ; REFERRAL TO NUTRITION    5. Adult failure to thrive  -     [facility] [address]; Future  -     REFERRAL TO NUTRITION    6. Mild persistent reactive airway disease without complication  Comments:  mildly noted on CT scan. Probable relation to former smoker, 2nd hand smoke or polutants. 7. Primary osteoarthritis involving multiple joints    8. Osteoarthritis, chronic    9. Bilateral calcific tendinitis of shoulders ; continues to have difficulty and pain related to his shoulders and arthritis. He is starting PT for these issues. The family has elected to not pursue further diagnostics or invasive treatment. I have offered them a referral back to Orthopedics if they would like as he may be a candidate for steroid injections but currently they have declined.      10. Pancreatic abnormality; new find on CT. Currently after a lengthy discussion of possibilities and further diagnostic approaches as well as a referral offered to GI for a possible EUS to see what this area is, family and patient both declined moving forward on this concern. We will watch his pancreatic values and I have made it clear this could be the cause of his continued rapid decline if this is not a cyst but in deed a tumor. All parties stated understanding to decline the referral at this time. Comments:  4 mm lucentcy noted on CT scan 8/2018 cyst vs IPMN    11. Pancreatic lesion    12. Lymphadenopathy, inguinal ; I palpated this today during our exam. It is present and I am able to hold it between 2 fingers. It is round and hard. It is not painful but is notably attached and not moving. Again all the options were discussed for the possible cause or diagnosis and all parties have declined moving forward with further testing as they state they will not do anything different if it is something concerning. Comments:  Enlarged 4 cm x 2 cm in Right Inguinal region. Hard, attached, and non painful. 13. Bladder outlet obstruction ; results of the scans show narrowing of the urethra from the bladder to the penis. He has some mild BPH but that is to be expected. It also shows significant thickening of the bladder wall and such states probable diagnosis is bladder outlet obstruction. This would explain why Mr. rCaft gets up every 45 min's to an hour to urinate and most of the time does not have anything to expelled. He is consumed with the urinary symptoms as well as some lower abdominal pain. He is not sleeping and becoming weaker by the day. In depth educational tools used to explain the diagnosis as well as options given to help with increasing quality of life. Discussed options of not doing anything, placing an indwelling hanson catheter long term, and possibility of a suprapubic catheter placement.  The biggest concern I and the family have is as Mr. Destin Nunez debility, fragility, and illness are increasing and they are concerned about his ability to care for the catheter. I have advised them that being that he is in independent living and should be able to manage this part of his health. I will order home health to have them perform in home care related to teaching, management and use of his hanson catheter.   -     [address] [address]; Future    14. 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 Bladder wall thickening  -     [facility]; Future    16. Benign prostatic hyperplasia with incomplete bladder emptying  -     [facility]; Future    17. Other fatigue  -     REFERRAL TO NUTRITION    18. Increased urinary frequency    19. At risk for knowledge deficit  -     REFERRAL TO NUTRITION    Discontinued Care: The following treatment modalities have been discontinued by the provider today:   There are no discontinued medications. Follow Up: Follow-up Disposition:  Return in about 1 week (around 9/11/2018) for placement of urinary catheter for Catheter Wilson. .    Subjective: Allergies   Allergen Reactions    Ace Inhibitors Hives    Lisinopril Hives     HIVES, ORAL SWELLING     Prior to Admission medications    Medication Sig Start Date End Date Taking? Authorizing Provider   Saccharomyces boulardii (FLORASTOR) 250 mg capsule Take 1 Cap by mouth daily. 6/11/18  Yes Ervin Brown NP   carvedilol (COREG) 3.125 mg tablet Take  by mouth two (2) times daily (with meals). Yes Historical Provider   atorvastatin (LIPITOR) 40 mg tablet Take 40 mg by mouth every evening. Yes Historical Provider   losartan (COZAAR) 100 mg tablet Take 100 mg by mouth daily. Yes Historical Provider   aspirin delayed-release 81 mg tablet Take 81 mg by mouth daily.    Yes Historical Provider   dutaseride (AVODART) 0.5 mg capsule Take 0.5 mg by mouth daily. Yes Historical Provider     Past Medical History:   Diagnosis Date    Arthritis     Atherosclerotic heart disease of native coronary artery without angina pectoris 11/10/2017    Benign prostatic hyperplasia without lower urinary tract symptoms 11/10/2017    CAD (coronary artery disease)     HX CABG    Cancer [facility] [address]     BLADDER    Carcinoma of lateral wall of urinary bladder ([facility] [address]     Chronic pain     Essential (primary) hypertension 11/10/2017    Essential hypertension 6/8/2018    GERD (gastroesophageal reflux disease)     History of colonoscopy 05/22/2014    Hypercholesterolemia     Hyperlipemia 11/10/2017    Microscopic hematuria     Osteoarthritis of left knee 2/1/2011    Other ill-defined conditions(799.89)     BPH     Past Surgical History:   Procedure Laterality Date    CARDIAC SURG PROCEDURE UNLIST  1999    CABG    HX CATARACT REMOVAL Bilateral 07/19/2016    HX COLONOSCOPY      HX GI      ESOPH DIL.     HX HEENT      TONSILLECTOMY    HX ORTHOPAEDIC  2008    R KNEE REPLACE    HX ORTHOPAEDIC  2004    ARTHROSCOPY R KNEE    HX ORTHOPAEDIC  2002    ARTHROSCOPY L KNEE    HX OTHER SURGICAL      ENDOSCOPY-X2    HX PROSTATECTOMY      HX TURP  11/02/2015    HX UROLOGICAL  11/02/2015    Transurethral resection of bladder tumor    TOTAL KNEE ARTHROPLASTY  2011    LEFT      Social History   Substance Use Topics    Smoking status: Never Smoker    Smokeless tobacco: Never Used    Alcohol use 2.0 oz/week     4 Glasses of wine per week      Comment: 4-5 OZ WINE DAILY      Family History   Problem Relation Age of Onset    Heart Disease Father     Cancer Mother    Erik Opitz Problems Neg Hx      Advance Care Planning 8/13/2018   Patient's Healthcare Decision Maker is: Legal Next of Kin   Primary Decision Maker Name Narendra Cronin   Primary Decision Maker Phone Number [phone]   Primary Decision Maker Relationship to Patient -   Confirm Advance Directive -   Patient Would Like to Complete Advance Directive -     Latest Laboratory Results:     Lab Results   Component Value Date/Time    WBC 5.9 08/10/2018 04:09 PM    HGB 12.5 (L) 08/10/2018 04:09 PM    HCT 38.8 08/10/2018 04:09 PM    PLATELET 120 69/29/3021 04:09 PM    MCV 97 08/10/2018 04:09 PM     Lab Results   Component Value Date/Time    Sodium 132 (L) 08/10/2018 04:09 PM    Potassium 4.2 08/10/2018 04:09 PM    Chloride 95 (L) 08/10/2018 04:09 PM    CO2 22 08/10/2018 04:09 PM    Anion gap 8 10/15/2015 02:55 PM    Glucose 147 (H) 08/10/2018 04:09 PM    BUN 16 08/10/2018 04:09 PM    Creatinine 0.61 (L) 08/10/2018 04:09 PM    BUN/Creatinine ratio 26 (H) 08/10/2018 04:09 PM    GFR est  08/10/2018 04:09 PM    GFR est non-AA 88 08/10/2018 04:09 PM    Calcium 9.3 08/10/2018 04:09 PM    Bilirubin, total 0.6 08/10/2018 04:09 PM    AST (SGOT) 18 08/10/2018 04:09 PM    Alk. phosphatase 126 (H) 08/10/2018 04:09 PM    Protein, total 6.6 08/10/2018 04:09 PM    Albumin 4.1 08/10/2018 04:09 PM    Globulin 2.5 10/15/2015 02:55 PM    A-G Ratio 1.6 08/10/2018 04:09 PM    ALT (SGPT) 13 08/10/2018 04:09 PM     Objective:     Vitals:    09/04/18 1554   BP: 119/66   Pulse: 78   Resp: 18   Temp: 98.4 °F (36.9 °C)   TempSrc: Oral   SpO2: 99%   Weight: 137 lb 6.4 oz (62.3 kg)   Height: 5' 8\" (1.727 m)     Wt Readings from Last 3 Encounters:   09/04/18 137 lb 6.4 oz (62.3 kg)   08/20/18 140 lb (63.5 kg)   08/13/18 139 lb 6.4 oz (63.2 kg)     BP Readings from Last 3 Encounters:   09/04/18 119/66   08/13/18 108/59   08/10/18 140/62     Review of Systems   Constitutional: Positive for activity change, appetite change, fatigue and unexpected weight change. Negative for fever. HENT: Negative for congestion, dental problem, hearing loss, postnasal drip, sinus pressure, sneezing, sore throat and trouble swallowing. Eyes: Negative for discharge, redness and visual disturbance.    Respiratory: Negative for apnea, cough, chest tightness, shortness of breath and wheezing. Cardiovascular: Negative for chest pain, palpitations and leg swelling. Gastrointestinal: Negative for abdominal distention, abdominal pain, blood in stool, constipation, diarrhea, nausea and vomiting. Endocrine: Positive for polydipsia and polyuria. Genitourinary: Positive for difficulty urinating, dysuria, frequency and urgency. Negative for flank pain. Musculoskeletal: Positive for arthralgias, back pain, gait problem, joint swelling and myalgias. Negative for neck pain. Skin: Positive for color change and pallor. Negative for rash and wound. Allergic/Immunologic: Negative for environmental allergies and food allergies. Neurological: Positive for weakness. Negative for dizziness, tremors, light-headedness, numbness and headaches. Hematological: Negative for adenopathy. Bruises/bleeds easily. Psychiatric/Behavioral: Positive for confusion and sleep disturbance. Negative for agitation. The patient is not nervous/anxious. Physical Exam   Constitutional: Vital signs are normal. He appears malnourished and dehydrated. He appears to not be writhing in pain. He appears unhealthy. He appears toxic. He has a sickly appearance. No distress. Thin, frail, fragile in appearing, elderly,  male. Subjective memory changes with signficant flight of ideas and thoughts. HENT:   Head: Normocephalic and atraumatic. Right Ear: Tympanic membrane, external ear and ear canal normal. Decreased hearing is noted. Left Ear: Tympanic membrane, external ear and ear canal normal. Decreased hearing is noted. Nose: Nose normal.   Mouth/Throat: Uvula is midline and oropharynx is clear and moist. Mucous membranes are not pale, dry and not cyanotic. No oral lesions. No uvula swelling. No posterior oropharyngeal edema or posterior oropharyngeal erythema.    Tenting of skin with moderate tongue furrowing present    Eyes: Conjunctivae, EOM and lids are normal. Pupils are equal, round, and reactive to light. Lids are everted and swept, no foreign bodies found. Neck: Trachea normal. No JVD present. Spinous process tenderness and muscular tenderness present. Rigidity present. Decreased range of motion present. No thyromegaly present. Cardiovascular: S1 normal, S2 normal, intact distal pulses and normal pulses. An irregularly irregular rhythm present. Bradycardia present. Exam reveals distant heart sounds. Exam reveals no gallop and no friction rub. No murmur heard. No extremity edema is present during today's exam.    Pulmonary/Chest: Effort normal and breath sounds normal.   Abdominal: Soft. Normal appearance and normal aorta. Bowel sounds are tinkling. There is no hepatosplenomegaly. There is no tenderness. There is no rigidity, no guarding and no CVA tenderness. He reports no appetitive as well as increased weight loss. Genitourinary: Rectum normal, testes/scrotum normal and penis normal.   Genitourinary Comments: Frequency, urgency, constant urge to urinate. Pain in lower abdomen that radiates across from the right flank to the left inguinal area when lying down. Musculoskeletal:        Right shoulder: He exhibits decreased range of motion, swelling, effusion, crepitus, deformity, pain, abnormal pulse and decreased strength. Left shoulder: He exhibits decreased range of motion, tenderness, bony tenderness, swelling, crepitus, deformity, pain, spasm and decreased strength. Generalized chronic extremity weakness is present, he is using a cane to ambulate. He is weak in all extremities and has no range of motion in either right or left shoulder. He states this is chronic. Lymphadenopathy:        Head (right side): No submandibular, no tonsillar and no posterior auricular adenopathy present. Head (left side): No submandibular, no tonsillar and no posterior auricular adenopathy present. He has no cervical adenopathy. Right: Inguinal adenopathy present.    Palpable right inguinal swelling mid inguinal region. Round, hard, attached. No pain with palpation. CT results noted the lymph node to be 4 cm x 2 cm. Neurological: He is alert. He has normal reflexes and intact cranial nerves. He is disoriented. He displays weakness, atrophy, abnormal stance and abnormal speech. A sensory deficit is present. He exhibits abnormal muscle tone. Coordination and gait abnormal. GCS score is 15. Skin: Skin is warm, dry and intact. No bruising and no rash noted. He is not diaphoretic. No cyanosis. There is pallor. Nails show clubbing. His skin is gray in color. He is malnourished in appearance. His hydration has improved slightly over the weekend and currently he is more up beat then Friday. His daughter and wife are here today and state he is having a harder time with medication administration problem solving. Psychiatric: Mood and memory normal. His affect is blunt. He expresses impulsivity. He exhibits disordered thought content. Baseline mood and affect unchanged from normal.       Disclaimer:   Mr. Ambrocio Arauz has been advised to call or return to our office if symptoms worsen/change/persist. We as a care team including the patient; discussed expected course/resolution/complications of diagnosis in detail today. Medication risks/benefits/costs/interactions/alternatives discussed Daniel Craft was given an after visit summary which includes diagnoses, current medications, & vitals. Daniel Craft expressed understanding with the diagnosis and plan.

## 2018-09-07 ENCOUNTER — HOME HEALTH ADMISSION (OUTPATIENT)
Dept: HOME HEALTH SERVICES | Facility: HOME HEALTH | Age: 83
End: 2018-09-07

## 2018-09-12 ENCOUNTER — TELEPHONE (OUTPATIENT)
Dept: GERIATRIC MEDICINE | Age: 83
End: 2018-09-12

## 2018-09-12 NOTE — TELEPHONE ENCOUNTER
I spoke with Doug in regards to her setting up patient with home care. She states that she has reached out several time to the patient and his wife. They have some resistance and continue to push out home health coming out. I explained to Doug that I would reach out to the patients daughters. I also explained that I had just contacted the patient and he was stated that he was sending the catheter supplies back and he was not sure is he wanted them. I asked patient to come in and  NP would talk with him again. And answer any questions or concerns he may have. Patient states he will think about it.

## 2018-09-14 ENCOUNTER — DOCUMENTATION ONLY (OUTPATIENT)
Dept: GERIATRIC MEDICINE | Age: 83
End: 2018-09-14

## 2018-09-14 ENCOUNTER — TELEPHONE (OUTPATIENT)
Dept: GERIATRIC MEDICINE | Age: 83
End: 2018-09-14

## 2018-09-14 NOTE — PROGRESS NOTES
Patients daughter called , I verified HIPPA and spoke with her. She wanted to confirm that she and her sisters know we have had some difficulties explaining things to their parents. They know we are trying to explain health concerns to them. She will be taking her father to see his cardiology next week. They have decided to wait on taking action with there hanson catheter until Mr. Craft sees  on the 24 th. I advised that was find we are here if they need us and will wait to hear from them.

## 2019-03-11 ENCOUNTER — CLINICAL SUPPORT (OUTPATIENT)
Dept: GERIATRIC MEDICINE | Age: 84
End: 2019-03-11

## 2019-03-11 VITALS — WEIGHT: 130.8 LBS | BODY MASS INDEX: 19.89 KG/M2

## 2019-03-11 DIAGNOSIS — R69 CHRONIC ILLNESS: ICD-10-CM

## 2019-03-11 DIAGNOSIS — I10 ESSENTIAL HYPERTENSION: Chronic | ICD-10-CM

## 2019-03-11 DIAGNOSIS — C61 MALIGNANT NEOPLASM OF PROSTATE (HCC): ICD-10-CM

## 2019-03-11 DIAGNOSIS — R63.4 EXCESSIVE WEIGHT LOSS: ICD-10-CM

## 2019-03-11 DIAGNOSIS — R79.9 ABNORMAL FINDING OF BLOOD CHEMISTRY: ICD-10-CM

## 2019-03-11 DIAGNOSIS — C67.4 MALIGNANT NEOPLASM OF POSTERIOR WALL OF URINARY BLADDER (HCC): ICD-10-CM

## 2019-03-11 DIAGNOSIS — I25.810 CORONARY ARTERY DISEASE INVOLVING CORONARY BYPASS GRAFT OF NATIVE HEART WITHOUT ANGINA PECTORIS: ICD-10-CM

## 2019-03-11 DIAGNOSIS — R62.7 ADULT FAILURE TO THRIVE: Primary | ICD-10-CM

## 2019-03-11 NOTE — PROGRESS NOTES
Zeenat Aaron presents for lab draw ordered by Daylin Gilliam RN MSN [facility]-NP    The following labs were drawn and sent to lab by Joy Sarkar LPN:    CBC, CMP, BNP, HgA1C and Vitamin D, Vitamin B12 folate, Thyroid panel, PSA w/ reflex, BNP PRO, Magnesium, LD,     The following tubes were sent:    Lavender  ( 3) and Zain (3)    Patient tolerated procedure well, blood obtained via venipuncture to left antecubital

## 2019-03-12 LAB — NT-PROBNP SERPL-MCNC: 585 PG/ML (ref 0–486)

## 2019-03-15 LAB
25(OH)D3+25(OH)D2 SERPL-MCNC: 25.6 NG/ML (ref 30–100)
ALBUMIN SERPL-MCNC: 3.9 G/DL (ref 3.2–4.6)
ALBUMIN/GLOB SERPL: 1.8 {RATIO} (ref 1.2–2.2)
ALP SERPL-CCNC: 101 IU/L (ref 39–117)
ALT SERPL-CCNC: 11 IU/L (ref 0–44)
AST SERPL-CCNC: 17 IU/L (ref 0–40)
BASOPHILS # BLD AUTO: 0 X10E3/UL (ref 0–0.2)
BASOPHILS NFR BLD AUTO: 0 %
BILIRUB SERPL-MCNC: 0.5 MG/DL (ref 0–1.2)
BUN SERPL-MCNC: 18 MG/DL (ref 10–36)
BUN/CREAT SERPL: 23 (ref 10–24)
CALCIUM SERPL-MCNC: 9.3 MG/DL (ref 8.6–10.2)
CHLORIDE SERPL-SCNC: 105 MMOL/L (ref 96–106)
CHOLEST SERPL-MCNC: 113 MG/DL (ref 100–199)
CO2 SERPL-SCNC: 19 MMOL/L (ref 20–29)
CREAT SERPL-MCNC: 0.78 MG/DL (ref 0.76–1.27)
EOSINOPHIL # BLD AUTO: 0.2 X10E3/UL (ref 0–0.4)
EOSINOPHIL NFR BLD AUTO: 2 %
ERYTHROCYTE [DISTWIDTH] IN BLOOD BY AUTOMATED COUNT: 13.6 % (ref 12.3–15.4)
EST. AVERAGE GLUCOSE BLD GHB EST-MCNC: 140 MG/DL
FOLATE SERPL-MCNC: 18.8 NG/ML
FT4I SERPL CALC-MCNC: 2.1 (ref 1.2–4.9)
GLOBULIN SER CALC-MCNC: 2.2 G/DL (ref 1.5–4.5)
GLUCOSE SERPL-MCNC: 132 MG/DL (ref 65–99)
HBA1C MFR BLD: 6.5 % (ref 4.8–5.6)
HCT VFR BLD AUTO: 35.4 % (ref 37.5–51)
HDLC SERPL-MCNC: 43 MG/DL
HGB BLD-MCNC: 11.9 G/DL (ref 13–17.7)
IMM GRANULOCYTES # BLD AUTO: 0 X10E3/UL (ref 0–0.1)
IMM GRANULOCYTES NFR BLD AUTO: 0 %
LDH SERPL-CCNC: 173 IU/L (ref 121–224)
LDH1 CFR SERPL ELPH: 25 % (ref 17–32)
LDH2 CFR SERPL ELPH: 32 % (ref 25–40)
LDH3 CFR SERPL ELPH: 22 % (ref 17–27)
LDH4 CFR SERPL ELPH: 9 % (ref 5–13)
LDH5 CFR SERPL ELPH: 12 % (ref 4–20)
LDLC SERPL CALC-MCNC: 58 MG/DL (ref 0–99)
LYMPHOCYTES # BLD AUTO: 0.7 X10E3/UL (ref 0.7–3.1)
LYMPHOCYTES NFR BLD AUTO: 10 %
MAGNESIUM SERPL-MCNC: 2 MG/DL (ref 1.6–2.3)
MCH RBC QN AUTO: 30.4 PG (ref 26.6–33)
MCHC RBC AUTO-ENTMCNC: 33.6 G/DL (ref 31.5–35.7)
MCV RBC AUTO: 90 FL (ref 79–97)
MONOCYTES # BLD AUTO: 0.4 X10E3/UL (ref 0.1–0.9)
MONOCYTES NFR BLD AUTO: 6 %
NEUTROPHILS # BLD AUTO: 5.9 X10E3/UL (ref 1.4–7)
NEUTROPHILS NFR BLD AUTO: 82 %
PLATELET # BLD AUTO: 200 X10E3/UL (ref 150–379)
POTASSIUM SERPL-SCNC: 4.3 MMOL/L (ref 3.5–5.2)
PROT SERPL-MCNC: 6.1 G/DL (ref 6–8.5)
PSA SERPL-MCNC: 0.7 NG/ML (ref 0–4)
RBC # BLD AUTO: 3.92 X10E6/UL (ref 4.14–5.8)
REFLEX CRITERIA: NORMAL
SODIUM SERPL-SCNC: 141 MMOL/L (ref 134–144)
T3RU NFR SERPL: 29 % (ref 24–39)
T4 SERPL-MCNC: 7.3 UG/DL (ref 4.5–12)
TRIGL SERPL-MCNC: 60 MG/DL (ref 0–149)
TSH SERPL DL<=0.005 MIU/L-ACNC: 2.77 UIU/ML (ref 0.45–4.5)
VIT B12 SERPL-MCNC: 554 PG/ML (ref 232–1245)
VLDLC SERPL CALC-MCNC: 12 MG/DL (ref 5–40)
WBC # BLD AUTO: 7.2 X10E3/UL (ref 3.4–10.8)

## 2019-03-25 NOTE — PROGRESS NOTES
Advise in witting- I would like to go over these with patient & family as well as the recommendations. These labs are much improved from the Aug 10, 2018 values. Lab Result Review  Reviewed results 03/25/19 by: Magali Martinez NP     CBC - stable. No signs of infection, blood loss anemia, or clotting issues. CMP- stable. Much improved. HGA1C- stable. Still showing signs of elevated diabetic values. We can discuss this at the follow up appointment. I am still not wanting to put him on treatment for this as he has no body mass and is at high risk for falls from hypoglycemia. Lipid Panel- stable. Still on Lipitor 40 mg daily. I would recommend cutting that dose in half with his current values. Will discuss at follow up. Thyroid Profile- stable. No treatment or changes to treatment needed. Vitamin D- still low on this value. Will discuss weekly supplement at follow up. LD Isoenzymes- much improved. Inflammatory response has resolved. PSA- excellent. Currently it is not even slightly elevated. This is great. Vitamin B 12 & Folate- stable. No need for further supplement. BNP-Pro: mild elevation of CHF. Correlate to physical exam.     RECOMMENDATIONS INCLUDE:   Labs are all improved. Would like to discuss changes to BP med as they have an voluntary recall of the Losartan. I would also like to cut his Lipitor dose in half as he is stable and on the lower end of normal to help prevent any liver issues in the future. Discuss diabetes number and what we should expect from this enzyme. Discuss CHF numbers.

## 2019-03-29 ENCOUNTER — OFFICE VISIT (OUTPATIENT)
Dept: GERIATRIC MEDICINE | Age: 84
End: 2019-03-29

## 2019-03-29 VITALS
HEIGHT: 68 IN | TEMPERATURE: 98.4 F | OXYGEN SATURATION: 98 % | WEIGHT: 129 LBS | HEART RATE: 63 BPM | RESPIRATION RATE: 18 BRPM | SYSTOLIC BLOOD PRESSURE: 131 MMHG | BODY MASS INDEX: 19.55 KG/M2 | DIASTOLIC BLOOD PRESSURE: 70 MMHG

## 2019-03-29 DIAGNOSIS — R62.7 ADULT FAILURE TO THRIVE: Primary | Chronic | ICD-10-CM

## 2019-03-29 DIAGNOSIS — I10 ESSENTIAL HYPERTENSION: ICD-10-CM

## 2019-03-29 DIAGNOSIS — R69 CHRONIC ILLNESS: ICD-10-CM

## 2019-03-29 DIAGNOSIS — E55.9 VITAMIN D DEFICIENCY: ICD-10-CM

## 2019-03-29 DIAGNOSIS — R63.4 EXCESSIVE WEIGHT LOSS: ICD-10-CM

## 2019-03-29 DIAGNOSIS — I25.810 CORONARY ARTERY DISEASE INVOLVING CORONARY BYPASS GRAFT OF NATIVE HEART WITHOUT ANGINA PECTORIS: ICD-10-CM

## 2019-03-29 DIAGNOSIS — R63.0 APPETITE LOST: ICD-10-CM

## 2019-03-29 DIAGNOSIS — Z71.2 ENCOUNTER TO DISCUSS TEST RESULTS: ICD-10-CM

## 2019-03-29 DIAGNOSIS — Z66 DNR (DO NOT RESUSCITATE): ICD-10-CM

## 2019-03-29 RX ORDER — DUTASTERIDE 0.5 MG/1
0.5 CAPSULE, LIQUID FILLED ORAL DAILY
Qty: 90 CAP | Refills: 1 | Status: SHIPPED | OUTPATIENT
Start: 2019-03-29

## 2019-03-29 RX ORDER — CARVEDILOL 3.12 MG/1
3.12 TABLET ORAL 2 TIMES DAILY WITH MEALS
Qty: 180 TAB | Refills: 1 | Status: SHIPPED | OUTPATIENT
Start: 2019-03-29

## 2019-03-29 RX ORDER — OLMESARTAN MEDOXOMIL 40 MG/1
40 TABLET ORAL DAILY
Qty: 90 TAB | Refills: 1 | Status: SHIPPED | OUTPATIENT
Start: 2019-03-29

## 2019-03-29 RX ORDER — ERGOCALCIFEROL 1.25 MG/1
50000 CAPSULE ORAL
Qty: 8 CAP | Refills: 3 | Status: SHIPPED | OUTPATIENT
Start: 2019-03-29 | End: 2019-05-31 | Stop reason: SDUPTHER

## 2019-03-29 RX ORDER — MEGESTROL ACETATE 40 MG/ML
400 SUSPENSION ORAL 2 TIMES DAILY
Qty: 600 ML | Refills: 3 | Status: SHIPPED | OUTPATIENT
Start: 2019-03-29 | End: 2019-04-25 | Stop reason: SDUPTHER

## 2019-03-29 NOTE — PATIENT INSTRUCTIONS
Abnormal Weight Loss: Care Instructions  Your Care Instructions    There are two types of weight loss--normal and abnormal. The normal kind happens when you are trying to lose weight by exercising more or eating less. The abnormal kind happens when you are not trying to lose weight. Many medical problems can cause abnormal weight loss. These include problems with your thyroid gland, long-term infections, mouth or throat problems that make it hard to eat, and digestive problems. They also include depression and cancer. Some medicines also may cause you to lose weight. You can work with your doctor to find the cause of your weight loss. You will probably need tests to do this. Follow-up care is a key part of your treatment and safety. Be sure to make and go to all appointments, and call your doctor if you are having problems. It's also a good idea to know your test results and keep a list of the medicines you take. How can you care for yourself at home? · Weigh yourself at the same time every day. It's best to do it first thing in the morning after you empty your bladder. Be sure to always wear the same amount of clothing. · Write down any changes in your weight and the possible causes. Discuss these with your doctor. · Your doctor may want you to change your diet. Do your best to follow his or her advice. · Ask your doctor if you should see a dietitian. This is a person who can help you plan meals that work best for your lifestyle. · Note any changes in bowel habits. These may include changes in how often you have a bowel movement. Other changes include the color and size of your stools and how solid they are. · If you are prescribed medicines, take them exactly as prescribed. Call your doctor if you think you are having a problem with your medicine. You will get more details on the specific medicines your doctor prescribes. When should you call for help?   Watch closely for changes in your health, and be sure to contact your doctor if:    · You do not get better as expected.     · You continue to lose weight. Where can you learn more? Go to http://thomas-jason.info/. Enter A790 in the search box to learn more about \"Abnormal Weight Loss: Care Instructions. \"  Current as of: June 25, 2018  Content Version: 11.9  © 6980-3788 [employer]. Care instructions adapted under license by [employer] (which disclaims liability or warranty for this information). If you have questions about a medical condition or this instruction, always ask your healthcare professional. [place] [address] any warranty or liability for your use of this information. Learning About High Blood Pressure  What is high blood pressure? Blood pressure is a measure of how hard the blood pushes against the walls of your arteries. It's normal for blood pressure to go up and down throughout the day, but if it stays up, you have high blood pressure. Another name for high blood pressure is hypertension. Two numbers tell you your blood pressure. The first number is the systolic pressure. It shows how hard the blood pushes when your heart is pumping. The second number is the diastolic pressure. It shows how hard the blood pushes between heartbeats, when your heart is relaxed and filling with blood. Your doctor will give you a goal for your blood pressure. Your goal will be based on your health and your age. High blood pressure (hypertension) means that the top number stays high, or the bottom number stays high, or both. High blood pressure increases the risk of stroke, heart attack, and other problems. You and your doctor will talk about your risks of these problems based on your blood pressure. What happens when you have high blood pressure? · Blood flows through your arteries with too much force. Over time, this damages the walls of your arteries. But you can't feel it.  High blood pressure usually doesn't cause symptoms. · Fat and calcium start to build up in your arteries. This buildup is called plaque. Plaque makes your arteries narrower and stiffer. Blood can't flow through them as easily. · This lack of good blood flow starts to damage some of the organs in your body. This can lead to problems such as coronary artery disease and heart attack, heart failure, stroke, kidney failure, and eye damage. How can you prevent high blood pressure? · Stay at a healthy weight. · Try to limit how much sodium you eat to less than 2,300 milligrams (mg) a day. If you limit your sodium to 1,500 mg a day, you can lower your blood pressure even more. ? Buy foods that are labeled \"unsalted,\" \"sodium-free,\" or \"low-sodium. \" Foods labeled \"reduced-sodium\" and \"light sodium\" may still have too much sodium. ? Flavor your food with garlic, lemon juice, onion, vinegar, herbs, and spices instead of salt. Do not use soy sauce, steak sauce, onion salt, garlic salt, mustard, or ketchup on your food. ? Use less salt (or none) when recipes call for it. You can often use half the salt a recipe calls for without losing flavor. · Be physically active. Get at least 30 minutes of exercise on most days of the week. Walking is a good choice. You also may want to do other activities, such as running, swimming, cycling, or playing tennis or team sports. · Limit alcohol to 2 drinks a day for men and 1 drink a day for women. · Eat plenty of fruits, vegetables, and low-fat dairy products. Eat less saturated and total fats. How is high blood pressure treated? · Your doctor will suggest making lifestyle changes. For example, your doctor may ask you to eat healthy foods, quit smoking, lose extra weight, and be more active. · If lifestyle changes don't help enough, your doctor may recommend that you take medicine. · When blood pressure is very high, medicines are needed to lower it.   Follow-up care is a key part of your treatment and safety. Be sure to make and go to all appointments, and call your doctor if you are having problems. It's also a good idea to know your test results and keep a list of the medicines you take. Where can you learn more? Go to http://thomas-jason.info/. Enter P501 in the search box to learn more about \"Learning About High Blood Pressure. \"  Current as of: July 22, 2018  Content Version: 11.9  © 0950-3572 [employer]. Care instructions adapted under license by [employer] (which disclaims liability or warranty for this information). If you have questions about a medical condition or this instruction, always ask your healthcare professional. [address] any warranty or liability for your use of this information.

## 2019-03-29 NOTE — PROGRESS NOTES
ADVISED PATIENT OF THE FOLLOWING HEALTH MAINTAINCE DUE  Health Maintenance Due   Topic Date Due    FOOT EXAM Q1  01/10/1938    MICROALBUMIN Q1  01/10/1938    EYE EXAM RETINAL OR DILATED  01/10/1938    GLAUCOMA SCREENING Q2Y  01/10/1993    DTaP/Tdap/Td series (2 - Td) 01/01/2016    Pneumococcal 65+ years (2 of 2 - PPSV23) 10/27/2017      Chief Complaint   Patient presents with    Results     Pt being seen today to review lab results. 1. Have you been to the ER, urgent care clinic since your last visit? Hospitalized since your last visit? No    2. Have you seen or consulted any other health care providers outside of the [address] [place] [address] since your last visit? Include any DEXA scan, mammography  or colon screening. No    3. Do you have an Advance Directive on file? no    4. Do you have a DNR on file? Full    Patient is accompanied by self and wife I have received verbal consent from Antonio Darden to discuss any/all medical information while they are present in the room. Advance Care Planning 8/13/2018   Patient's Healthcare Decision Maker is: Legal Next of Kin   Primary Decision Maker Name Patricio Lee   Primary Decision Maker Phone Number [phone]   Primary Decision Maker Relationship to Patient -   Confirm Advance Directive -   Patient Would Like to Complete Advance Directive -         [facility] [address] [place] [address], [facility] Jasmyn BROWNY AT [facility] [address] [address]  Phone: [phone] Fax: [phone]        Patient reminded during visit to bring all medication bottles, OTC medications to all appointments.

## 2019-03-29 NOTE — ACP (ADVANCE CARE PLANNING)
Advance Care Planning 8/13/2018   Patient's Healthcare Decision Maker is: Legal Next of Kin   Primary Decision Maker Name Rosa M Medellin   Primary Decision Maker Phone Number [phone]   Primary Decision Maker Relationship to Patient -   Confirm Advance Directive -   Patient Would Like to Complete Advance Directive -

## 2019-03-29 NOTE — LETTER
3/29/2019 5:41 PM 
 
RE:    Rick Rust Omar E [phone] [facility] [address] AFTER VISIT INSTRUCTIONS/PLAN OF CARE  03/29/19Discontinued Medication/Treatment:     
THE FOLLOWING MEDICATION/TREATMENTS HAVE BEEN STOPPED AFTER YOUR VISIT TODAY! Medications Discontinued During This Encounter Medication Reason  Saccharomyces boulardii (FLORASTOR) 250 mg capsule Therapy Completed  losartan (COZAAR) 100 mg tablet Alternate Therapy  carvedilol (COREG) 3.125 mg tablet Reorder  dutaseride (AVODART) 0.5 mg capsule Reorder Treatment Plan: 1. Adult failure to thrive 2. Excessive weight loss 3. Chronic illness 4. Appetite lost   
5. Coronary artery disease involving coronary bypass graft of native heart without angina pectoris 6. Essential hypertension 7. Vitamin D deficiency 8. Encounter to discuss test results 9. DNR (do not resuscitate) Your prescriptions have been electronically sent to the pharmacy you selected:  
 
[address] [place] [address], [address] [place] [address] ; [address] [facility] [address] Phone: [phone] Fax: [phone] If you do not receive your medications it is your responsibility to contact the pharmacy directly. Orders Placed This Encounter  DO NOT RESUSCITATE  megestrol (MEGACE) 400 mg/10 mL (10 mL) suspension Sig: Take 10 mL by mouth two (2) times a day. Keep in fridge, shake well before use. Help with weight gain! Dispense:  600 mL Refill:  3 Deliver to [facility]  ergocalciferol (ERGOCALCIFEROL) 50,000 unit capsule Sig: Take 1 Cap by mouth every seven (7) days. Indications: Vitamin D Deficiency (High Dose Therapy) Dispense:  8 Cap Refill:  3  
 olmesartan (BENICAR) 40 mg tablet  (this replaces your Losartan, blood pressure med) Sig: Take 1 Tab by mouth daily. Indications: chronic heart failure, high blood pressure Dispense:  90 Tab Refill:  1 Deliver to [facility] Return Visit/Follow Up:   
 
Return in 1 week for Rocael Salinas to check your weight. We appreciate you allowing us to participate in your health care. Any questions do not hesitate to call Mckayla Olmstead [facility] Nurse at [phone].   
Sincerely, 
 
 
Mel Back NP

## 2019-03-29 NOTE — PROGRESS NOTES
Reason for Visit/HPI:    Elliott Jackman is a 80 y.o. male patient who presents today for   Chief Complaint   Patient presents with    Results     Pt being seen today to review lab results. Follow Up: Follow-up and Dispositions    · Return in about 1 month (around 4/29/2019) for with the NP for follow up to your treatment plan. Diagnosis/Treatment Plan:    Diagnoses and all orders for this visit: Discussed patients frail, fragile, body with continued weight loss. He is getting weaker. He does not think he is and does admit to drinking the 1 ensure daily as I directed 4 months ago. He has since lost more weight as he was 138# now he is 129#. He continues to report no appetite for food. Discussed the use of Megace for attempting to bulk him up for more stamina. He is agreeable to the treatment. He is also agreeable to have his weight checked once weekly with Rafal Band for monitoring his treatment. Mrs. Craft requests an up to date copy of his medication list and the ones that I have ordered today. I have printed such an order and have it delivered to them in their apartment tonight. 1. Adult failure to thrive  Comments:  Chronic, ongoing. He has not put on any weight todate. He is doing well per himself and his wife. Discussed the need for weight gain. Orders:  -     megestrol (MEGACE) 400 mg/10 mL (10 mL) suspension; Take 10 mL by mouth two (2) times a day. Keep in fridge, shake well before use. Help with weight gain! 2. Excessive weight loss  Comments: Total of 37 # to date in 1 year. Orders:  -     megestrol (MEGACE) 400 mg/10 mL (10 mL) suspension; Take 10 mL by mouth two (2) times a day. Keep in fridge, shake well before use. Help with weight gain! 3. Chronic illness  Comments:  Multiple medical conditions. Orders:  -     megestrol (MEGACE) 400 mg/10 mL (10 mL) suspension; Take 10 mL by mouth two (2) times a day. Keep in fridge, shake well before use.  Help with weight gain!    4. Appetite lost  Comments:  He has no appetite. Discussed starting the Megace to help see if we can put some weight on him. He is agreeable to the treatment. I requested he weight weekly. Orders:  -     megestrol (MEGACE) 400 mg/10 mL (10 mL) suspension; Take 10 mL by mouth two (2) times a day. Keep in fridge, shake well before use. Help with weight gain! 5. 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 Kacey Sheets  Orders:  -     carvedilol (COREG) 3.125 mg tablet; Take 1 Tab by mouth two (2) times daily (with meals). -     olmesartan (BENICAR) 40 mg tablet; Take 1 Tab by mouth daily. Indications: chronic heart failure, high blood pressure    6. Essential hypertension  Comments:  stable, changed from Losartan to Benicar due to the recalls and rollercoaster of safety with med. Orders:  -     carvedilol (COREG) 3.125 mg tablet; Take 1 Tab by mouth two (2) times daily (with meals). -     olmesartan (BENICAR) 40 mg tablet; Take 1 Tab by mouth daily. Indications: chronic heart failure, high blood pressure    7. Vitamin D deficiency  Comments:  He is currenty deficient on his Vitamin D. I will order the once weekly supplement. Orders:  -     ergocalciferol (ERGOCALCIFEROL) 50,000 unit capsule; Take 1 Cap by mouth every seven (7) days. Indications: Vitamin D Deficiency (High Dose Therapy)    8. Encounter to discuss test results  Comments:  Reviewed labs. Working on Vitamin D, sugar, & weight gain. Orders:  -     ergocalciferol (ERGOCALCIFEROL) 50,000 unit capsule; Take 1 Cap by mouth every seven (7) days. Indications: Vitamin D Deficiency (High Dose Therapy)    9. DNR (do not resuscitate)  Comments:  Discussed and educated patinet on this process. He states he has a DDNR somewhere in his apartment but not sure where it is.  He wants DDNR  Orders:  -     DO NOT RESUSCITATE    Other orders  -     dutasteride (AVODART) 0.5 mg capsule; Take 1 Cap by mouth daily. Discontinued Care: The following treatment modalities have been discontinued by the provider today:   Medications Discontinued During This Encounter   Medication Reason    Saccharomyces boulardii (FLORASTOR) 250 mg capsule Therapy Completed    losartan (COZAAR) 100 mg tablet Alternate Therapy    carvedilol (COREG) 3.125 mg tablet Reorder    dutaseride (AVODART) 0.5 mg capsule Reorder       Subjective: Allergies   Allergen Reactions    Ace Inhibitors Hives    Lisinopril Hives     HIVES, ORAL SWELLING     Prior to Admission medications    Medication Sig Start Date End Date Taking? Authorizing Provider   carvedilol (COREG) 3.125 mg tablet Take 1 Tab by mouth two (2) times daily (with meals). 3/29/19  Yes Freda Crocker NP   dutasteride (AVODART) 0.5 mg capsule Take 1 Cap by mouth daily. 3/29/19  Yes Freda Crocker, NP   megestrol (MEGACE) 400 mg/10 mL (10 mL) suspension Take 10 mL by mouth two (2) times a day. Keep in fridge, shake well before use. Help with weight gain! 3/29/19  Yes Freda Crocker, NP   ergocalciferol (ERGOCALCIFEROL) 50,000 unit capsule Take 1 Cap by mouth every seven (7) days. Indications: Vitamin D Deficiency (High Dose Therapy) 3/29/19  Yes Freda Crocker NP   olmesartan (BENICAR) 40 mg tablet Take 1 Tab by mouth daily. Indications: chronic heart failure, high blood pressure 3/29/19  Yes Freda Crocker NP   atorvastatin (LIPITOR) 40 mg tablet Take 40 mg by mouth every evening. Yes Provider, Historical   aspirin delayed-release 81 mg tablet Take 81 mg by mouth daily.    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  HX HEENT      TONSILLECTOMY    HX ORTHOPAEDIC  2008    R KNEE REPLACE    HX ORTHOPAEDIC  2004    ARTHROSCOPY R KNEE    HX ORTHOPAEDIC  2002    ARTHROSCOPY L KNEE    HX OTHER SURGICAL      ENDOSCOPY-X2    HX PROSTATECTOMY      HX TURP  11/02/2015    HX UROLOGICAL  11/02/2015    Transurethral resection of bladder tumor    TOTAL KNEE ARTHROPLASTY  2011    LEFT      Social History     Tobacco Use    Smoking status: Never Smoker    Smokeless tobacco: Never Used   Substance Use Topics    Alcohol use:  Yes     Alcohol/week: 2.0 oz     Types: 4 Glasses of wine per week     Comment: 4-5 OZ WINE DAILY    Drug use: No      Family History   Problem Relation Age of Onset    Heart Disease Father     Cancer Mother    Jayjay Rodriguez Problems Neg Hx      Advance Care Planning 8/13/2018   Patient's Healthcare Decision Maker is: Legal Next of Dary 69   Primary Decision Maker Name Arvilla Dubin   Primary Decision Maker Phone Number [phone]   Primary Decision Maker Relationship to Patient -   Confirm Advance Directive -   Patient Would Like to Complete Advance Directive -     Test Results:     Clinical Support on 03/11/2019   Component Date Value Ref Range Status    LD 03/11/2019 173  121 - 224 IU/L Final    (LD) Fraction 1 03/11/2019 25  17 - 32 % Final    (LD) Fraction 2 03/11/2019 32  25 - 40 % Final    (LD) Fraction 3 03/11/2019 22  17 - 27 % Final    (LD) Fraction 4 03/11/2019 9  5 - 13 % Final    (LD) Fraction 5 03/11/2019 12  4 - 20 % Final    WBC 03/11/2019 7.2  3.4 - 10.8 x10E3/uL Final    RBC 03/11/2019 3.92* 4.14 - 5.80 x10E6/uL Final    HGB 03/11/2019 11.9* 13.0 - 17.7 g/dL Final    HCT 03/11/2019 35.4* 37.5 - 51.0 % Final    MCV 03/11/2019 90  79 - 97 fL Final    MCH 03/11/2019 30.4  26.6 - 33.0 pg Final    MCHC 03/11/2019 33.6  31.5 - 35.7 g/dL Final    RDW 03/11/2019 13.6  12.3 - 15.4 % Final    PLATELET 04/84/6885 293  150 - 379 x10E3/uL Final    NEUTROPHILS 03/11/2019 82  Not Estab. % Final    Lymphocytes 03/11/2019 10  Not Estab. % Final    MONOCYTES 03/11/2019 6  Not Estab. % Final    EOSINOPHILS 03/11/2019 2  Not Estab. % Final    BASOPHILS 03/11/2019 0  Not Estab. % Final    ABS. NEUTROPHILS 03/11/2019 5.9  1.4 - 7.0 x10E3/uL Final    Abs Lymphocytes 03/11/2019 0.7  0.7 - 3.1 x10E3/uL Final    ABS. MONOCYTES 03/11/2019 0.4  0.1 - 0.9 x10E3/uL Final    ABS. EOSINOPHILS 03/11/2019 0.2  0.0 - 0.4 x10E3/uL Final    ABS. BASOPHILS 03/11/2019 0.0  0.0 - 0.2 x10E3/uL Final    IMMATURE GRANULOCYTES 03/11/2019 0  Not Estab. % Final    ABS. IMM. GRANS. 03/11/2019 0.0  0.0 - 0.1 x10E3/uL Final    Hemoglobin A1c 03/11/2019 6.5* 4.8 - 5.6 % Final    Comment:          Prediabetes: 5.7 - 6.4           Diabetes: >6.4           Glycemic control for adults with diabetes: <7.0      Estimated average glucose 03/11/2019 140  mg/dL Final    Cholesterol, total 03/11/2019 113  100 - 199 mg/dL Final    Triglyceride 03/11/2019 60  0 - 149 mg/dL Final    HDL Cholesterol 03/11/2019 43  >39 mg/dL Final    VLDL, calculated 03/11/2019 12  5 - 40 mg/dL Final    LDL, calculated 03/11/2019 58  0 - 99 mg/dL Final    Magnesium 03/11/2019 2.0  1.6 - 2.3 mg/dL Final    Glucose 03/11/2019 132* 65 - 99 mg/dL Final    Comment: Specimen received in contact with cells. No visible hemolysis  present.  However GLUC may be decreased and K increased. Clinical  correlation indicated.  BUN 03/11/2019 18  10 - 36 mg/dL Final    Creatinine 03/11/2019 0.78  0.76 - 1.27 mg/dL Final    GFR est non-AA 03/11/2019 79  >59 mL/min/1.73 Final    GFR est AA 03/11/2019 91  >59 mL/min/1.73 Final    BUN/Creatinine ratio 03/11/2019 23  10 - 24 Final    Sodium 03/11/2019 141  134 - 144 mmol/L Final    Potassium 03/11/2019 4.3  3.5 - 5.2 mmol/L Final    Comment: Specimen received in contact with cells. No visible hemolysis  present. However GLUC may be decreased and K increased. Clinical  correlation indicated.  Chloride 03/11/2019 105  96 - 106 mmol/L Final    CO2 03/11/2019 19* 20 - 29 mmol/L Final    Calcium 03/11/2019 9.3  8.6 - 10.2 mg/dL Final    Protein, total 03/11/2019 6.1  6.0 - 8.5 g/dL Final    Albumin 03/11/2019 3.9  3.2 - 4.6 g/dL Final    GLOBULIN, TOTAL 03/11/2019 2.2  1.5 - 4.5 g/dL Final    A-G Ratio 03/11/2019 1.8  1.2 - 2.2 Final    Bilirubin, total 03/11/2019 0.5  0.0 - 1.2 mg/dL Final    Alk. phosphatase 03/11/2019 101  39 - 117 IU/L Final    AST (SGOT) 03/11/2019 17  0 - 40 IU/L Final    ALT (SGPT) 03/11/2019 11  0 - 44 IU/L Final    PROBNP 03/11/2019 585* 0 - 486 pg/mL Final    Comment: The following cut-points have been suggested for the  use of proBNP for the diagnostic evaluation of heart  failure (HF) in patients with acute dyspnea:  Modality                     Age           Optimal Cut                             (years)            Point  ------------------------------------------------------  Diagnosis (rule in HF)        <50            450 pg/mL                            50 - 75            900 pg/mL                                >75           1800 pg/mL  Exclusion (rule out HF)  Age independent     300 pg/mL      Prostate Specific Ag 03/11/2019 0.7  0.0 - 4.0 ng/mL Final    Comment: Roche ECLIA methodology.   According to the American Urological Association, Serum PSA should  decrease and remain at undetectable levels after radical  prostatectomy. The AUA defines biochemical recurrence as an initial  PSA value 0.2 ng/mL or greater followed by a subsequent confirmatory  PSA value 0.2 ng/mL or greater. Values obtained with different assay methods or kits cannot be used  interchangeably. Results cannot be interpreted as absolute evidence  of the presence or absence of malignant disease.  Reflex Criteria 03/11/2019 Comment   Final    Comment: The percent free PSA is performed on a reflex basis only when the  total PSA is between 4.0 and 10.0 ng/mL.  TSH 03/11/2019 2.770  0.450 - 4.500 uIU/mL Final    T4, Total 03/11/2019 7.3  4.5 - 12.0 ug/dL Final    T3 Uptake 03/11/2019 29  24 - 39 % Final    Free Thyroxine Index (FTI) 03/11/2019 2.1  1.2 - 4.9 Final    Vitamin B12 03/11/2019 554  232 - 1,245 pg/mL Final    Folate 03/11/2019 18.8  >3.0 ng/mL Final    Comment: A serum folate concentration of less than 3.1 ng/mL is  considered to represent clinical deficiency.  VITAMIN D, 25-HYDROXY 03/11/2019 25.6* 30.0 - 100.0 ng/mL Final    Comment: Vitamin D deficiency has been defined by the [address] practice guideline as a  level of serum 25-OH vitamin D less than 20 ng/mL (1,2). The Endocrine Society went on to further define vitamin D  insufficiency as a level between 21 and 29 ng/mL (2). 1. IOM ([place] of [employer]). 2010. Dietary reference     intakes for calcium and D. [facility]: The     [employer]. 2. Jaelyn MF, Marisol KAPLAN, Vidya LUA, et al.     Evaluation, treatment, and prevention of vitamin D     deficiency: an Endocrine Society clinical practice     guideline. JCEM. 2011 Jul; 96(7):1911-30.          Objective:     Vitals:    03/29/19 1111   BP: 131/70   Pulse: 63   Resp: 18   Temp: 98.4 °F (36.9 °C)   TempSrc: Oral   SpO2: 98%   Weight: 129 lb (58.5 kg)   Height: 5' 8\" (1.727 m)     Wt Readings from Last 3 Encounters:   03/29/19 129 lb (58.5 kg)   03/11/19 130 lb 12.8 oz (59.3 kg)   09/04/18 137 lb 6.4 oz (62.3 kg)     BP Readings from Last 3 Encounters:   03/29/19 131/70   09/04/18 119/66   08/13/18 108/59     Review of Systems   Constitutional: Positive for activity change, appetite change, fatigue and unexpected weight change. Negative for chills and fever. HENT: Negative for congestion, dental problem, hearing loss, postnasal drip, sinus pressure, sneezing, sore throat and trouble swallowing. Eyes: Negative for discharge, redness and visual disturbance. Respiratory: Negative for apnea, cough, chest tightness, shortness of breath and wheezing. Cardiovascular: Negative for chest pain, palpitations and leg swelling. Gastrointestinal: Negative for abdominal distention, abdominal pain, blood in stool, constipation, diarrhea, nausea and vomiting. Endocrine: Negative for polydipsia, polyphagia and polyuria. Genitourinary: Negative for flank pain, frequency and urgency. Musculoskeletal: Positive for arthralgias, back pain, gait problem, joint swelling, myalgias, neck pain and neck stiffness. Skin: Negative for color change, pallor, rash and wound. Allergic/Immunologic: Positive for environmental allergies. Negative for food allergies. Neurological: Positive for weakness. Negative for dizziness, tremors, light-headedness, numbness and headaches. Hematological: Negative for adenopathy. Psychiatric/Behavioral: Positive for confusion. Negative for agitation and sleep disturbance. The patient is not nervous/anxious. Physical Exam   Constitutional: Vital signs are normal. He appears malnourished and dehydrated. He appears to not be writhing in pain. He appears unhealthy. He appears cachectic. He appears toxic. He has a sickly appearance. No distress. Thin, frail, fragile in appearing, elderly,  male. He is here today with his wife for review of lab results. HENT:   Head: Normocephalic and atraumatic.    Right Ear: External ear and ear canal normal. A middle ear effusion is present. Decreased hearing is noted. Left Ear: External ear and ear canal normal. A middle ear effusion is present. Decreased hearing is noted. Nose: Mucosal edema and rhinorrhea present. Mouth/Throat: Uvula is midline. Mucous membranes are not pale, dry and not cyanotic. No oral lesions. No uvula swelling. Posterior oropharyngeal erythema present. No posterior oropharyngeal edema. Tenting of skin with moderate tongue furrowing present    Eyes: Pupils are equal, round, and reactive to light. Conjunctivae, EOM and lids are normal. Lids are everted and swept, no foreign bodies found. Neck: Trachea normal. No JVD present. Spinous process tenderness and muscular tenderness present. Neck rigidity present. Decreased range of motion present. No thyromegaly present. Cardiovascular: S1 normal, S2 normal, intact distal pulses and normal pulses. An irregularly irregular rhythm present. Bradycardia present. Exam reveals distant heart sounds. Exam reveals no gallop and no friction rub. No murmur heard. No extremity edema is present during today's exam.    Pulmonary/Chest: Effort normal and breath sounds normal.   Upper airway congestion. Seems to be seasonal allergies as his eyes are watering as well and throat has drainage from his nose. Abdominal: Soft. Normal appearance and normal aorta. Bowel sounds are tinkling. There is no hepatosplenomegaly. There is no tenderness. There is no rigidity, no guarding and no CVA tenderness. He reports no appetitive as well as increased weight loss. Genitourinary: Rectum normal, testes/scrotum normal and penis normal.   Genitourinary Comments: Frequency, urgency, constant urge to urinate. Pain in lower abdomen that radiates across from the right flank to the left inguinal area when lying down.     Musculoskeletal:        Right shoulder: He exhibits decreased range of motion, swelling, effusion, crepitus, deformity, pain, abnormal pulse and decreased strength. Left shoulder: He exhibits decreased range of motion, tenderness, bony tenderness, swelling, crepitus, deformity, pain, spasm and decreased strength. Generalized chronic extremity weakness is present, he is using a cane to ambulate. He is weak in all extremities and has no range of motion in either right or left shoulder. He states this is chronic. Lymphadenopathy:        Head (right side): No submandibular, no tonsillar and no posterior auricular adenopathy present. Head (left side): No submandibular, no tonsillar and no posterior auricular adenopathy present. He has no cervical adenopathy. Right: Inguinal adenopathy present. Palpable right inguinal swelling mid inguinal region. Round, hard, attached. No pain with palpation. CT results noted the lymph node to be 4 cm x 2 cm. Neurological: He is alert. He has normal reflexes and intact cranial nerves. He is disoriented. He displays weakness, atrophy, abnormal stance and abnormal speech. A sensory deficit is present. He exhibits abnormal muscle tone. Coordination and gait abnormal. GCS score is 15. Skin: Skin is warm, dry and intact. Bruising noted. No rash noted. He is not diaphoretic. No cyanosis. There is pallor. Nails show clubbing. His skin is gray in color. He is malnourished in appearance. His hydration has improved slightly over the weekend and currently he is more up beat then Friday. His daughter and wife are here today and state he is having a harder time with medication administration problem solving. Psychiatric: Mood and memory normal. His affect is blunt and inappropriate. He expresses impulsivity. He exhibits disordered thought content.    Baseline mood and affect unchanged from normal.         Disclaimer:   Mr. Brianne Toledo has been advised to call or return to our office if symptoms worsen/change/persist. We as a care team including the patient; discussed expected course/resolution/complications of diagnosis in detail today. Medication risks/benefits/costs/interactions/alternatives discussed Daniel Craft was given an after visit summary which includes diagnoses, current medications, & vitals. Daniel Craft expressed understanding with the diagnosis and plan.

## 2019-04-09 ENCOUNTER — CLINICAL SUPPORT (OUTPATIENT)
Dept: GERIATRIC MEDICINE | Age: 84
End: 2019-04-09

## 2019-04-09 VITALS — WEIGHT: 125 LBS | BODY MASS INDEX: 19.01 KG/M2

## 2019-04-09 DIAGNOSIS — R63.0 APPETITE LOST: ICD-10-CM

## 2019-04-09 DIAGNOSIS — R62.7 ADULT FAILURE TO THRIVE: Primary | ICD-10-CM

## 2019-04-09 DIAGNOSIS — R63.4 EXCESSIVE WEIGHT LOSS: ICD-10-CM

## 2019-04-09 NOTE — PROGRESS NOTES
Chief Complaint   Patient presents with    Weight Management     Pt here for weekly weight.      Wt 125 lb (56.7 kg)   BMI 19.01 kg/m²

## 2019-04-12 ENCOUNTER — CLINICAL SUPPORT (OUTPATIENT)
Dept: GERIATRIC MEDICINE | Age: 84
End: 2019-04-12

## 2019-04-12 VITALS — WEIGHT: 125.4 LBS | BODY MASS INDEX: 19.07 KG/M2

## 2019-04-12 DIAGNOSIS — R63.0 APPETITE LOST: ICD-10-CM

## 2019-04-12 DIAGNOSIS — R62.7 ADULT FAILURE TO THRIVE: Primary | ICD-10-CM

## 2019-04-12 DIAGNOSIS — R63.4 EXCESSIVE WEIGHT LOSS: ICD-10-CM

## 2019-04-17 ENCOUNTER — CLINICAL SUPPORT (OUTPATIENT)
Dept: GERIATRIC MEDICINE | Age: 84
End: 2019-04-17

## 2019-04-17 VITALS — WEIGHT: 123 LBS | BODY MASS INDEX: 18.7 KG/M2

## 2019-04-17 DIAGNOSIS — R63.4 EXCESSIVE WEIGHT LOSS: ICD-10-CM

## 2019-04-17 DIAGNOSIS — R62.7 ADULT FAILURE TO THRIVE: Primary | ICD-10-CM

## 2019-04-17 DIAGNOSIS — R63.0 APPETITE LOST: ICD-10-CM

## 2019-04-17 NOTE — PROGRESS NOTES
Chief Complaint   Patient presents with    Weight Management     Pt came in for weekly weight check. Weight is down 2#. Pt to return on Friday for another weight check. Wt 123 lb (55.8 kg)   BMI 18.70 kg/m²   Weight discussed with NP. Will review weights.

## 2019-04-18 ENCOUNTER — TELEPHONE (OUTPATIENT)
Dept: GERIATRIC MEDICINE | Age: 84
End: 2019-04-18

## 2019-04-18 NOTE — TELEPHONE ENCOUNTER
E-mail received from Mr. Craft's daughter Deepak Ibrahim with the following questions? Robert Boswell-     I was concerned when Dadeliel told me that he thought that recent weight check this week was at 120# or 119#!! Hopefully he did not see the scales well--or hear well. Please let us know what his actual weight checks have been. Thanks. And no worries if you have already left today for the Easter weekend. Happy Easter~  [place]     My response is. ....        Wt Readings from Last 3 Encounters:   04/17/19 123 lb (55.8 kg)   04/12/19 125 lb 6.4 oz (56.9 kg)   04/09/19 125 lb (56.7 kg)

## 2019-04-19 ENCOUNTER — CLINICAL SUPPORT (OUTPATIENT)
Dept: GERIATRIC MEDICINE | Age: 84
End: 2019-04-19

## 2019-04-19 VITALS — BODY MASS INDEX: 18.85 KG/M2 | WEIGHT: 124 LBS

## 2019-04-19 DIAGNOSIS — R63.0 APPETITE LOST: ICD-10-CM

## 2019-04-19 DIAGNOSIS — R62.7 ADULT FAILURE TO THRIVE: ICD-10-CM

## 2019-04-19 DIAGNOSIS — R63.4 EXCESSIVE WEIGHT LOSS: Primary | ICD-10-CM

## 2019-04-19 NOTE — PROGRESS NOTES
Chief Complaint   Patient presents with    Weight Management     Pt came in for weight check.      Wt 124 lb (56.2 kg)   BMI 18.85 kg/m²

## 2019-04-22 ENCOUNTER — CLINICAL SUPPORT (OUTPATIENT)
Dept: GERIATRIC MEDICINE | Age: 84
End: 2019-04-22

## 2019-04-22 VITALS — WEIGHT: 123 LBS | BODY MASS INDEX: 18.7 KG/M2

## 2019-04-22 DIAGNOSIS — R63.4 EXCESSIVE WEIGHT LOSS: Primary | ICD-10-CM

## 2019-04-22 DIAGNOSIS — R63.0 APPETITE LOST: ICD-10-CM

## 2019-04-22 DIAGNOSIS — R62.7 ADULT FAILURE TO THRIVE: ICD-10-CM

## 2019-04-22 NOTE — PROGRESS NOTES
Chief Complaint   Patient presents with    Weight Management     Pt came in for weekly weight check. Pt is down 1 lb. I advised pt I will talk with NP and she will advised what next step is. I did confirm with pt he is taking megace 10 ml BID.      Wt 123 lb (55.8 kg)   BMI 18.70 kg/m²

## 2019-04-25 ENCOUNTER — TELEPHONE (OUTPATIENT)
Dept: GERIATRIC MEDICINE | Age: 84
End: 2019-04-25

## 2019-04-25 ENCOUNTER — CLINICAL SUPPORT (OUTPATIENT)
Dept: GERIATRIC MEDICINE | Age: 84
End: 2019-04-25

## 2019-04-25 VITALS — WEIGHT: 123 LBS | BODY MASS INDEX: 18.7 KG/M2

## 2019-04-25 DIAGNOSIS — R69 CHRONIC ILLNESS: ICD-10-CM

## 2019-04-25 DIAGNOSIS — R62.7 ADULT FAILURE TO THRIVE: Chronic | ICD-10-CM

## 2019-04-25 DIAGNOSIS — R63.4 EXCESSIVE WEIGHT LOSS: ICD-10-CM

## 2019-04-25 DIAGNOSIS — R63.0 APPETITE LOST: ICD-10-CM

## 2019-04-25 RX ORDER — MEGESTROL ACETATE 40 MG/ML
400 SUSPENSION ORAL 2 TIMES DAILY
Qty: 600 ML | Refills: 3 | Status: SHIPPED | OUTPATIENT
Start: 2019-04-25 | End: 2019-05-13 | Stop reason: SDUPTHER

## 2019-04-25 NOTE — PROGRESS NOTES
Chief Complaint   Patient presents with    Weight Management     Pt here for weekly weight check.      Wt 123 lb (55.8 kg)   BMI 18.70 kg/m²

## 2019-05-06 ENCOUNTER — CLINICAL SUPPORT (OUTPATIENT)
Dept: GERIATRIC MEDICINE | Age: 84
End: 2019-05-06

## 2019-05-06 VITALS — BODY MASS INDEX: 18.55 KG/M2 | WEIGHT: 122 LBS

## 2019-05-06 DIAGNOSIS — R63.4 EXCESSIVE WEIGHT LOSS: ICD-10-CM

## 2019-05-06 DIAGNOSIS — R69 CHRONIC ILLNESS: ICD-10-CM

## 2019-05-06 DIAGNOSIS — R62.7 ADULT FAILURE TO THRIVE: Primary | ICD-10-CM

## 2019-05-06 NOTE — PROGRESS NOTES
Chief Complaint   Patient presents with    Weight Management     Pt here for weight check. Wt 122 lb (55.3 kg)   BMI 18.55 kg/m²    Patient states he is taking Megace as prescribed.

## 2019-05-10 ENCOUNTER — CLINICAL SUPPORT (OUTPATIENT)
Dept: GERIATRIC MEDICINE | Age: 84
End: 2019-05-10

## 2019-05-10 VITALS — WEIGHT: 122 LBS | BODY MASS INDEX: 18.55 KG/M2

## 2019-05-10 DIAGNOSIS — R62.7 ADULT FAILURE TO THRIVE: Primary | ICD-10-CM

## 2019-05-10 DIAGNOSIS — R63.4 EXCESSIVE WEIGHT LOSS: ICD-10-CM

## 2019-05-10 NOTE — PROGRESS NOTES
Chief Complaint   Patient presents with    Weight Management     Pt here for weekly weight check.      Wt 122 lb (55.3 kg)   BMI 18.55 kg/m²

## 2019-05-13 ENCOUNTER — OFFICE VISIT (OUTPATIENT)
Dept: GERIATRIC MEDICINE | Age: 84
End: 2019-05-13

## 2019-05-13 VITALS
WEIGHT: 120 LBS | BODY MASS INDEX: 18.19 KG/M2 | RESPIRATION RATE: 18 BRPM | OXYGEN SATURATION: 98 % | HEART RATE: 70 BPM | SYSTOLIC BLOOD PRESSURE: 118 MMHG | HEIGHT: 68 IN | DIASTOLIC BLOOD PRESSURE: 64 MMHG

## 2019-05-13 DIAGNOSIS — I25.111 CORONARY ARTERY DISEASE INVOLVING NATIVE CORONARY ARTERY OF NATIVE HEART WITH ANGINA PECTORIS WITH DOCUMENTED SPASM (HCC): ICD-10-CM

## 2019-05-13 DIAGNOSIS — R63.4 EXCESSIVE WEIGHT LOSS: ICD-10-CM

## 2019-05-13 DIAGNOSIS — R62.7 ADULT FAILURE TO THRIVE: Primary | ICD-10-CM

## 2019-05-13 DIAGNOSIS — Z71.89 ENCOUNTER FOR FAMILY CONFERENCE WITH PATIENT PRESENT: ICD-10-CM

## 2019-05-13 DIAGNOSIS — C67.4 MALIGNANT NEOPLASM OF POSTERIOR WALL OF URINARY BLADDER (HCC): ICD-10-CM

## 2019-05-13 RX ORDER — MEGESTROL ACETATE 40 MG/ML
SUSPENSION ORAL
Refills: 3 | COMMUNITY
Start: 2019-04-26 | End: 2019-05-13 | Stop reason: SDUPTHER

## 2019-05-13 RX ORDER — MEGESTROL ACETATE 40 MG/ML
SUSPENSION ORAL
Qty: 480 ML | Refills: 3 | Status: SHIPPED | OUTPATIENT
Start: 2019-05-13 | End: 2019-06-05 | Stop reason: ALTCHOICE

## 2019-05-13 RX ORDER — MIRTAZAPINE 7.5 MG/1
TABLET, FILM COATED ORAL
Qty: 60 TAB | Refills: 0 | Status: SHIPPED | OUTPATIENT
Start: 2019-05-13 | End: 2019-06-12

## 2019-05-13 NOTE — PROGRESS NOTES
Reason for Visit/HPI:   
Yaima Champagne is a 80 y.o. male patient who presents today for Chief Complaint Patient presents with  Weight Management Pt here for follow up with weekly weights. He is down 2# today from Friday Follow Up: Follow-up and Dispositions · Return in about 4 days (around 5/17/2019) for with the NP for follow up to your treatment plan. Diagnosis/Treatment Plan:   
Diagnoses and all orders for this visit:  Mr. Alvaro Sun is here with his wife and daughter Gary Medellin to talk about his weight loss. He has been taking the medication bid x 1 month. He has not had any weight gain but continues to lose weight. Per patient he is eating well and feels like he is full after meals. Discussed we are not sure what else can be done as the more we look the worse the diagnosis will be. I advised them it has to be something organic not allowing him to absorb the nutrients and hold on to them for his weight. We will increase the megace to qid x 1 month. He will return for more labs in 2 weeks with weekly weight checks. WT / BMI WEIGHT BODY MASS INDEX  
5/13/2019 120 lb 18.25 kg/m2 5/10/2019 122 lb 18.55 kg/m2 5/6/2019 122 lb 18.55 kg/m2 4/25/2019 123 lb 18.7 kg/m2 4/22/2019 123 lb 18.7 kg/m2 4/19/2019 124 lb 18.85 kg/m2 4/17/2019 123 lb 18.7 kg/m2 4/12/2019 125 lb 6.4 oz 19.07 kg/m2 4/9/2019 125 lb 19.01 kg/m2 3/29/2019 129 lb 19.61 kg/m2 3/11/2019 130 lb 12.8 oz 19.89 kg/m2 9/4/2018 137 lb 6.4 oz 20.89 kg/m2 8/20/2018 140 lb 21.29 kg/m2 8/13/2018 139 lb 6.4 oz 22.5 kg/m2 8/10/2018 139 lb 12.8 oz 22.56 kg/m2 6/20/2018 146 lb 3.2 oz 23.6 kg/m2 6/11/2018 146 lb 22.91 kg/m2 6/8/2018 146 lb 22.91 kg/m2 6/8/2018 146 lb 22.92 kg/m2  
11/2/2015 165 lb 25.09 kg/m2  
10/15/2015 165 lb 25.09 kg/m2  
10/13/2014 158 lb 24.03 kg/m2 2/2/2011 160 lb 24.33 kg/m2 1. Adult failure to thrive 2. Excessive weight loss 3. Malignant neoplasm of posterior wall of urinary bladder ([address] 4. Coronary artery disease involving native coronary artery of native heart with angina pectoris with documented spasm ([address] 5. Encounter for family conference with patient present Other orders 
-     megestrol (MEGACE) 400 mg/10 mL (40 mg/mL) suspension; Take 10 mls by mouth 4 x (four) daily. -     mirtazapine (REMERON) 7.5 mg tablet; Take 1 Tab by mouth nightly for 7 days, THEN 2 Tabs nightly for 21 days. Discontinued Care: The following treatment modalities have been discontinued by the provider today:  
Medications Discontinued During This Encounter Medication Reason  megestrol (MEGACE) 400 mg/10 mL (10 mL) suspension Duplicate Order  megestrol (MEGACE) 400 mg/10 mL (40 mg/mL) suspension Reorder Subjective: Allergies Allergen Reactions  Ace Inhibitors Hives  Lisinopril Hives HIVES, ORAL SWELLING Prior to Admission medications Medication Sig Start Date End Date Taking? Authorizing Provider  
megestrol (MEGACE) 400 mg/10 mL (40 mg/mL) suspension Take 10 mls by mouth 4 x (four) daily. 5/13/19  Yes Francisco Javier Tamez NP  
mirtazapine (REMERON) 7.5 mg tablet Take 1 Tab by mouth nightly for 7 days, THEN 2 Tabs nightly for 21 days. 5/13/19 6/10/19 Yes Francisco Javier Tamez NP  
carvedilol (COREG) 3.125 mg tablet Take 1 Tab by mouth two (2) times daily (with meals). 3/29/19   Francisco Javier Tamez NP  
dutasteride (AVODART) 0.5 mg capsule Take 1 Cap by mouth daily. 3/29/19   Francisco Javier Tamez NP  
ergocalciferol (ERGOCALCIFEROL) 50,000 unit capsule Take 1 Cap by mouth every seven (7) days. Indications: Vitamin D Deficiency (High Dose Therapy) 3/29/19   Francisco Javier Tamez NP  
olmesartan (BENICAR) 40 mg tablet Take 1 Tab by mouth daily. Indications: chronic heart failure, high blood pressure 3/29/19   Francisco Javier Tamez NP  
atorvastatin (LIPITOR) 40 mg tablet Take 40 mg by mouth every evening. Provider, Historical  
aspirin delayed-release 81 mg tablet Take 81 mg by mouth daily. Provider, Historical  
 
Past Medical History:  
Diagnosis Date  Arthritis  Atherosclerotic heart disease of native coronary artery without angina pectoris 11/10/2017  Benign prostatic hyperplasia without lower urinary tract symptoms 11/10/2017  CAD (coronary artery disease) HX CABG  Cancer ([facility] [address] BLADDER  
 Carcinoma of lateral wall of urinary bladder ([facility] [address]  Chronic pain  Essential (primary) hypertension 11/10/2017  Essential hypertension 6/8/2018  GERD (gastroesophageal reflux disease)  History of colonoscopy 05/22/2014  Hypercholesterolemia  Hyperlipemia 11/10/2017  Microscopic hematuria  Osteoarthritis of left knee 2/1/2011  Other ill-defined conditions(799.89) BPH Past Surgical History:  
Procedure Laterality Date [address] CABG  
 HX CATARACT REMOVAL Bilateral 07/19/2016  HX COLONOSCOPY    
 HX GI    
 ESOPH DIL.  HX HEENT    
 [facility] R KNEE REPLACE  
 HX ORTHOPAEDIC  2004 ARTHROSCOPY R KNEE  
 HX ORTHOPAEDIC  2002 ARTHROSCOPY L KNEE  
 HX OTHER SURGICAL    
 ENDOSCOPY-X2  
 HX PROSTATECTOMY  HX TURP  11/02/2015  HX UROLOGICAL  11/02/2015 Transurethral resection of bladder tumor [place] ARTHROPLASTY  2011 LEFT Social History Tobacco Use  Smoking status: Never Smoker  Smokeless tobacco: Never Used Substance Use Topics  Alcohol use: Yes Alcohol/week: 2.0 oz Types: 4 Glasses of wine per week Comment: 4-5 OZ WINE DAILY  Drug use: No  
  
Family History Problem Relation Age of Onset  Heart Disease Father  Cancer Mother  Anesth Problems Neg Hx Advance Care Planning 8/13/2018 Patient's Healthcare Decision Maker is: Legal Next of Kin Primary Decision Maker Name Peter More Primary Decision Maker Phone Number [phone] Primary Decision Maker Relationship to Patient -  
Confirm Advance Directive - Patient Would Like to Complete Advance Directive - Test Results:  
 
Clinical Support on 03/11/2019 Component Date Value Ref Range Status  LD 03/11/2019 173  121 - 224 IU/L Final  
 (LD) Fraction 1 03/11/2019 25  17 - 32 % Final  
 (LD) Fraction 2 03/11/2019 32  25 - 40 % Final  
 (LD) Fraction 3 03/11/2019 22  17 - 27 % Final  
 (LD) Fraction 4 03/11/2019 9  5 - 13 % Final  
 (LD) Fraction 5 03/11/2019 12  4 - 20 % Final  
 WBC 03/11/2019 7.2  3.4 - 10.8 x10E3/uL Final  
 RBC 03/11/2019 3.92* 4.14 - 5.80 x10E6/uL Final  
 HGB 03/11/2019 11.9* 13.0 - 17.7 g/dL Final  
 HCT 03/11/2019 35.4* 37.5 - 51.0 % Final  
 MCV 03/11/2019 90  79 - 97 fL Final  
 MCH 03/11/2019 30.4  26.6 - 33.0 pg Final  
 MCHC 03/11/2019 33.6  31.5 - 35.7 g/dL Final  
 RDW 03/11/2019 13.6  12.3 - 15.4 % Final  
 PLATELET 88/56/4551 786  150 - 379 x10E3/uL Final  
 NEUTROPHILS 03/11/2019 82  Not Estab. % Final  
 Lymphocytes 03/11/2019 10  Not Estab. % Final  
 MONOCYTES 03/11/2019 6  Not Estab. % Final  
 EOSINOPHILS 03/11/2019 2  Not Estab. % Final  
 BASOPHILS 03/11/2019 0  Not Estab. % Final  
 ABS. NEUTROPHILS 03/11/2019 5.9  1.4 - 7.0 x10E3/uL Final  
 Abs Lymphocytes 03/11/2019 0.7  0.7 - 3.1 x10E3/uL Final  
 ABS. MONOCYTES 03/11/2019 0.4  0.1 - 0.9 x10E3/uL Final  
 ABS. EOSINOPHILS 03/11/2019 0.2  0.0 - 0.4 x10E3/uL Final  
 ABS. BASOPHILS 03/11/2019 0.0  0.0 - 0.2 x10E3/uL Final  
 IMMATURE GRANULOCYTES 03/11/2019 0  Not Estab. % Final  
 ABS. IMM. GRANS. 03/11/2019 0.0  0.0 - 0.1 x10E3/uL Final  
 Hemoglobin A1c 03/11/2019 6.5* 4.8 - 5.6 % Final  
 Comment:          Prediabetes: 5.7 - 6.4 Diabetes: >6.4 Glycemic control for adults with diabetes: <7.0  Estimated average glucose 03/11/2019 140  mg/dL Final  
  Cholesterol, total 03/11/2019 113  100 - 199 mg/dL Final  
 Triglyceride 03/11/2019 60  0 - 149 mg/dL Final  
 HDL Cholesterol 03/11/2019 43  >39 mg/dL Final  
 VLDL, calculated 03/11/2019 12  5 - 40 mg/dL Final  
 LDL, calculated 03/11/2019 58  0 - 99 mg/dL Final  
 Magnesium 03/11/2019 2.0  1.6 - 2.3 mg/dL Final  
 Glucose 03/11/2019 132* 65 - 99 mg/dL Final  
 Comment: Specimen received in contact with cells. No visible hemolysis 
present. However GLUC may be decreased and K increased. Clinical 
correlation indicated.  BUN 03/11/2019 18  10 - 36 mg/dL Final  
 Creatinine 03/11/2019 0.78  0.76 - 1.27 mg/dL Final  
 GFR est non-AA 03/11/2019 79  >59 mL/min/1.73 Final  
 GFR est AA 03/11/2019 91  >59 mL/min/1.73 Final  
 BUN/Creatinine ratio 03/11/2019 23  10 - 24 Final  
 Sodium 03/11/2019 141  134 - 144 mmol/L Final  
 Potassium 03/11/2019 4.3  3.5 - 5.2 mmol/L Final  
 Comment: Specimen received in contact with cells. No visible hemolysis 
present. However GLUC may be decreased and K increased. Clinical 
correlation indicated.  Chloride 03/11/2019 105  96 - 106 mmol/L Final  
 CO2 03/11/2019 19* 20 - 29 mmol/L Final  
 Calcium 03/11/2019 9.3  8.6 - 10.2 mg/dL Final  
 Protein, total 03/11/2019 6.1  6.0 - 8.5 g/dL Final  
 Albumin 03/11/2019 3.9  3.2 - 4.6 g/dL Final  
 GLOBULIN, TOTAL 03/11/2019 2.2  1.5 - 4.5 g/dL Final  
 A-G Ratio 03/11/2019 1.8  1.2 - 2.2 Final  
 Bilirubin, total 03/11/2019 0.5  0.0 - 1.2 mg/dL Final  
 Alk.  phosphatase 03/11/2019 101  39 - 117 IU/L Final  
 AST (SGOT) 03/11/2019 17  0 - 40 IU/L Final  
 ALT (SGPT) 03/11/2019 11  0 - 44 IU/L Final  
 PROBNP 03/11/2019 585* 0 - 486 pg/mL Final  
 Comment: The following cut-points have been suggested for the 
use of proBNP for the diagnostic evaluation of heart 
failure (HF) in patients with acute dyspnea: 
Modality                     Age           Optimal Cut 
 (years)            Point 
------------------------------------------------------ 
Diagnosis (rule in HF)        <50            450 pg/mL 50 - 75            900 pg/mL 
                              >75           1800 pg/mL Exclusion (rule out HF)  Age independent     300 pg/mL  Prostate Specific Ag 03/11/2019 0.7  0.0 - 4.0 ng/mL Final  
 Comment: Roche ECLIA methodology. According to the American Urological Association, Serum PSA should 
decrease and remain at undetectable levels after radical 
prostatectomy. The AUA defines biochemical recurrence as an initial 
PSA value 0.2 ng/mL or greater followed by a subsequent confirmatory PSA value 0.2 ng/mL or greater. Values obtained with different assay methods or kits cannot be used 
interchangeably. Results cannot be interpreted as absolute evidence 
of the presence or absence of malignant disease.  Reflex Criteria 03/11/2019 Comment   Final  
 Comment: The percent free PSA is performed on a reflex basis only when the 
total PSA is between 4.0 and 10.0 ng/mL.  TSH 03/11/2019 2.770  0.450 - 4.500 uIU/mL Final  
 T4, Total 03/11/2019 7.3  4.5 - 12.0 ug/dL Final  
 T3 Uptake 03/11/2019 29  24 - 39 % Final  
 Free Thyroxine Index (FTI) 03/11/2019 2.1  1.2 - 4.9 Final  
 Vitamin B12 03/11/2019 554  232 - 1,245 pg/mL Final  
 Folate 03/11/2019 18.8  >3.0 ng/mL Final  
 Comment: A serum folate concentration of less than 3.1 ng/mL is 
considered to represent clinical deficiency.  VITAMIN D, 25-HYDROXY 03/11/2019 25.6* 30.0 - 100.0 ng/mL Final  
 Comment: Vitamin D deficiency has been defined by the [address] practice guideline as a 
level of serum 25-OH vitamin D less than 20 ng/mL (1,2). The Endocrine Society went on to further define vitamin D 
insufficiency as a level between 21 and 29 ng/mL (2). 1. IOM ([place] [employer]). 2010. Dietary reference intakes for calcium and D. [facility]: The 
   VideoNot.es. 2. Jaelyn MF, Marisol NC, Vidya LUA, et al. 
   Evaluation, treatment, and prevention of vitamin D 
   deficiency: an Endocrine Society clinical practice 
   guideline. JCEM. 2011 Jul; 96(1):7081-30. Objective:  
 
Vitals:  
 05/13/19 1352 BP: 118/64 Pulse: 70 Resp: 18 SpO2: 98% Weight: 120 lb (54.4 kg) Height: 5' 8\" (1.727 m) Wt Readings from Last 3 Encounters:  
05/13/19 120 lb (54.4 kg) 05/10/19 122 lb (55.3 kg) 05/06/19 122 lb (55.3 kg) BP Readings from Last 3 Encounters:  
05/13/19 118/64  
03/29/19 131/70  
09/04/18 119/66 Review of Systems Constitutional: Positive for activity change, appetite change, fatigue and unexpected weight change. Negative for chills and fever. HENT: Negative for congestion, dental problem, hearing loss, postnasal drip, sinus pressure, sneezing, sore throat and trouble swallowing. Eyes: Negative for discharge, redness and visual disturbance. Respiratory: Negative for apnea, cough, chest tightness, shortness of breath and wheezing. Cardiovascular: Negative for chest pain, palpitations and leg swelling. Gastrointestinal: Negative for abdominal distention, abdominal pain, blood in stool, constipation, diarrhea, nausea and vomiting. Endocrine: Negative for polydipsia, polyphagia and polyuria. Genitourinary: Negative for flank pain, frequency and urgency. Musculoskeletal: Positive for arthralgias, back pain, gait problem, joint swelling, myalgias, neck pain and neck stiffness. Skin: Negative for color change, pallor, rash and wound. Allergic/Immunologic: Positive for environmental allergies. Negative for food allergies. Neurological: Positive for weakness. Negative for dizziness, tremors, light-headedness, numbness and headaches. Hematological: Negative for adenopathy. Psychiatric/Behavioral: Positive for confusion. Negative for agitation and sleep disturbance. The patient is not nervous/anxious. Physical Exam  
Constitutional: Vital signs are normal. He appears malnourished and dehydrated. He appears to not be writhing in pain. He appears unhealthy. He appears cachectic. He appears toxic. He has a sickly appearance. No distress. Thin, frail, fragile in appearing, elderly,  male. He is here today with his wife for review of lab results. HENT:  
Head: Normocephalic and atraumatic. Right Ear: External ear and ear canal normal. A middle ear effusion is present. Decreased hearing is noted. Left Ear: External ear and ear canal normal. A middle ear effusion is present. Decreased hearing is noted. Nose: Mucosal edema and rhinorrhea present. Mouth/Throat: Uvula is midline. Mucous membranes are not pale, dry and not cyanotic. No oral lesions. No uvula swelling. Posterior oropharyngeal erythema present. No posterior oropharyngeal edema. Tenting of skin with moderate tongue furrowing present Eyes: Pupils are equal, round, and reactive to light. Conjunctivae, EOM and lids are normal. Lids are everted and swept, no foreign bodies found. Neck: Trachea normal. No JVD present. Spinous process tenderness and muscular tenderness present. Neck rigidity present. Decreased range of motion present. No thyromegaly present. Cardiovascular: S1 normal, S2 normal, intact distal pulses and normal pulses. An irregularly irregular rhythm present. Bradycardia present. Exam reveals distant heart sounds. Exam reveals no gallop and no friction rub. No murmur heard. No extremity edema is present during today's exam.   
Pulmonary/Chest: Effort normal and breath sounds normal.  
Upper airway congestion. Seems to be seasonal allergies as his eyes are watering as well and throat has drainage from his nose. Abdominal: Soft. Normal appearance and normal aorta.  Bowel sounds are tinkling. There is no hepatosplenomegaly. There is no tenderness. There is no rigidity, no guarding and no CVA tenderness. He reports no appetitive as well as increased weight loss. Genitourinary: Rectum normal, testes/scrotum normal and penis normal.  
Genitourinary Comments: Frequency, urgency, constant urge to urinate. Pain in lower abdomen that radiates across from the right flank to the left inguinal area when lying down. Musculoskeletal:  
     Right shoulder: He exhibits decreased range of motion, swelling, effusion, crepitus, deformity, pain, abnormal pulse and decreased strength. Left shoulder: He exhibits decreased range of motion, tenderness, bony tenderness, swelling, crepitus, deformity, pain, spasm and decreased strength. Generalized chronic extremity weakness is present, he is using a cane to ambulate. He is weak in all extremities and has no range of motion in either right or left shoulder. He states this is chronic. Lymphadenopathy:  
     Head (right side): No submandibular, no tonsillar and no posterior auricular adenopathy present. Head (left side): No submandibular, no tonsillar and no posterior auricular adenopathy present. He has no cervical adenopathy. Right: Inguinal adenopathy present. Palpable right inguinal swelling mid inguinal region. Round, hard, attached. No pain with palpation. CT results noted the lymph node to be 4 cm x 2 cm. Neurological: He is alert. He has normal reflexes and intact cranial nerves. He is disoriented. He displays weakness, atrophy, abnormal stance and abnormal speech. A sensory deficit is present. He exhibits abnormal muscle tone. Coordination and gait abnormal. GCS score is 15. Skin: Skin is warm, dry and intact. Bruising noted. No rash noted. He is not diaphoretic. No cyanosis. There is pallor. Nails show clubbing. His skin is gray in color. He is malnourished in appearance.  His hydration has improved slightly over the weekend and currently he is more up beat then Friday. His daughter and wife are here today and state he is having a harder time with medication administration problem solving. Psychiatric: Mood and memory normal. His affect is blunt and inappropriate. He expresses impulsivity. He exhibits disordered thought content. Baseline mood and affect unchanged from normal.   
 
  
Disclaimer:  
Mr. Faith Adan has been advised to call or return to our office if symptoms worsen/change/persist. We as a care team including the patient; discussed expected course/resolution/complications of diagnosis in detail today. Medication risks/benefits/costs/interactions/alternatives discussed Daniel Craft was given an after visit summary which includes diagnoses, current medications, & vitals. Daniel Craft expressed understanding with the diagnosis and plan.

## 2019-05-13 NOTE — PROGRESS NOTES
ADVISED PATIENT OF THE FOLLOWING HEALTH MAINTAINCE DUE Health Maintenance Due Topic Date Due  Pneumococcal 65+ years (2 of 2 - PPSV23) 10/27/2017  MEDICARE YEARLY EXAM  06/12/2019 Chief Complaint Patient presents with  Weight Management Pt here for follow up with weekly weights. He is down 2# today from Friday 1. Have you been to the ER, urgent care clinic since your last visit? Hospitalized since your last visit? No 
 
2. Have you seen or consulted any other health care providers outside of the [facility] since your last visit? Include any DEXA scan, mammography  or colon screening. No 
 
3. Do you have an Advance Directive on file? yes 4. Do you have a DNR on file? DNR Patient is accompanied by self, daughter and wife I have received verbal consent from Antonio Darden to discuss any/all medical information while they are present in the room. Advance Care Planning 8/13/2018 Patient's Healthcare Decision Maker is: Legal Next of Kin Primary Decision Maker Name Doris Walters Primary Decision Maker Phone Number [phone] Primary Decision Maker Relationship to Patient -  
Confirm Advance Directive - Patient Would Like to Complete Advance Directive - [employer] [facility] [employer] [address] [place] [address], [address] [place] [address] 
[id number] Phone: [phone] Fax: [phone] Patient reminded during visit to bring all medication bottles, OTC medications to all appointments.

## 2019-05-13 NOTE — PATIENT INSTRUCTIONS
Abnormal Weight Loss: Care Instructions Your Care Instructions There are two types of weight lossnormal and abnormal. The normal kind happens when you are trying to lose weight by exercising more or eating less. The abnormal kind happens when you are not trying to lose weight. Many medical problems can cause abnormal weight loss. These include problems with your thyroid gland, long-term infections, mouth or throat problems that make it hard to eat, and digestive problems. They also include depression and cancer. Some medicines also may cause you to lose weight. You can work with your doctor to find the cause of your weight loss. You will probably need tests to do this. Follow-up care is a key part of your treatment and safety. Be sure to make and go to all appointments, and call your doctor if you are having problems. It's also a good idea to know your test results and keep a list of the medicines you take. How can you care for yourself at home? · Weigh yourself at the same time every day. It's best to do it first thing in the morning after you empty your bladder. Be sure to always wear the same amount of clothing. · Write down any changes in your weight and the possible causes. Discuss these with your doctor. · Your doctor may want you to change your diet. Do your best to follow his or her advice. · Ask your doctor if you should see a dietitian. This is a person who can help you plan meals that work best for your lifestyle. · Note any changes in bowel habits. These may include changes in how often you have a bowel movement. Other changes include the color and size of your stools and how solid they are. · If you are prescribed medicines, take them exactly as prescribed. Call your doctor if you think you are having a problem with your medicine. You will get more details on the specific medicines your doctor prescribes. When should you call for help? Watch closely for changes in your health, and be sure to contact your doctor if: 
  · You do not get better as expected.  
  · You continue to lose weight. Where can you learn more? Go to http://thomas-jason.info/. Enter A790 in the search box to learn more about \"Abnormal Weight Loss: Care Instructions. \" Current as of: June 25, 2018 Content Version: 11.9 © 4169-4051 [employer] ([employer]). Care instructions adapted under license by [employer] (which disclaims liability or warranty for this information). If you have questions about a medical condition or this instruction, always ask your healthcare professional. [address] any warranty or liability for your use of this information.

## 2019-05-17 ENCOUNTER — CLINICAL SUPPORT (OUTPATIENT)
Dept: GERIATRIC MEDICINE | Age: 84
End: 2019-05-17

## 2019-05-17 VITALS — HEIGHT: 68 IN | BODY MASS INDEX: 18.64 KG/M2 | WEIGHT: 123 LBS

## 2019-05-17 DIAGNOSIS — R63.0 APPETITE LOST: ICD-10-CM

## 2019-05-17 DIAGNOSIS — R63.4 EXCESSIVE WEIGHT LOSS: ICD-10-CM

## 2019-05-17 DIAGNOSIS — R62.7 ADULT FAILURE TO THRIVE: Primary | ICD-10-CM

## 2019-05-17 NOTE — PROGRESS NOTES
Chief Complaint   Patient presents with    Weight Management     Pt here for weekly weight check. Today weight is up 3 lbs.      Ht 5' 8\" (1.727 m)   Wt 123 lb (55.8 kg)   BMI 18.70 kg/m²

## 2019-05-24 ENCOUNTER — CLINICAL SUPPORT (OUTPATIENT)
Dept: GERIATRIC MEDICINE | Age: 84
End: 2019-05-24

## 2019-05-24 ENCOUNTER — OFFICE VISIT (OUTPATIENT)
Dept: GERIATRIC MEDICINE | Age: 84
End: 2019-05-24

## 2019-05-24 VITALS
WEIGHT: 122 LBS | RESPIRATION RATE: 18 BRPM | OXYGEN SATURATION: 97 % | TEMPERATURE: 98.5 F | HEIGHT: 68 IN | BODY MASS INDEX: 18.49 KG/M2

## 2019-05-24 VITALS — WEIGHT: 122 LBS | TEMPERATURE: 98.5 F | BODY MASS INDEX: 18.55 KG/M2

## 2019-05-24 DIAGNOSIS — R82.71 ASYMPTOMATIC BACTERIURIA: Primary | ICD-10-CM

## 2019-05-24 DIAGNOSIS — R62.7 ADULT FAILURE TO THRIVE: ICD-10-CM

## 2019-05-24 DIAGNOSIS — R69 CHRONIC ILLNESS: ICD-10-CM

## 2019-05-24 DIAGNOSIS — R63.4 EXCESSIVE WEIGHT LOSS: ICD-10-CM

## 2019-05-24 LAB
BILIRUB UR QL STRIP: NEGATIVE
GLUCOSE UR-MCNC: NEGATIVE MG/DL
KETONES P FAST UR STRIP-MCNC: NEGATIVE MG/DL
PH UR STRIP: 5 [PH] (ref 4.6–8)
PROT UR QL STRIP: POSITIVE
SP GR UR STRIP: 1 (ref 1–1.03)
UA UROBILINOGEN AMB POC: NORMAL (ref 0.2–1)
URINALYSIS CLARITY POC: CLEAR
URINALYSIS COLOR POC: YELLOW
URINE BLOOD POC: NORMAL
URINE LEUKOCYTES POC: NORMAL
URINE NITRITES POC: NEGATIVE

## 2019-05-24 NOTE — PROGRESS NOTES
ADVISED PATIENT OF THE FOLLOWING HEALTH MAINTAINCE DUE  Health Maintenance Due   Topic Date Due    Pneumococcal 65+ years (2 of 2 - PPSV23) 10/27/2017    MEDICARE YEARLY EXAM  06/12/2019      Chief Complaint   Patient presents with    UTI     Patietns daughter called while here and states that she thinks her father has a UTI. Patient deined any s/s.  Weight Management     Patient here for weekly weight check. Daughter was notified of weekly weight. Patients daughter also states her father fell on Monday but refused to get help and or let anyone here at [facility] know. Her sister was on the way into town so she came by and helped him up. Daughter does not want patient to know she has called with this information. 1. Have you been to the ER, urgent care clinic since your last visit? Hospitalized since your last visit? No    2. Have you seen or consulted any other health care providers outside of the [address] [place] [address] since your last visit? Include any DEXA scan, mammography  or colon screening. No    3. Do you have an Advance Directive on file? yes    4. Do you have a DNR on file? DNR    Patient is accompanied by self I have received verbal consent from Antonio Darden to discuss any/all medical information while they are present in the room. Advance Care Planning 8/13/2018   Patient's Healthcare Decision Maker is: Legal Next of Kin   Primary Decision Maker Name Adrienne Kim   Primary Decision Maker Phone Number [phone]   Primary Decision Maker Relationship to Patient -   Confirm Advance Directive -   Patient Would Like to Complete Advance Directive -         [address] [place] [address], [facility] [place] Jasm[facility] WORRELL AT [facility] [address]  [address] [address]  Phone: [phone] Fax: [phone]        Patient reminded during visit to bring all medication bottles, OTC medications to all appointments.      Results for orders placed or performed in visit on 05/24/19   AMB POC URINALYSIS DIP STICK MANUAL W/O MICRO   Result Value Ref Range    Color (UA POC) Yellow     Clarity (UA POC) Clear     Glucose (UA POC) Negative Negative    Bilirubin (UA POC) Negative Negative    Ketones (UA POC) Negative Negative    Specific gravity (UA POC) 1.005 1.001 - 1.035    Blood (UA POC) 3+ Negative    pH (UA POC) 5.0 4.6 - 8.0    Protein (UA POC) Positive Negative    Urobilinogen (UA POC) normal 0.2 - 1    Nitrites (UA POC) Negative Negative    Leukocyte esterase (UA POC) 3+ Negative

## 2019-05-24 NOTE — PROGRESS NOTES
Reason for Visit/HPI:    Zhen Johnston is a 80 y.o. male patient who presents today for routine weekly weights by the office nurse. The patient has a diagnosis of FTT and has had a 20lb weight loss over the last year. He is currently on Remeron and Megace which do not appear to be working for him. The patient's daughter called and spoke with the clinic nurse and asked for a urine sample to be sent. The urine dipstick showed +3 leukocytes, negative nitrites. The patient denies any urinary sx's such as frequency, urgency, dysuria, new onset hematuria, suprapubic pain, costovertebral tenderness or fever. I have explained to the patient that positive leukocytes in the urine is not diagnostic of a UTI; Pyuria is not an indication for treatment in the absence of symptoms. The literature is very clear on this. Additionally, it sounds like the patient is not ingesting enough calories and is suffering with moderate protein calorie malnutrition. He woke up at 10 am and had a bowl of oatmeal with raisins, slice of toast and 1/2 an egg. On my exam it is 3 pm and he did not have lunch. He told me he was going back home to take a nap and would have lunch at 5pm in the cafeteria. He drinks about 1.5 bottles of ensure throughout the day. Again, he has major caloric deficit. Follow Up:    Next friday     Diagnosis/Treatment Plan:    1. Asymptomatic bacteruria - no abx indicated    2. FTT/Moderate Protein Calorie malnutrition - encouraged more food. C/W remeron and megace. Maybe consider marinol in the future and a calorie count sheet. Discontinued Care: The following treatment modalities have been discontinued by the provider today:   There are no discontinued medications. Subjective: Allergies   Allergen Reactions    Ace Inhibitors Hives    Lisinopril Hives     HIVES, ORAL SWELLING     Prior to Admission medications    Medication Sig Start Date End Date Taking?  Authorizing Provider   megestrol (MEGACE) 400 mg/10 mL (40 mg/mL) suspension Take 10 mls by mouth 4 x (four) daily. 5/13/19   Barbara Kowalski NP   mirtazapine (REMERON) 7.5 mg tablet Take 1 Tab by mouth nightly for 7 days, THEN 2 Tabs nightly for 21 days. 5/13/19 6/10/19  Barbara Godinezx, NP   carvedilol (COREG) 3.125 mg tablet Take 1 Tab by mouth two (2) times daily (with meals). 3/29/19   Barbara Herbertx, NP   dutasteride (AVODART) 0.5 mg capsule Take 1 Cap by mouth daily. 3/29/19   Barbara Kowalski, NP   ergocalciferol (ERGOCALCIFEROL) 50,000 unit capsule Take 1 Cap by mouth every seven (7) days. Indications: Vitamin D Deficiency (High Dose Therapy) 3/29/19   Barbara Kowalski NP   olmesartan (BENICAR) 40 mg tablet Take 1 Tab by mouth daily. Indications: chronic heart failure, high blood pressure 3/29/19   Barbara Kowalski NP   atorvastatin (LIPITOR) 40 mg tablet Take 40 mg by mouth every evening. Provider, Historical   aspirin delayed-release 81 mg tablet Take 81 mg by mouth daily. 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     HX HEENT TONSILLECTOMY    HX ORTHOPAEDIC  2008    R KNEE REPLACE    HX ORTHOPAEDIC  2004    ARTHROSCOPY R KNEE    HX ORTHOPAEDIC  2002    ARTHROSCOPY L KNEE    HX OTHER SURGICAL      ENDOSCOPY-X2    HX PROSTATECTOMY      HX TURP  11/02/2015    HX UROLOGICAL  11/02/2015    Transurethral resection of bladder tumor    TOTAL KNEE ARTHROPLASTY  2011    LEFT      Social History     Tobacco Use    Smoking status: Never Smoker    Smokeless tobacco: Never Used   Substance Use Topics    Alcohol use:  Yes     Alcohol/week: 2.0 oz     Types: 4 Glasses of wine per week     Comment: 4-5 OZ WINE DAILY    Drug use: No      Family History   Problem Relation Age of Onset    Heart Disease Father     Cancer Mother    Cloretta Rodríguez Problems Neg Hx      Advance Care Planning 8/13/2018   Patient's Healthcare Decision Maker is: Legal Next of Kin   Primary Decision Maker Name Britni Muniz   Primary Decision Maker Phone Number [phone]   Primary Decision Maker Relationship to Patient -   Confirm Advance Directive -   Patient Would Like to Complete Advance Directive -     Test Results:     Clinical Support on 03/11/2019   Component Date Value Ref Range Status    LD 03/11/2019 173  121 - 224 IU/L Final    (LD) Fraction 1 03/11/2019 25  17 - 32 % Final    (LD) Fraction 2 03/11/2019 32  25 - 40 % Final    (LD) Fraction 3 03/11/2019 22  17 - 27 % Final    (LD) Fraction 4 03/11/2019 9  5 - 13 % Final    (LD) Fraction 5 03/11/2019 12  4 - 20 % Final    WBC 03/11/2019 7.2  3.4 - 10.8 x10E3/uL Final    RBC 03/11/2019 3.92* 4.14 - 5.80 x10E6/uL Final    HGB 03/11/2019 11.9* 13.0 - 17.7 g/dL Final    HCT 03/11/2019 35.4* 37.5 - 51.0 % Final    MCV 03/11/2019 90  79 - 97 fL Final    MCH 03/11/2019 30.4  26.6 - 33.0 pg Final    MCHC 03/11/2019 33.6  31.5 - 35.7 g/dL Final    RDW 03/11/2019 13.6  12.3 - 15.4 % Final    PLATELET 43/46/2885 399  150 - 379 x10E3/uL Final    NEUTROPHILS 03/11/2019 82  Not Estab. % Final    Lymphocytes 03/11/2019 10  Not Estab. % Final    MONOCYTES 03/11/2019 6  Not Estab. % Final    EOSINOPHILS 03/11/2019 2  Not Estab. % Final    BASOPHILS 03/11/2019 0  Not Estab. % Final    ABS. NEUTROPHILS 03/11/2019 5.9  1.4 - 7.0 x10E3/uL Final    Abs Lymphocytes 03/11/2019 0.7  0.7 - 3.1 x10E3/uL Final    ABS. MONOCYTES 03/11/2019 0.4  0.1 - 0.9 x10E3/uL Final    ABS. EOSINOPHILS 03/11/2019 0.2  0.0 - 0.4 x10E3/uL Final    ABS. BASOPHILS 03/11/2019 0.0  0.0 - 0.2 x10E3/uL Final    IMMATURE GRANULOCYTES 03/11/2019 0  Not Estab. % Final    ABS. IMM. GRANS. 03/11/2019 0.0  0.0 - 0.1 x10E3/uL Final    Hemoglobin A1c 03/11/2019 6.5* 4.8 - 5.6 % Final    Comment:          Prediabetes: 5.7 - 6.4           Diabetes: >6.4           Glycemic control for adults with diabetes: <7.0      Estimated average glucose 03/11/2019 140  mg/dL Final    Cholesterol, total 03/11/2019 113  100 - 199 mg/dL Final    Triglyceride 03/11/2019 60  0 - 149 mg/dL Final    HDL Cholesterol 03/11/2019 43  >39 mg/dL Final    VLDL, calculated 03/11/2019 12  5 - 40 mg/dL Final    LDL, calculated 03/11/2019 58  0 - 99 mg/dL Final    Magnesium 03/11/2019 2.0  1.6 - 2.3 mg/dL Final    Glucose 03/11/2019 132* 65 - 99 mg/dL Final    Comment: Specimen received in contact with cells. No visible hemolysis  present. However GLUC may be decreased and K increased. Clinical  correlation indicated.  BUN 03/11/2019 18  10 - 36 mg/dL Final    Creatinine 03/11/2019 0.78  0.76 - 1.27 mg/dL Final    GFR est non-AA 03/11/2019 79  >59 mL/min/1.73 Final    GFR est AA 03/11/2019 91  >59 mL/min/1.73 Final    BUN/Creatinine ratio 03/11/2019 23  10 - 24 Final    Sodium 03/11/2019 141  134 - 144 mmol/L Final    Potassium 03/11/2019 4.3  3.5 - 5.2 mmol/L Final    Comment: Specimen received in contact with cells. No visible hemolysis  present. However GLUC may be decreased and K increased. Clinical  correlation indicated.       Chloride 03/11/2019 105  96 - 106 mmol/L Final    CO2 03/11/2019 19* 20 - 29 mmol/L Final    Calcium 03/11/2019 9.3  8.6 - 10.2 mg/dL Final    Protein, total 03/11/2019 6.1  6.0 - 8.5 g/dL Final    Albumin 03/11/2019 3.9  3.2 - 4.6 g/dL Final    GLOBULIN, TOTAL 03/11/2019 2.2  1.5 - 4.5 g/dL Final    A-G Ratio 03/11/2019 1.8  1.2 - 2.2 Final    Bilirubin, total 03/11/2019 0.5  0.0 - 1.2 mg/dL Final    Alk. phosphatase 03/11/2019 101  39 - 117 IU/L Final    AST (SGOT) 03/11/2019 17  0 - 40 IU/L Final    ALT (SGPT) 03/11/2019 11  0 - 44 IU/L Final    PROBNP 03/11/2019 585* 0 - 486 pg/mL Final    Comment: The following cut-points have been suggested for the  use of proBNP for the diagnostic evaluation of heart  failure (HF) in patients with acute dyspnea:  Modality                     Age           Optimal Cut                             (years)            Point  ------------------------------------------------------  Diagnosis (rule in HF)        <50            450 pg/mL                            50 - 75            900 pg/mL                                >75           1800 pg/mL  Exclusion (rule out HF)  Age independent     300 pg/mL      Prostate Specific Ag 03/11/2019 0.7  0.0 - 4.0 ng/mL Final    Comment: Roche ECLIA methodology. According to the American Urological Association, Serum PSA should  decrease and remain at undetectable levels after radical  prostatectomy. The AUA defines biochemical recurrence as an initial  PSA value 0.2 ng/mL or greater followed by a subsequent confirmatory  PSA value 0.2 ng/mL or greater. Values obtained with different assay methods or kits cannot be used  interchangeably. Results cannot be interpreted as absolute evidence  of the presence or absence of malignant disease.  Reflex Criteria 03/11/2019 Comment   Final    Comment: The percent free PSA is performed on a reflex basis only when the  total PSA is between 4.0 and 10.0 ng/mL.       TSH 03/11/2019 2.770  0.450 - 4.500 uIU/mL Final    T4, Total 03/11/2019 7.3  4.5 - 12.0 ug/dL Final    T3 Uptake 03/11/2019 29  24 - 39 % Final    Free Thyroxine Index (FTI) 03/11/2019 2.1  1.2 - 4.9 Final    Vitamin B12 03/11/2019 554  232 - 1,245 pg/mL Final    Folate 03/11/2019 18.8  >3.0 ng/mL Final    Comment: A serum folate concentration of less than 3.1 ng/mL is  considered to represent clinical deficiency.  VITAMIN D, 25-HYDROXY 03/11/2019 25.6* 30.0 - 100.0 ng/mL Final    Comment: Vitamin D deficiency has been defined by the [address] [place] [address] practice guideline as a  level of serum 25-OH vitamin D less than 20 ng/mL (1,2). The Endocrine Society went on to further define vitamin D  insufficiency as a level between 21 and 29 ng/mL (2). 1. IOM ([place] of Medicine). 2010. Dietary reference     intakes for calcium and D. [address] [facility]: The     Synetiq. 2. Jaelyn MF, Marisol KAPLAN, Vidya LUA, et al.     Evaluation, treatment, and prevention of vitamin D     deficiency: an Endocrine Society clinical practice     guideline. JCEM. 2011 Jul; 96(7):1911-30. Objective: There were no vitals filed for this visit. Wt Readings from Last 3 Encounters:   05/24/19 122 lb (55.3 kg)   05/17/19 123 lb (55.8 kg)   05/13/19 120 lb (54.4 kg)     BP Readings from Last 3 Encounters:   05/13/19 118/64   03/29/19 131/70   09/04/18 119/66     Review of Systems   Constitutional: Positive for appetite change and fatigue. HENT: Negative. Respiratory: Negative. Cardiovascular: Negative. Gastrointestinal: Negative. Endocrine: Negative. Genitourinary: Negative. Neurological: Negative. Physical Exam   Constitutional: He is oriented to person, place, and time. Vital signs are normal. He appears malnourished. He appears cachectic. HENT:   Head: Normocephalic and atraumatic. Neck: Neck supple.    Decreased ROM, mild tenderness   Cardiovascular: Normal rate, regular rhythm and normal heart sounds. Pulmonary/Chest: Effort normal and breath sounds normal.   Abdominal: Soft. Bowel sounds are normal.   Neurological: He is alert and oriented to person, place, and time. Skin: Skin is warm and dry. Psychiatric: Mood and affect normal.        Disclaimer:   Mr. Lary Chapman has been advised to call or return to our office if symptoms worsen/change/persist. We as a care team including the patient; discussed expected course/resolution/complications of diagnosis in detail today. Medication risks/benefits/costs/interactions/alternatives discussed Daniel Craft was given an after visit summary which includes diagnoses, current medications, & vitals. Daniel Craft expressed understanding with the diagnosis and plan.

## 2019-05-31 ENCOUNTER — OFFICE VISIT (OUTPATIENT)
Dept: GERIATRIC MEDICINE | Age: 84
End: 2019-05-31

## 2019-05-31 VITALS
HEART RATE: 78 BPM | DIASTOLIC BLOOD PRESSURE: 68 MMHG | BODY MASS INDEX: 18.41 KG/M2 | OXYGEN SATURATION: 98 % | WEIGHT: 121.5 LBS | RESPIRATION RATE: 20 BRPM | HEIGHT: 68 IN | SYSTOLIC BLOOD PRESSURE: 132 MMHG

## 2019-05-31 DIAGNOSIS — C67.4 MALIGNANT NEOPLASM OF POSTERIOR WALL OF URINARY BLADDER (HCC): ICD-10-CM

## 2019-05-31 DIAGNOSIS — Z71.2 ENCOUNTER TO DISCUSS TEST RESULTS: ICD-10-CM

## 2019-05-31 DIAGNOSIS — E55.9 VITAMIN D DEFICIENCY: ICD-10-CM

## 2019-05-31 DIAGNOSIS — R62.7 ADULT FAILURE TO THRIVE: Primary | ICD-10-CM

## 2019-05-31 DIAGNOSIS — R63.4 EXCESSIVE WEIGHT LOSS: ICD-10-CM

## 2019-05-31 DIAGNOSIS — R69 CHRONIC ILLNESS: ICD-10-CM

## 2019-05-31 RX ORDER — ERGOCALCIFEROL 1.25 MG/1
CAPSULE ORAL
Qty: 12 CAP | Refills: 3 | Status: SHIPPED | OUTPATIENT
Start: 2019-05-31

## 2019-05-31 RX ORDER — ERGOCALCIFEROL 1.25 MG/1
50000 CAPSULE ORAL
Qty: 8 CAP | Refills: 3 | Status: SHIPPED | OUTPATIENT
Start: 2019-05-31 | End: 2019-05-31 | Stop reason: SDUPTHER

## 2019-05-31 NOTE — PROGRESS NOTES
ADVISED PATIENT OF THE FOLLOWING HEALTH MAINTAINCE DUE  Health Maintenance Due   Topic Date Due    Pneumococcal 65+ years (2 of 2 - PPSV23) 10/27/2017    MEDICARE YEARLY EXAM  06/12/2019      Chief Complaint   Patient presents with    Weight Management     Patient here for follow up with weight and urine. Shona See still reports no urinary symptoms       1. Have you been to the ER, urgent care clinic since your last visit? Hospitalized since your last visit? No    2. Have you seen or consulted any other health care providers outside of the [address] [place] [address] since your last visit? Include any DEXA scan, mammography  or colon screening. No    3. Do you have an Advance Directive on file? yes    4. Do you have a DNR on file? DNR    Patient is accompanied by self and wife I have received verbal consent from Antonio Darden to discuss any/all medical information while they are present in the room. Advance Care Planning 8/13/2018   Patient's Healthcare Decision Maker is: Legal Next of Kin   Primary Decision Maker Name Viktor Sow   Primary Decision Maker Phone Number [phone]   Primary Decision Maker Relationship to Patient -   Confirm Advance Directive -   Patient Would Like to Complete Advance Directive -         [facility] [address] [place] [address], [facility] [address] AT [facility] [address]  [address] [address]  Phone: [phone] Fax: [phone]        Patient reminded during visit to bring all medication bottles, OTC medications to all appointments.

## 2019-05-31 NOTE — PROGRESS NOTES
Reason for Visit/HPI:    Kasey Garner is a 80 y.o. male patient who presents today for :  Chief Complaint   Patient presents with    Weight Management     Patient here for follow up with weight and urine. April Cordero still reports no urinary symptoms     Follow Up: Follow-up and Dispositions    · Return in about 3 weeks (around 6/21/2019) for to completed your annual Medicare Wellness Visit. Diagnosis/Treatment Plan:    Diagnoses and all orders for this visit: Mr. Jayson Paulson is here today with his wife for follow up from last week's visit with my colleague for a suspected uti that his daughter requested he be evaluated for. He was negative in the office for infection. He returns for discussion and prognosis of his failure to thrive. He has been taking the medication as directed and eating all the time. He has lost a 1/2 of a lb. We are not maintaining. Discussed with him and his wife the possibility of trying a respite in [place] for reconditioning. They are not interested in this at this time. Discussed that we are not progressing and need to be mindful of continuing to push with a resolution to the issue. He has lost over 35# in 4 months and I am having a difficult time even maintaining his weight with 2 appetites stimulants. I have discussed with he and his wife that his chronic illnesses are managing his stamina, fatigue, and function. I have been honest with them to please talk to their daughters as I do not have a fix or a resolution to his illnesses. He is going to continue to come weekly for now to do a weight check. I think it is harmless and gets him out of the apartment at least. I will continue to follow up as needed and advised he and his wife to return as needed. 1. Adult failure to thrive    2. Excessive weight loss    3. Chronic illness    4. Malignant neoplasm of posterior wall of urinary bladder (HCC)    5. Vitamin D deficiency  Comments:  He is currenty deficient on his Vitamin D.  I will order the once weekly supplement. 6. Encounter to discuss test results  Comments:  Reviewed labs. Working on Vitamin D, sugar, & weight gain. Discontinued Care: The following treatment modalities have been discontinued by the provider today:   Medications Discontinued During This Encounter   Medication Reason    ergocalciferol (ERGOCALCIFEROL) 50,000 unit capsule Reorder     Subjective: Allergies   Allergen Reactions    Ace Inhibitors Hives    Lisinopril Hives     HIVES, ORAL SWELLING     Prior to Admission medications    Medication Sig Start Date End Date Taking? Authorizing Provider   megestrol (MEGACE) 400 mg/10 mL (40 mg/mL) suspension Take 10 mls by mouth 4 x (four) daily. 5/13/19  Yes Goldy Leiva NP   mirtazapine (REMERON) 7.5 mg tablet Take 1 Tab by mouth nightly for 7 days, THEN 2 Tabs nightly for 21 days. 5/13/19 6/10/19 Yes Goldy Leiva NP   carvedilol (COREG) 3.125 mg tablet Take 1 Tab by mouth two (2) times daily (with meals). 3/29/19  Yes Goldy Leiva NP   dutasteride (AVODART) 0.5 mg capsule Take 1 Cap by mouth daily. 3/29/19  Yes Goldy Leiva NP   olmesartan (BENICAR) 40 mg tablet Take 1 Tab by mouth daily. Indications: chronic heart failure, high blood pressure 3/29/19  Yes Goldy Leiva NP   atorvastatin (LIPITOR) 40 mg tablet Take 40 mg by mouth every evening. Yes Provider, Historical   aspirin delayed-release 81 mg tablet Take 81 mg by mouth daily.    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 angina pectoris 11/10/2017    Benign prostatic hyperplasia without lower urinary tract symptoms 11/10/2017    CAD (coronary artery disease)     HX CABG    Cancer ([facility] [address]     BLADDER    Carcinoma of lateral wall of urinary bladder ([address]     Chronic pain     Essential (primary) hypertension 11/10/2017    Essential hypertension 6/8/2018    GERD (gastroesophageal reflux disease)     History of colonoscopy 05/22/2014    Hypercholesterolemia     Hyperlipemia 11/10/2017    Microscopic hematuria     Osteoarthritis of left knee 2/1/2011    Other ill-defined conditions(799.89)     BPH     Past Surgical History:   Procedure Laterality Date    CARDIAC SURG PROCEDURE UNLIST  1999    CABG    HX CATARACT REMOVAL Bilateral 07/19/2016    HX COLONOSCOPY      HX GI      ESOPH DIL.  HX HEENT      TONSILLECTOMY    HX ORTHOPAEDIC  2008    R KNEE REPLACE    HX ORTHOPAEDIC  2004    ARTHROSCOPY R KNEE    HX ORTHOPAEDIC  2002    ARTHROSCOPY L KNEE    HX OTHER SURGICAL      ENDOSCOPY-X2    HX PROSTATECTOMY      HX TURP  11/02/2015    HX UROLOGICAL  11/02/2015    Transurethral resection of bladder tumor    TOTAL KNEE ARTHROPLASTY  2011    LEFT      Social History     Tobacco Use    Smoking status: Never Smoker    Smokeless tobacco: Never Used   Substance Use Topics    Alcohol use:  Yes     Alcohol/week: 2.0 oz     Types: 4 Glasses of wine per week     Comment: 4-5 OZ WINE DAILY    Drug use: No      Family History   Problem Relation Age of Onset    Heart Disease Father     Cancer Mother    Blease Cliche Problems Neg Hx      Advance Care Planning 8/13/2018   Patient's Healthcare Decision Maker is: Legal Next of Dary 69   Primary Decision Maker Name Tawanna Islas   Primary Decision Maker Phone Number [phone]   Primary Decision Maker Relationship to Patient -   Confirm Advance Directive -   Patient Would Like to Complete Advance Directive -     Test Results:     Office Visit on 05/24/2019   Component Date Value Ref Range Status    Color (UA POC) 05/24/2019 Yellow   Final    Clarity (UA POC) 05/24/2019 Clear   Final    Glucose (UA POC) 05/24/2019 Negative  Negative Final    Bilirubin (UA POC) 05/24/2019 Negative  Negative Final    Ketones (UA POC) 05/24/2019 Negative  Negative Final    Specific gravity (UA POC) 05/24/2019 1.005  1.001 - 1.035 Final    Blood (UA POC) 05/24/2019 3+  Negative Final    pH (UA POC) 05/24/2019 5.0  4.6 - 8.0 Final    Protein (UA POC) 05/24/2019 Positive  Negative Final    Urobilinogen (UA POC) 05/24/2019 normal  0.2 - 1 Final    Nitrites (UA POC) 05/24/2019 Negative  Negative Final    Leukocyte esterase (UA POC) 05/24/2019 3+  Negative Final   Clinical Support on 03/11/2019   Component Date Value Ref Range Status    LD 03/11/2019 173  121 - 224 IU/L Final    (LD) Fraction 1 03/11/2019 25  17 - 32 % Final    (LD) Fraction 2 03/11/2019 32  25 - 40 % Final    (LD) Fraction 3 03/11/2019 22  17 - 27 % Final    (LD) Fraction 4 03/11/2019 9  5 - 13 % Final    (LD) Fraction 5 03/11/2019 12  4 - 20 % Final    WBC 03/11/2019 7.2  3.4 - 10.8 x10E3/uL Final    RBC 03/11/2019 3.92* 4.14 - 5.80 x10E6/uL Final    HGB 03/11/2019 11.9* 13.0 - 17.7 g/dL Final    HCT 03/11/2019 35.4* 37.5 - 51.0 % Final    MCV 03/11/2019 90  79 - 97 fL Final    MCH 03/11/2019 30.4  26.6 - 33.0 pg Final    MCHC 03/11/2019 33.6  31.5 - 35.7 g/dL Final    RDW 03/11/2019 13.6  12.3 - 15.4 % Final    PLATELET 30/49/3668 937  150 - 379 x10E3/uL Final    NEUTROPHILS 03/11/2019 82  Not Estab. % Final    Lymphocytes 03/11/2019 10  Not Estab. % Final    MONOCYTES 03/11/2019 6  Not Estab. % Final    EOSINOPHILS 03/11/2019 2  Not Estab. % Final    BASOPHILS 03/11/2019 0  Not Estab. % Final    ABS. NEUTROPHILS 03/11/2019 5.9  1.4 - 7.0 x10E3/uL Final    Abs Lymphocytes 03/11/2019 0.7  0.7 - 3.1 x10E3/uL Final    ABS. MONOCYTES 03/11/2019 0.4  0.1 - 0.9 x10E3/uL Final    ABS. EOSINOPHILS 03/11/2019 0.2  0.0 - 0.4 x10E3/uL Final    ABS. BASOPHILS 03/11/2019 0.0  0.0 - 0.2 x10E3/uL Final    IMMATURE GRANULOCYTES 03/11/2019 0  Not Estab. % Final    ABS. IMM.  GRANS. 03/11/2019 0.0  0.0 - 0.1 x10E3/uL Final    Hemoglobin A1c 03/11/2019 6.5* 4.8 - 5.6 % Final    Comment:          Prediabetes: 5.7 - 6.4           Diabetes: >6.4           Glycemic control for adults with diabetes: <7.0      Estimated average glucose 03/11/2019 140  mg/dL Final    Cholesterol, total 03/11/2019 113  100 - 199 mg/dL Final    Triglyceride 03/11/2019 60  0 - 149 mg/dL Final    HDL Cholesterol 03/11/2019 43  >39 mg/dL Final    VLDL, calculated 03/11/2019 12  5 - 40 mg/dL Final    LDL, calculated 03/11/2019 58  0 - 99 mg/dL Final    Magnesium 03/11/2019 2.0  1.6 - 2.3 mg/dL Final    Glucose 03/11/2019 132* 65 - 99 mg/dL Final    Comment: Specimen received in contact with cells. No visible hemolysis  present. However GLUC may be decreased and K increased. Clinical  correlation indicated.  BUN 03/11/2019 18  10 - 36 mg/dL Final    Creatinine 03/11/2019 0.78  0.76 - 1.27 mg/dL Final    GFR est non-AA 03/11/2019 79  >59 mL/min/1.73 Final    GFR est AA 03/11/2019 91  >59 mL/min/1.73 Final    BUN/Creatinine ratio 03/11/2019 23  10 - 24 Final    Sodium 03/11/2019 141  134 - 144 mmol/L Final    Potassium 03/11/2019 4.3  3.5 - 5.2 mmol/L Final    Comment: Specimen received in contact with cells. No visible hemolysis  present. However GLUC may be decreased and K increased. Clinical  correlation indicated.  Chloride 03/11/2019 105  96 - 106 mmol/L Final    CO2 03/11/2019 19* 20 - 29 mmol/L Final    Calcium 03/11/2019 9.3  8.6 - 10.2 mg/dL Final    Protein, total 03/11/2019 6.1  6.0 - 8.5 g/dL Final    Albumin 03/11/2019 3.9  3.2 - 4.6 g/dL Final    GLOBULIN, TOTAL 03/11/2019 2.2  1.5 - 4.5 g/dL Final    A-G Ratio 03/11/2019 1.8  1.2 - 2.2 Final    Bilirubin, total 03/11/2019 0.5  0.0 - 1.2 mg/dL Final    Alk.  phosphatase 03/11/2019 101  39 - 117 IU/L Final    AST (SGOT) 03/11/2019 17  0 - 40 IU/L Final    ALT (SGPT) 03/11/2019 11  0 - 44 IU/L Final    PROBNP 03/11/2019 585* 0 - 486 pg/mL Final    Comment: The following cut-points have been suggested for the  use of proBNP for the diagnostic evaluation of heart  failure (HF) in patients with acute dyspnea:  Modality                     Age           Optimal Cut                             (years)            Point  ------------------------------------------------------  Diagnosis (rule in HF)        <50            450 pg/mL                            50 - 75            900 pg/mL                                >75           1800 pg/mL  Exclusion (rule out HF)  Age independent     300 pg/mL      Prostate Specific Ag 03/11/2019 0.7  0.0 - 4.0 ng/mL Final    Comment: Roche ECLIA methodology. According to the American Urological Association, Serum PSA should  decrease and remain at undetectable levels after radical  prostatectomy. The AUA defines biochemical recurrence as an initial  PSA value 0.2 ng/mL or greater followed by a subsequent confirmatory  PSA value 0.2 ng/mL or greater. Values obtained with different assay methods or kits cannot be used  interchangeably. Results cannot be interpreted as absolute evidence  of the presence or absence of malignant disease.  Reflex Criteria 03/11/2019 Comment   Final    Comment: The percent free PSA is performed on a reflex basis only when the  total PSA is between 4.0 and 10.0 ng/mL.  TSH 03/11/2019 2.770  0.450 - 4.500 uIU/mL Final    T4, Total 03/11/2019 7.3  4.5 - 12.0 ug/dL Final    T3 Uptake 03/11/2019 29  24 - 39 % Final    Free Thyroxine Index (FTI) 03/11/2019 2.1  1.2 - 4.9 Final    Vitamin B12 03/11/2019 554  232 - 1,245 pg/mL Final    Folate 03/11/2019 18.8  >3.0 ng/mL Final    Comment: A serum folate concentration of less than 3.1 ng/mL is  considered to represent clinical deficiency.       VITAMIN D, 25-HYDROXY 03/11/2019 25.6* 30.0 - 100.0 ng/mL Final    Comment: Vitamin D deficiency has been defined by the [address] practice guideline as a  level of serum 25-OH vitamin D less than 20 ng/mL (1,2).  The Endocrine Society went on to further define vitamin D  insufficiency as a level between 21 and 29 ng/mL (2). 1. IOM ([place] of [employer]). 2010. Dietary reference     intakes for calcium and D. [facility]: [place]. 2. Jaelyn MF, Marisol KAPLAN, Vidya LUA, et al.     Evaluation, treatment, and prevention of vitamin D     deficiency: an Endocrine Society clinical practice     guideline. JCEM. 2011 Jul; 96(2):1911-30. Objective:     Vitals:    05/31/19 1412   BP: 132/68   Pulse: 78   Resp: 20   SpO2: 98%   Weight: 121 lb 8 oz (55.1 kg)   Height: 5' 8\" (1.727 m)     Wt Readings from Last 3 Encounters:   05/31/19 121 lb 8 oz (55.1 kg)   05/24/19 122 lb (55.3 kg)   05/24/19 122 lb (55.3 kg)     BP Readings from Last 3 Encounters:   05/31/19 132/68   05/13/19 118/64   03/29/19 131/70     Review of Systems   Constitutional: Positive for activity change, appetite change, fatigue and unexpected weight change. Negative for chills and fever. HENT: Negative for congestion, dental problem, hearing loss, postnasal drip, sinus pressure, sneezing, sore throat and trouble swallowing. Eyes: Negative for discharge, redness and visual disturbance. Respiratory: Positive for shortness of breath. Negative for apnea, cough, chest tightness and wheezing. Cardiovascular: Negative for chest pain, palpitations and leg swelling. Gastrointestinal: Negative for abdominal distention, abdominal pain, blood in stool, constipation, diarrhea, nausea and vomiting. Endocrine: Negative for polydipsia, polyphagia and polyuria. Genitourinary: Negative for flank pain, frequency and urgency. Musculoskeletal: Positive for arthralgias, back pain, gait problem, joint swelling, myalgias, neck pain and neck stiffness. Skin: Negative for color change, pallor, rash and wound. Allergic/Immunologic: Positive for environmental allergies. Negative for food allergies.    Neurological: Positive for weakness. Negative for dizziness, tremors, light-headedness, numbness and headaches. Hematological: Negative for adenopathy. Psychiatric/Behavioral: Positive for confusion. Negative for agitation and sleep disturbance. The patient is not nervous/anxious. Physical Exam   Constitutional: Vital signs are normal. He appears malnourished and dehydrated. He appears to not be writhing in pain. He appears unhealthy. He appears cachectic. He appears toxic. He has a sickly appearance. No distress. Thin, frail, fragile in appearing, elderly,  male. He is here today with his wife for review of lab results. HENT:   Head: Normocephalic and atraumatic. Right Ear: External ear and ear canal normal. A middle ear effusion is present. Decreased hearing is noted. Left Ear: External ear and ear canal normal. A middle ear effusion is present. Decreased hearing is noted. Nose: Mucosal edema and rhinorrhea present. Mouth/Throat: Uvula is midline. Mucous membranes are not pale, dry and not cyanotic. No oral lesions. No uvula swelling. Posterior oropharyngeal erythema present. No posterior oropharyngeal edema. Tenting of skin with moderate tongue furrowing present    Eyes: Pupils are equal, round, and reactive to light. Conjunctivae, EOM and lids are normal. Lids are everted and swept, no foreign bodies found. Neck: Trachea normal. No JVD present. Spinous process tenderness and muscular tenderness present. Neck rigidity present. Decreased range of motion present. No thyromegaly present. Cardiovascular: S1 normal, S2 normal, intact distal pulses and normal pulses. An irregularly irregular rhythm present. Bradycardia present. Exam reveals distant heart sounds. Exam reveals no gallop and no friction rub. No murmur heard. No extremity edema is present during today's exam.    Pulmonary/Chest: Effort normal and breath sounds normal.   Upper airway congestion.  Seems to be seasonal allergies as his eyes are watering as well and throat has drainage from his nose. Abdominal: Soft. Normal appearance and normal aorta. Bowel sounds are tinkling. There is no hepatosplenomegaly. There is no tenderness. There is no rigidity, no guarding and no CVA tenderness. He reports no appetitive as well as increased weight loss. Genitourinary: Rectum normal, testes/scrotum normal and penis normal.   Genitourinary Comments: Frequency, urgency, constant urge to urinate. Pain in lower abdomen that radiates across from the right flank to the left inguinal area when lying down. Musculoskeletal:        Right shoulder: He exhibits decreased range of motion, swelling, effusion, crepitus, deformity, pain, abnormal pulse and decreased strength. Left shoulder: He exhibits decreased range of motion, tenderness, bony tenderness, swelling, crepitus, deformity, pain, spasm and decreased strength. Generalized chronic extremity weakness is present, he is using a cane to ambulate. He is weak in all extremities and has no range of motion in either right or left shoulder. He states this is chronic. Lymphadenopathy:        Head (right side): No submandibular, no tonsillar and no posterior auricular adenopathy present. Head (left side): No submandibular, no tonsillar and no posterior auricular adenopathy present. He has no cervical adenopathy. Right: Inguinal adenopathy present. Palpable right inguinal swelling mid inguinal region. Round, hard, attached. No pain with palpation. CT results noted the lymph node to be 4 cm x 2 cm. Neurological: He is alert. He has normal reflexes and intact cranial nerves. He is disoriented. He displays weakness, atrophy, abnormal stance and abnormal speech. A sensory deficit is present. He exhibits abnormal muscle tone. Coordination and gait abnormal. GCS score is 15. Skin: Skin is warm, dry and intact. Bruising noted. No rash noted. He is not diaphoretic. No cyanosis.  There is pallor. Nails show clubbing. His skin is gray in color. He is malnourished in appearance. His hydration has improved slightly over the weekend and currently he is more up beat then Friday. His daughter and wife are here today and state he is having a harder time with medication administration problem solving. Psychiatric: Mood and memory normal. His affect is blunt and inappropriate. He expresses impulsivity. He exhibits disordered thought content. Baseline mood and affect unchanged from normal.         Disclaimer:   Mr. Rome Brand has been advised to call or return to our office if symptoms worsen/change/persist. We as a care team including the patient; discussed expected course/resolution/complications of diagnosis in detail today. Medication risks/benefits/costs/interactions/alternatives discussed Daniel Craft was given an after visit summary which includes diagnoses, current medications, & vitals. Daniel Craft expressed understanding with the diagnosis and plan.

## 2019-05-31 NOTE — PATIENT INSTRUCTIONS
Abnormal Weight Loss: Care Instructions  Your Care Instructions    There are two types of weight loss--normal and abnormal. The normal kind happens when you are trying to lose weight by exercising more or eating less. The abnormal kind happens when you are not trying to lose weight. Many medical problems can cause abnormal weight loss. These include problems with your thyroid gland, long-term infections, mouth or throat problems that make it hard to eat, and digestive problems. They also include depression and cancer. Some medicines also may cause you to lose weight. You can work with your doctor to find the cause of your weight loss. You will probably need tests to do this. Follow-up care is a key part of your treatment and safety. Be sure to make and go to all appointments, and call your doctor if you are having problems. It's also a good idea to know your test results and keep a list of the medicines you take. How can you care for yourself at home? · Weigh yourself at the same time every day. It's best to do it first thing in the morning after you empty your bladder. Be sure to always wear the same amount of clothing. · Write down any changes in your weight and the possible causes. Discuss these with your doctor. · Your doctor may want you to change your diet. Do your best to follow his or her advice. · Ask your doctor if you should see a dietitian. This is a person who can help you plan meals that work best for your lifestyle. · Note any changes in bowel habits. These may include changes in how often you have a bowel movement. Other changes include the color and size of your stools and how solid they are. · If you are prescribed medicines, take them exactly as prescribed. Call your doctor if you think you are having a problem with your medicine. You will get more details on the specific medicines your doctor prescribes. When should you call for help?   Watch closely for changes in your health, and be sure to contact your doctor if:    · You do not get better as expected.     · You continue to lose weight. Where can you learn more? Go to http://thomas-jason.info/. Enter A790 in the search box to learn more about \"Abnormal Weight Loss: Care Instructions. \"  Current as of: June 25, 2018  Content Version: 11.9  © 0574-6819 [employer]. Care instructions adapted under license by [employer] (which disclaims liability or warranty for this information). If you have questions about a medical condition or this instruction, always ask your healthcare professional. [place] [address] any warranty or liability for your use of this information. Coping With an Acute Illness: Care Instructions  Your Care Instructions    Finding out that you have a sudden (acute) illness can be very hard. Getting sick can be scary. It also can be very expensive and can disrupt your life. The most important thing is to deal with the illness and get healthy again. If work, school, or other activities have to be put on hold while you get treatment, that is okay. Do what you have to do to get better, and then you can focus on the rest of your life. Some acute illnesses can become chronic, which means that they last for a long time. Although this may not happen with you, it is best to be prepared for this and to know how to handle it. Talk to your doctor to find out as much as you can about the illness and how best to treat it. Follow-up care is a key part of your treatment and safety. Be sure to make and go to all appointments, and call your doctor if you are having problems. It's also a good idea to know your test results and keep a list of the medicines you take. How can you care for yourself at home? Treating the illness  · Take your medicines exactly as prescribed. Call your doctor if you think you are having a problem with your medicine.  You will get more details on the specific medicines your doctor prescribes. · Take pain medicines exactly as directed. ? If the doctor gave you a prescription medicine for pain, take it as prescribed. ? If you are not taking a prescription pain medicine, ask your doctor if you can use an over-the-counter medicine. · Tell your doctor about any over-the-counter medicines you take. These medicines can cause problems when taken with other medicines. · Keep in touch with your doctor. The more informed you keep your doctor, the better he or she will be able to care for you. · Get plenty of rest. Talk with your doctor if you have trouble sleeping because of pain. · Eat a balanced diet. Talk with your doctor about what type of diet may be best.  · Do not smoke. Smoking or being around smoke can make the condition worse. If you need help quitting, talk to your doctor about stop-smoking programs and medicines. These can increase your chances of quitting for good. Keeping your life in order  Here are some tips on how to keep your life on track while you get better. · Talk with your insurance provider to make sure treatments are covered. · Make sure your employer knows about the illness. Get work commitments taken care of or postponed. This will give you the time and energy you need to treat the illness. · Keep your employer informed about when you will be able to return. · Ask for help from friends and family to keep your home in order. Keep your surroundings clean and in good shape to help reduce stress. Get help so you can save your energy for treating the illness. Questions to ask  · Can the illness be cured? Can the illness become long-lasting (chronic)? · How will my lifestyle change once the illness is gone? · Does the illness run in families? · What types of treatment are available? Which treatment has the best success rate? · Are there any side effects? · What are the best and worst possible results of the treatment?   When should you call for help? Watch closely for changes in your health, and be sure to contact your doctor if:    · You do not get better as expected. Where can you learn more? Go to http://thomas-jason.info/. Enter [id number] in the search box to learn more about \"Coping With an Acute Illness: Care Instructions. \"  Current as of: June 28, 2018  Content Version: 11.9  © 6730-4423 [employer]. Care instructions adapted under license by [employer] (which disclaims liability or warranty for this information). If you have questions about a medical condition or this instruction, always ask your healthcare professional. [address] any warranty or liability for your use of this information.

## 2019-06-01 NOTE — PATIENT INSTRUCTIONS
Learning About Saving Energy When You Have a Chronic Condition  Introduction    Everyday tasks can be tiring when you have COPD, heart failure, or another long-term (chronic) condition. You may feel at times that you've lost your ability to live your life. But learning to conserve, or save, your energy can help you be less tired. Conserving your energy means finding ways of doing daily activities with as little effort as possible. With some small changes in the way you do things, you can get your tasks done more easily. Some treatments are available that might help. Pulmonary rehabilitation can teach you ways to breathe easier. Cardiac rehabilitation can help make your heart stronger. You also may want to see an occupational or physical therapist. The therapist can give you more tips on building strength and moving with less effort. What can you do to conserve your energy? Planning  · Make a list of what you have to do every day. Group the tasks by location. · Do all the chores in one part of your house around the same time. · Go out for errands or do chores at the time of day when you have the most energy. · Plan rest periods into your day. Getting things done  · Sit down as often as you can when you get dressed, do chores, or cook. · Use a cart with wheels to roll items, such as laundry, from one room to another. · Push or slide boxes or other large items instead of lifting them. Reaching and bending  · Put things you use the most on shelves that are at the level of your waist or shoulder. · Use long-handled grabbers or other tools to reach items on a high shelf or to  things off the floor. Use long-handled dusters when you clean the house. · Use a raised toilet seat to avoid bending too far to sit or stand up. Eating  · Eat several small meals instead of three larger meals. · If you get too tired to eat much, try to choose healthy foods that have more calories.  Have a yogurt-and-fruit smoothie for breakfast. Put avocado on a sandwich. Or add cheese or peanut butter to snacks. · If you don't feel very hungry, try to eat first and drink water or other fluids later, after a meal. This can help keep you from losing weight. Sip small amounts of fluids if you need to drink while you eat. Having sex  · Choose the time of day when you have more energy. · A [id number]-fw-[id number] position for sex can be less tiring. Sometimes you may want to focus more on caressing. Watch closely for changes in your health, and be sure to contact your doctor if you have any problems. Where can you learn more? Go to http://thomas-jason.info/. Enter H190 in the search box to learn more about \"Learning About Saving Energy When You Have a Chronic Condition. \"  Current as of: September 5, 2018  Content Version: 11.9  © 7693-8570 [employer]. Care instructions adapted under license by [employer] (which disclaims liability or warranty for this information). If you have questions about a medical condition or this instruction, always ask your healthcare professional. [place] [address] any warranty or liability for your use of this information.

## 2019-06-04 ENCOUNTER — TELEPHONE (OUTPATIENT)
Dept: GERIATRIC MEDICINE | Age: 84
End: 2019-06-04

## 2019-06-04 DIAGNOSIS — R69 CHRONIC ILLNESS: ICD-10-CM

## 2019-06-04 DIAGNOSIS — Z51.5 PATIENT ON FULL HOSPICE CARE: ICD-10-CM

## 2019-06-04 DIAGNOSIS — R62.7 ADULT FAILURE TO THRIVE: Primary | ICD-10-CM

## 2019-06-04 NOTE — TELEPHONE ENCOUNTER
Ms. Diony Todd @ [phone] called  stated that she thinks Mr. Cristobal Dominguez is dehydrated. She wanted to see if Scott Patrick can see him this afternoon. Please let her Know.  Tnx.

## 2019-06-05 ENCOUNTER — OFFICE VISIT (OUTPATIENT)
Dept: GERIATRIC MEDICINE | Age: 84
End: 2019-06-05

## 2019-06-05 VITALS
RESPIRATION RATE: 18 BRPM | OXYGEN SATURATION: 97 % | DIASTOLIC BLOOD PRESSURE: 66 MMHG | WEIGHT: 122 LBS | SYSTOLIC BLOOD PRESSURE: 134 MMHG | HEART RATE: 78 BPM | HEIGHT: 68 IN | BODY MASS INDEX: 18.49 KG/M2

## 2019-06-05 DIAGNOSIS — R63.4 EXCESSIVE WEIGHT LOSS: ICD-10-CM

## 2019-06-05 DIAGNOSIS — E55.9 VITAMIN D DEFICIENCY: ICD-10-CM

## 2019-06-05 DIAGNOSIS — R06.02 SHORTNESS OF BREATH: ICD-10-CM

## 2019-06-05 DIAGNOSIS — R62.7 ADULT FAILURE TO THRIVE: Primary | ICD-10-CM

## 2019-06-05 DIAGNOSIS — R53.1 WEAKNESS GENERALIZED: ICD-10-CM

## 2019-06-05 DIAGNOSIS — R53.83 EXCESSIVE POSTEXERTIONAL FATIGUE: ICD-10-CM

## 2019-06-05 DIAGNOSIS — R69 CHRONIC ILLNESS: ICD-10-CM

## 2019-06-05 DIAGNOSIS — E53.8 VITAMIN B 12 DEFICIENCY: ICD-10-CM

## 2019-06-05 DIAGNOSIS — R64 CACHEXIA (HCC): ICD-10-CM

## 2019-06-05 LAB — GLUCOSE POC: 126 MG/DL

## 2019-06-05 RX ORDER — DEXAMETHASONE 4 MG/1
4 TABLET ORAL 2 TIMES DAILY WITH MEALS
Qty: 60 TAB | Refills: 1 | Status: SHIPPED | OUTPATIENT
Start: 2019-06-05 | End: 2019-06-12

## 2019-06-05 RX ORDER — LANOLIN ALCOHOL/MO/W.PET/CERES
1000 CREAM (GRAM) TOPICAL DAILY
COMMUNITY
End: 2019-06-05 | Stop reason: ALTCHOICE

## 2019-06-05 RX ORDER — SODIUM CHLORIDE 9 MG/ML
150 INJECTION, SOLUTION INTRAVENOUS ONCE
Qty: 1000 ML | Refills: 0
Start: 2019-06-05 | End: 2019-06-05

## 2019-06-05 RX ORDER — DRONABINOL 2.5 MG/1
2.5 CAPSULE ORAL 2 TIMES DAILY
Qty: 60 CAP | Refills: 0 | Status: SHIPPED | OUTPATIENT
Start: 2019-06-05 | End: 2019-06-12 | Stop reason: ALTCHOICE

## 2019-06-05 RX ORDER — CYANOCOBALAMIN 1000 UG/ML
1000 INJECTION, SOLUTION INTRAMUSCULAR; SUBCUTANEOUS ONCE
Qty: 8 VIAL | Refills: 1 | Status: SHIPPED | OUTPATIENT
Start: 2019-06-05 | End: 2019-06-05

## 2019-06-05 RX ORDER — CYANOCOBALAMIN 1000 UG/ML
1000 INJECTION, SOLUTION INTRAMUSCULAR; SUBCUTANEOUS ONCE
Qty: 1 ML | Refills: 0 | Status: SHIPPED | COMMUNITY
Start: 2019-06-05 | End: 2019-06-05

## 2019-06-05 NOTE — LETTER
2019 3:10 PM 
 
Mr. Cornell -[phone] [facility] [address] AFTER VISIT INSTRUCTIONS/PLAN OF CARE 19Discontinued Medication/Treatment:     
THE FOLLOWING MEDICATION/TREATMENTS HAVE BEEN STOPPED AFTER YOUR VISIT TODAY! Medications Discontinued During This Encounter Medication Reason  cyanocobalamin 1,000 mcg tablet Alternate Therapy  megestrol (MEGACE) 400 mg/10 mL (40 mg/mL) suspension Alternate Therapy Treatment Plan: 1. Adult failure to thrive 2. Excessive weight loss 3. Chronic illness 4. Cachexia ([address] 5. Weakness generalized 6. Vitamin D deficiency 7. Shortness of breath 8. Excessive postexertional fatigue 9. Vitamin B 12 deficiency Your prescriptions have been electronically sent to the pharmacy you selected:   
[facility] [address] [place] [id number] Phone: [phone] Fax: [phone] If you do not receive your medications it is your responsibility to contact the pharmacy directly. Orders Placed This Encounter  cyanocobalamin (VITAMIN B12) 1,000 mcg/mL injection Si mL by IntraMUSCular route once for 1 dose. Dispense:  8 Vial  
  Refill:  1 Deliver to [facility] - Not to patient  dronabinol (MARINOL) 2.5 mg capsule Sig: Take 1 Cap by mouth two (2) times a day. Max Daily Amount: 5 mg. Dispense:  60 Cap Refill:  0 Deliver to [facility]  dexamethasone (DECADRON) 4 mg tablet Sig: Take 4 mg by mouth two (2) times daily (with meals). Dispense:  60 Tab Refill:  1 Deliver to [facility]  cyanocobalamin (VITAMIN B-12) 1,000 mcg/mL injection Si mL by IntraMUSCular route once for 1 dose. Dispense:  1 mL Refill:  0 Order Specific Question:   Site Answer:   RIGHT ARM Order Specific Question:   Expiration Date Answer:   2020 Order Specific Question:   Lot# Answer:   [id number] Order Specific Question:    Answer:   Debra Castillo Order Specific Question:   NDC# Answer:   [id number] Order Specific Question:   Route Answer:   IM Return Visit/Follow Up: We appreciate you allowing us to participate in your health care. Any questions do not hesitate to call Dustin Thomas [facility] Nurse at [phone].   
 
 
 
 
 
 
 
 
 
 
Sincerely, 
 
 
Ashish Esparza NP

## 2019-06-05 NOTE — PROGRESS NOTES
ADVISED PATIENT OF THE FOLLOWING HEALTH MAINTAINCE DUE  Health Maintenance Due   Topic Date Due    Pneumococcal 65+ years (2 of 2 - PPSV23) 10/27/2017    MEDICARE YEARLY EXAM  06/12/2019      Chief Complaint   Patient presents with    Weight Management     Weight today is up 1 lb.  Fatigue     Patient states he has no get up and go. He started taking B12 tablets. 1. Have you been to the ER, urgent care clinic since your last visit? Hospitalized since your last visit? No    2. Have you seen or consulted any other health care providers outside of the [address] [place] [address] since your last visit? Include any DEXA scan, mammography  or colon screening. No    3. Do you have an Advance Directive on file? yes    4. Do you have a DNR on file? DNR    Patient is accompanied by self I have received verbal consent from Antonio Darden to discuss any/all medical information while they are present in the room. Advance Care Planning 8/13/2018   Patient's Healthcare Decision Maker is: Legal Next of Kin   Primary Decision Maker Name Wyonia Ahumada   Primary Decision Maker Phone Number [phone]   Primary Decision Maker Relationship to Patient -   Confirm Advance Directive -   Patient Would Like to Complete Advance Directive -         [facility] [address] [place] [address], [facility] [address] [facility]  [address] [address]  Phone: [phone] Fax: [phone]        Patient reminded during visit to bring all medication bottles, OTC medications to all appointments. 22 G IV started in left antecubital with blood return labs drawn from IV, Patient resting with eyes closed sleeping in chair. Wife in room with pt. Patient receiving two 500 cc Sodium Chloride 0.9%. Patient checked Q 15 minutes while in room. He was assisted up once to the bathroom. IV removed from left arm, pressure dressing applied.          Antonio Darden who presents for Vitamin B12 1000 mcg/ml. He/She denies any symptoms,reactions or allergies that would exclude them from receiving Injection today. Risk and adverse reactions were discussed. All questions were addressed. Consent was obatined and per orders of Kat Gray RN,MSN,ACNP-AG, injection of Vitamin B12 1000 mcg/ml given by [facility] LPN. Patient observed in clinic for 20 minutes post injection. There were no reactions observed.      Roberto Castaneda presents for lab draw ordered by Kat Gray RN MSN ACN-AG-NP    The following labs were drawn and sent to lab by [facility] LPN:    CMP, ESR (sed rate), BNP, HgA1C and C reactive protein, CA 19, CEA, Anemia profile, VitaminD, VItamin B12 folate, Prealbumin,     The following tubes were sent:    Lavender  ( 3) and Tiger (4)

## 2019-06-05 NOTE — PATIENT INSTRUCTIONS
Fatigue: Care Instructions  Your Care Instructions    Fatigue is a feeling of tiredness, exhaustion, or lack of energy. You may feel fatigue because of too much or not enough activity. It can also come from stress, lack of sleep, boredom, and poor diet. Many medical problems, such as viral infections, can cause fatigue. Emotional problems, especially depression, are often the cause of fatigue. Fatigue is most often a symptom of another problem. Treatment for fatigue depends on the cause. For example, if you have fatigue because you have a certain health problem, treating this problem also treats your fatigue. If depression or anxiety is the cause, treatment may help. Follow-up care is a key part of your treatment and safety. Be sure to make and go to all appointments, and call your doctor if you are having problems. It's also a good idea to know your test results and keep a list of the medicines you take. How can you care for yourself at home? · Get regular exercise. But don't overdo it. Go back and forth between rest and exercise. · Get plenty of rest.  · Eat a healthy diet. Do not skip meals, especially breakfast.  · Reduce your use of caffeine, tobacco, and alcohol. Caffeine is most often found in coffee, tea, cola drinks, and chocolate. · Limit medicines that can cause fatigue. This includes tranquilizers and cold and allergy medicines. When should you call for help? Watch closely for changes in your health, and be sure to contact your doctor if:    · You have new symptoms such as fever or a rash.     · Your fatigue gets worse.     · You have been feeling down, depressed, or hopeless. Or you may have lost interest in things that you usually enjoy.     · You are not getting better as expected. Where can you learn more? Go to http://thomas-jason.info/. Enter J258 in the search box to learn more about \"Fatigue: Care Instructions. \"  Current as of: September 23, 2018  Content Version: 11.9  © 3503-0114 [employer]. Care instructions adapted under license by [employer] (which disclaims liability or warranty for this information). If you have questions about a medical condition or this instruction, always ask your healthcare professional. [address] any warranty or liability for your use of this information. Abnormal Weight Loss: Care Instructions  Your Care Instructions    There are two types of weight loss--normal and abnormal. The normal kind happens when you are trying to lose weight by exercising more or eating less. The abnormal kind happens when you are not trying to lose weight. Many medical problems can cause abnormal weight loss. These include problems with your thyroid gland, long-term infections, mouth or throat problems that make it hard to eat, and digestive problems. They also include depression and cancer. Some medicines also may cause you to lose weight. You can work with your doctor to find the cause of your weight loss. You will probably need tests to do this. Follow-up care is a key part of your treatment and safety. Be sure to make and go to all appointments, and call your doctor if you are having problems. It's also a good idea to know your test results and keep a list of the medicines you take. How can you care for yourself at home? · Weigh yourself at the same time every day. It's best to do it first thing in the morning after you empty your bladder. Be sure to always wear the same amount of clothing. · Write down any changes in your weight and the possible causes. Discuss these with your doctor. · Your doctor may want you to change your diet. Do your best to follow his or her advice. · Ask your doctor if you should see a dietitian. This is a person who can help you plan meals that work best for your lifestyle. · Note any changes in bowel habits.  These may include changes in how often you have a bowel movement. Other changes include the color and size of your stools and how solid they are. · If you are prescribed medicines, take them exactly as prescribed. Call your doctor if you think you are having a problem with your medicine. You will get more details on the specific medicines your doctor prescribes. When should you call for help? Watch closely for changes in your health, and be sure to contact your doctor if:    · You do not get better as expected.     · You continue to lose weight. Where can you learn more? Go to http://thomas-jason.info/. Enter A790 in the search box to learn more about \"Abnormal Weight Loss: Care Instructions. \"  Current as of: June 25, 2018  Content Version: 11.9  © 8575-1007 [employer]. Care instructions adapted under license by [employer] (which disclaims liability or warranty for this information). If you have questions about a medical condition or this instruction, always ask your healthcare professional. [place] [address] any warranty or liability for your use of this information.

## 2019-06-05 NOTE — PROGRESS NOTES
Reason for Visit/HPI:    Milly Rodarte is a 80 y.o. male patient who presents today for :  Chief Complaint   Patient presents with    Weight Management     Weight today is up 1 lb.  Fatigue     Patient states he has no get up and go. He started taking B12 tablets. Follow Up: Follow-up and Dispositions    · Return in about 2 days (around 6/7/2019) for with the NP for follow up to your treatment plan. Diagnosis/Treatment Plan:    Diagnoses and all orders for this visit:    Prolonged face to face time documentation:     Start - 11:35   Stop - 15:30  Total Face to Face time = 240 minutes        Prolonged face to face time necessary for complex assessment of current acute complaints due to geriatric medicine, patient's fragility, frailness, distress, necessary education for new/advanced disease process. Face to Face time with patient reviewing consult notes and plan of care. Review of treatment plan related to systemic illness. 1. Adult failure to thrive- I sent the family the following as well as this is the plan of care. Your fathers hydration is adequate but it could stand some support. I am giving him some IV fluids in the office and I ordered a B12 injection in his muscle. He can have them once weekly for now. He has done well today in the office as he is here eating an entire grill cheese and fruit cup. Your mom seems concentrating on the idea he is not going to eat but he is overcome with interest in eating lunch today. I consulted a colleague in palliative care for some suggestions on how to try and boost his energy, increase his muscle mass, and quality of life. They recommended I stop the Megace medication and start an alternative appetite stimulant called Dronibial. This is a medication he will take with Lunch and Dinner. They also recommended trying some oral steroids called Dexamethasone.  Guidelines state that 8 mg of dexamethasone daily significantly improved patients with failure to thrive disorders. I have also ordered labs just to make sure there is not a new issue like anemia, infection, ect     My next options would be to use some androgen steroids such as testosterone injections but they have side effects can be an issue on his heart. I can also order some methylphenidate to try and boost his energy and stamina. I am reserving these for last options. Ladies. I am troubled we are getting close to discussing care options such as hospice. We have no other options for health restoration as he has no nutritional base or reserve. I am open to any referrals you would like but I do believe his health is failing and with that his body is not able to maintain independence and function. Ordering more tests, referrals, and diagnostics I do not believe is the move I would recommend as I believe treatment options if there are any would take him from this world faster and given him less quality of life than he has currently. -     COLLECTION VENOUS BLOOD,VENIPUNCTURE  -     HEMOGLOBIN A1C WITH EAG  -     METABOLIC PANEL, COMPREHENSIVE  -     NT-PRO BNP  -     PREALBUMIN  -     VITAMIN B12 & FOLATE  -     VITAMIN D, 25 HYDROXY  -     ANEMIA PROFILE B  -     CEA  -     IV HYDRATION 1ST HOUR  -     NORMAL SALINE SOLUTION INFUS  -     AMB POC GLUCOSE BLOOD, BY GLUCOSE MONITORING DEVICE  -     CARBOHYDRATE AG 19-9 (SERIAL)  -     cyanocobalamin (VITAMIN B12) 1,000 mcg/mL injection; 1 mL by IntraMUSCular route once for 1 dose. -     INSERT PERIPHERAL IV; Future  -     SED RATE (ESR)  -     C REACTIVE PROTEIN, QT  -     dronabinol (MARINOL) 2.5 mg capsule; Take 1 Cap by mouth two (2) times a day. Max Daily Amount: 5 mg.  -     [place] THER/PROPH/DIAG INJECTION, SUBCUT/IM  -     cyanocobalamin (VITAMIN B-12) 1,000 mcg/mL injection; 1 mL by IntraMUSCular route once for 1 dose.     2. Excessive weight loss  -     COLLECTION VENOUS BLOOD,VENIPUNCTURE  -     HEMOGLOBIN A1C WITH EAG  - METABOLIC PANEL, COMPREHENSIVE  -     NT-PRO BNP  -     PREALBUMIN  -     VITAMIN B12 & FOLATE  -     VITAMIN D, 25 HYDROXY  -     ANEMIA PROFILE B  -     CEA  -     IV HYDRATION 1ST HOUR  -     NORMAL SALINE SOLUTION INFUS  -     AMB POC GLUCOSE BLOOD, BY GLUCOSE MONITORING DEVICE  -     CARBOHYDRATE AG 19-9 (SERIAL)  -     cyanocobalamin (VITAMIN B12) 1,000 mcg/mL injection; 1 mL by IntraMUSCular route once for 1 dose. -     INSERT PERIPHERAL IV; Future  -     SED RATE (ESR)  -     C REACTIVE PROTEIN, QT  -     dronabinol (MARINOL) 2.5 mg capsule; Take 1 Cap by mouth two (2) times a day. Max Daily Amount: 5 mg.  -     MT THER/PROPH/DIAG INJECTION, SUBCUT/IM  -     cyanocobalamin (VITAMIN B-12) 1,000 mcg/mL injection; 1 mL by IntraMUSCular route once for 1 dose. 3. Chronic illness  -     COLLECTION VENOUS BLOOD,VENIPUNCTURE  -     HEMOGLOBIN A1C WITH EAG  -     METABOLIC PANEL, COMPREHENSIVE  -     NT-PRO BNP  -     PREALBUMIN  -     VITAMIN B12 & FOLATE  -     VITAMIN D, 25 HYDROXY  -     ANEMIA PROFILE B  -     CEA  -     IV HYDRATION 1ST HOUR  -     NORMAL SALINE SOLUTION INFUS  -     AMB POC GLUCOSE BLOOD, BY GLUCOSE MONITORING DEVICE  -     CARBOHYDRATE AG 19-9 (SERIAL)  -     cyanocobalamin (VITAMIN B12) 1,000 mcg/mL injection; 1 mL by IntraMUSCular route once for 1 dose. -     INSERT PERIPHERAL IV; Future  -     SED RATE (ESR)  -     C REACTIVE PROTEIN, QT  -     dronabinol (MARINOL) 2.5 mg capsule; Take 1 Cap by mouth two (2) times a day. Max Daily Amount: 5 mg.  -     MT THER/PROPH/DIAG INJECTION, SUBCUT/IM  -     cyanocobalamin (VITAMIN B-12) 1,000 mcg/mL injection; 1 mL by IntraMUSCular route once for 1 dose.     4. Cachexia ([address]  -     COLLECTION VENOUS BLOOD,VENIPUNCTURE  -     HEMOGLOBIN A1C WITH EAG  -     METABOLIC PANEL, COMPREHENSIVE  -     NT-PRO BNP  -     PREALBUMIN  -     VITAMIN B12 & FOLATE  -     VITAMIN D, 25 HYDROXY  -     ANEMIA PROFILE B  -     CEA  -     IV HYDRATION 1ST HOUR  - NORMAL SALINE SOLUTION INFUS  -     AMB POC GLUCOSE BLOOD, BY GLUCOSE MONITORING DEVICE  -     CARBOHYDRATE AG 19-9 (SERIAL)  -     cyanocobalamin (VITAMIN B12) 1,000 mcg/mL injection; 1 mL by IntraMUSCular route once for 1 dose. -     INSERT PERIPHERAL IV; Future  -     SED RATE (ESR)  -     C REACTIVE PROTEIN, QT  -     dronabinol (MARINOL) 2.5 mg capsule; Take 1 Cap by mouth two (2) times a day. Max Daily Amount: 5 mg.  -     [place] THER/PROPH/DIAG INJECTION, SUBCUT/IM  -     cyanocobalamin (VITAMIN B-12) 1,000 mcg/mL injection; 1 mL by IntraMUSCular route once for 1 dose. 5. Weakness generalized  -     COLLECTION VENOUS BLOOD,VENIPUNCTURE  -     HEMOGLOBIN A1C WITH EAG  -     METABOLIC PANEL, COMPREHENSIVE  -     NT-PRO BNP  -     PREALBUMIN  -     VITAMIN B12 & FOLATE  -     VITAMIN D, 25 HYDROXY  -     ANEMIA PROFILE B  -     CEA  -     IV HYDRATION 1ST HOUR  -     NORMAL SALINE SOLUTION INFUS  -     AMB POC GLUCOSE BLOOD, BY GLUCOSE MONITORING DEVICE  -     CARBOHYDRATE AG 19-9 (SERIAL)  -     cyanocobalamin (VITAMIN B12) 1,000 mcg/mL injection; 1 mL by IntraMUSCular route once for 1 dose. -     INSERT PERIPHERAL IV; Future  -     SED RATE (ESR)  -     C REACTIVE PROTEIN, QT  -     dronabinol (MARINOL) 2.5 mg capsule; Take 1 Cap by mouth two (2) times a day. Max Daily Amount: 5 mg.  -     MI THER/PROPH/DIAG INJECTION, SUBCUT/IM  -     cyanocobalamin (VITAMIN B-12) 1,000 mcg/mL injection; 1 mL by IntraMUSCular route once for 1 dose. 6. Vitamin D deficiency   -     VITAMIN D, 25 HYDROXY  -     cyanocobalamin (VITAMIN B12) 1,000 mcg/mL injection; 1 mL by IntraMUSCular route once for 1 dose. -     INSERT PERIPHERAL IV; Future  -     SED RATE (ESR)  -     C REACTIVE PROTEIN, QT  -     dronabinol (MARINOL) 2.5 mg capsule; Take 1 Cap by mouth two (2) times a day.  Max Daily Amount: 5 mg.  -     MI THER/PROPH/DIAG INJECTION, SUBCUT/IM  -     cyanocobalamin (VITAMIN B-12) 1,000 mcg/mL injection; 1 mL by IntraMUSCular route once for 1 dose. 7. Shortness of breath  -     cyanocobalamin (VITAMIN B12) 1,000 mcg/mL injection; 1 mL by IntraMUSCular route once for 1 dose. -     INSERT PERIPHERAL IV; Future  -     SED RATE (ESR)  -     C REACTIVE PROTEIN, QT  -     dronabinol (MARINOL) 2.5 mg capsule; Take 1 Cap by mouth two (2) times a day. Max Daily Amount: 5 mg.  -     [place] THER/PROPH/DIAG INJECTION, SUBCUT/IM  -     cyanocobalamin (VITAMIN B-12) 1,000 mcg/mL injection; 1 mL by IntraMUSCular route once for 1 dose. 8. Excessive postexertional fatigue  -     cyanocobalamin (VITAMIN B12) 1,000 mcg/mL injection; 1 mL by IntraMUSCular route once for 1 dose. -     INSERT PERIPHERAL IV; Future  -     SED RATE (ESR)  -     C REACTIVE PROTEIN, QT  -     dronabinol (MARINOL) 2.5 mg capsule; Take 1 Cap by mouth two (2) times a day. Max Daily Amount: 5 mg.  -     [place] THER/PROPH/DIAG INJECTION, SUBCUT/IM  -     cyanocobalamin (VITAMIN B-12) 1,000 mcg/mL injection; 1 mL by IntraMUSCular route once for 1 dose. 9. Vitamin B 12 deficiency  -     [place] THER/PROPH/DIAG INJECTION, SUBCUT/IM  -     cyanocobalamin (VITAMIN B-12) 1,000 mcg/mL injection; 1 mL by IntraMUSCular route once for 1 dose. Other orders  -     0.9% sodium chloride solution; 150 mL/hr by IntraVENous route once for 1 dose. -     dexamethasone (DECADRON) 4 mg tablet; Take 4 mg by mouth two (2) times daily (with meals). Discontinued Care: The following treatment modalities have been discontinued by the provider today:   Medications Discontinued During This Encounter   Medication Reason    cyanocobalamin 1,000 mcg tablet Alternate Therapy    megestrol (MEGACE) 400 mg/10 mL (40 mg/mL) suspension Alternate Therapy     Subjective: Allergies   Allergen Reactions    Ace Inhibitors Hives    Lisinopril Hives     HIVES, ORAL SWELLING     Prior to Admission medications    Medication Sig Start Date End Date Taking?  Authorizing Provider   0.9% sodium chloride solution 150 mL/hr by IntraVENous route once for 1 dose. 6/5/19 6/5/19 Yes Fidel River NP   cyanocobalamin (VITAMIN B12) 1,000 mcg/mL injection 1 mL by IntraMUSCular route once for 1 dose. 6/5/19 6/5/19 Yes Fidel River NP   dronabinol (MARINOL) 2.5 mg capsule Take 1 Cap by mouth two (2) times a day. Max Daily Amount: 5 mg. 6/5/19  Drew River NP   dexamethasone (DECADRON) 4 mg tablet Take 4 mg by mouth two (2) times daily (with meals). 6/5/19  Drew River NP   cyanocobalamin (VITAMIN B-12) 1,000 mcg/mL injection 1 mL by IntraMUSCular route once for 1 dose. 6/5/19 6/5/19 Yes Fidel River NP   ergocalciferol (ERGOCALCIFEROL) 50,000 unit capsule TAKE ONE CAPSULE BY MOUTH EVERY 7 DAYS 5/31/19  Yes Fidel River NP   mirtazapine (REMERON) 7.5 mg tablet Take 1 Tab by mouth nightly for 7 days, THEN 2 Tabs nightly for 21 days. 5/13/19 6/10/19 Yes Fidel River NP   carvedilol (COREG) 3.125 mg tablet Take 1 Tab by mouth two (2) times daily (with meals). 3/29/19  Drew River NP   dutasteride (AVODART) 0.5 mg capsule Take 1 Cap by mouth daily. 3/29/19  Drew River NP   olmesartan (BENICAR) 40 mg tablet Take 1 Tab by mouth daily. Indications: chronic heart failure, high blood pressure 3/29/19  Yes Fidel River NP   atorvastatin (LIPITOR) 40 mg tablet Take 40 mg by mouth every evening. Yes Provider, Historical   aspirin delayed-release 81 mg tablet Take 81 mg by mouth daily.    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  HX HEENT      TONSILLECTOMY    HX ORTHOPAEDIC  2008    R KNEE REPLACE    HX ORTHOPAEDIC  2004    ARTHROSCOPY R KNEE    HX ORTHOPAEDIC  2002    ARTHROSCOPY L KNEE    HX OTHER SURGICAL      ENDOSCOPY-X2    HX PROSTATECTOMY      HX TURP  11/02/2015    HX UROLOGICAL  11/02/2015    Transurethral resection of bladder tumor    TOTAL KNEE ARTHROPLASTY  2011    LEFT      Social History     Tobacco Use    Smoking status: Never Smoker    Smokeless tobacco: Never Used   Substance Use Topics    Alcohol use:  Yes     Alcohol/week: 2.0 oz     Types: 4 Glasses of wine per week     Comment: 4-5 OZ WINE DAILY    Drug use: No      Family History   Problem Relation Age of Onset    Heart Disease Father     Cancer Mother    Jacqui Lower Problems Neg Hx      Advance Care Planning 8/13/2018   Patient's Healthcare Decision Maker is: Legal Next [address]af [address]   Primary Decision Maker Name Fidel Edward   Primary Decision Maker Phone Number [phone]   Primary Decision Maker Relationship to Patient -   Confirm Advance Directive -   Patient Would Like to Complete Advance Directive -     Test Results:     Office Visit on 05/24/2019   Component Date Value Ref Range Status    Color (UA POC) 05/24/2019 Yellow   Final    Clarity (UA POC) 05/24/2019 Clear   Final    Glucose (UA POC) 05/24/2019 Negative  Negative Final    Bilirubin (UA POC) 05/24/2019 Negative  Negative Final    Ketones (UA POC) 05/24/2019 Negative  Negative Final    Specific gravity (UA POC) 05/24/2019 1.005  1.001 - 1.035 Final    Blood (UA POC) 05/24/2019 3+  Negative Final    pH (UA POC) 05/24/2019 5.0  4.6 - 8.0 Final    Protein (UA POC) 05/24/2019 Positive  Negative Final    Urobilinogen (UA POC) 05/24/2019 normal  0.2 - 1 Final    Nitrites (UA POC) 05/24/2019 Negative  Negative Final    Leukocyte esterase (UA POC) 05/24/2019 3+  Negative Final   Clinical Support on 03/11/2019   Component Date Value Ref Range Status    LD 03/11/2019 173  121 - 224 IU/L Final    (LD) Fraction 1 03/11/2019 25  17 - 32 % Final    (LD) Fraction 2 03/11/2019 32  25 - 40 % Final    (LD) Fraction 3 03/11/2019 22  17 - 27 % Final    (LD) Fraction 4 03/11/2019 9  5 - 13 % Final    (LD) Fraction 5 03/11/2019 12  4 - 20 % Final    WBC 03/11/2019 7.2  3.4 - 10.8 x10E3/uL Final    RBC 03/11/2019 3.92* 4.14 - 5.80 x10E6/uL Final    HGB 03/11/2019 11.9* 13.0 - 17.7 g/dL Final    HCT 03/11/2019 35.4* 37.5 - 51.0 % Final    MCV 03/11/2019 90  79 - 97 fL Final    MCH 03/11/2019 30.4  26.6 - 33.0 pg Final    MCHC 03/11/2019 33.6  31.5 - 35.7 g/dL Final    RDW 03/11/2019 13.6  12.3 - 15.4 % Final    PLATELET 36/32/6609 099  150 - 379 x10E3/uL Final    NEUTROPHILS 03/11/2019 82  Not Estab. % Final    Lymphocytes 03/11/2019 10  Not Estab. % Final    MONOCYTES 03/11/2019 6  Not Estab. % Final    EOSINOPHILS 03/11/2019 2  Not Estab. % Final    BASOPHILS 03/11/2019 0  Not Estab. % Final    ABS. NEUTROPHILS 03/11/2019 5.9  1.4 - 7.0 x10E3/uL Final    Abs Lymphocytes 03/11/2019 0.7  0.7 - 3.1 x10E3/uL Final    ABS. MONOCYTES 03/11/2019 0.4  0.1 - 0.9 x10E3/uL Final    ABS. EOSINOPHILS 03/11/2019 0.2  0.0 - 0.4 x10E3/uL Final    ABS. BASOPHILS 03/11/2019 0.0  0.0 - 0.2 x10E3/uL Final    IMMATURE GRANULOCYTES 03/11/2019 0  Not Estab. % Final    ABS. IMM.  GRANS. 03/11/2019 0.0  0.0 - 0.1 x10E3/uL Final    Hemoglobin A1c 03/11/2019 6.5* 4.8 - 5.6 % Final    Comment:          Prediabetes: 5.7 - 6.4           Diabetes: >6.4           Glycemic control for adults with diabetes: <7.0      Estimated average glucose 03/11/2019 140  mg/dL Final    Cholesterol, total 03/11/2019 113  100 - 199 mg/dL Final    Triglyceride 03/11/2019 60  0 - 149 mg/dL Final    HDL Cholesterol 03/11/2019 43  >39 mg/dL Final    VLDL, calculated 03/11/2019 12  5 - 40 mg/dL Final    LDL, calculated 03/11/2019 58  0 - 99 mg/dL Final    Magnesium 03/11/2019 2.0  1.6 - 2.3 mg/dL Final    Glucose 03/11/2019 132* 65 - 99 mg/dL Final    Comment: Specimen received in contact with cells. No visible hemolysis  present. However GLUC may be decreased and K increased. Clinical  correlation indicated.  BUN 03/11/2019 18  10 - 36 mg/dL Final    Creatinine 03/11/2019 0.78  0.76 - 1.27 mg/dL Final    GFR est non-AA 03/11/2019 79  >59 mL/min/1.73 Final    GFR est AA 03/11/2019 91  >59 mL/min/1.73 Final    BUN/Creatinine ratio 03/11/2019 23  10 - 24 Final    Sodium 03/11/2019 141  134 - 144 mmol/L Final    Potassium 03/11/2019 4.3  3.5 - 5.2 mmol/L Final    Comment: Specimen received in contact with cells. No visible hemolysis  present. However GLUC may be decreased and K increased. Clinical  correlation indicated.  Chloride 03/11/2019 105  96 - 106 mmol/L Final    CO2 03/11/2019 19* 20 - 29 mmol/L Final    Calcium 03/11/2019 9.3  8.6 - 10.2 mg/dL Final    Protein, total 03/11/2019 6.1  6.0 - 8.5 g/dL Final    Albumin 03/11/2019 3.9  3.2 - 4.6 g/dL Final    GLOBULIN, TOTAL 03/11/2019 2.2  1.5 - 4.5 g/dL Final    A-G Ratio 03/11/2019 1.8  1.2 - 2.2 Final    Bilirubin, total 03/11/2019 0.5  0.0 - 1.2 mg/dL Final    Alk.  phosphatase 03/11/2019 101  39 - 117 IU/L Final    AST (SGOT) 03/11/2019 17  0 - 40 IU/L Final    ALT (SGPT) 03/11/2019 11  0 - 44 IU/L Final    PROBNP 03/11/2019 585* 0 - 486 pg/mL Final    Comment: The following cut-points have been suggested for the  use of proBNP for the diagnostic evaluation of heart  failure (HF) in patients with acute dyspnea:  Modality                     Age           Optimal Cut                             (years)            Point  ------------------------------------------------------  Diagnosis (rule in HF)        <50            450 pg/mL                            50 - 75            900 pg/mL                                >75           1800 pg/mL  Exclusion (rule out HF)  Age independent     300 pg/mL      Prostate Specific Ag 03/11/2019 0.7  0.0 - 4.0 ng/mL Final    Comment: Roche ECLIA methodology. According to the American Urological Association, Serum PSA should  decrease and remain at undetectable levels after radical  prostatectomy. The AUA defines biochemical recurrence as an initial  PSA value 0.2 ng/mL or greater followed by a subsequent confirmatory  PSA value 0.2 ng/mL or greater. Values obtained with different assay methods or kits cannot be used  interchangeably. Results cannot be interpreted as absolute evidence  of the presence or absence of malignant disease.  Reflex Criteria 03/11/2019 Comment   Final    Comment: The percent free PSA is performed on a reflex basis only when the  total PSA is between 4.0 and 10.0 ng/mL.  TSH 03/11/2019 2.770  0.450 - 4.500 uIU/mL Final    T4, Total 03/11/2019 7.3  4.5 - 12.0 ug/dL Final    T3 Uptake 03/11/2019 29  24 - 39 % Final    Free Thyroxine Index (FTI) 03/11/2019 2.1  1.2 - 4.9 Final    Vitamin B12 03/11/2019 554  232 - 1,245 pg/mL Final    Folate 03/11/2019 18.8  >3.0 ng/mL Final    Comment: A serum folate concentration of less than 3.1 ng/mL is  considered to represent clinical deficiency.  VITAMIN D, 25-HYDROXY 03/11/2019 25.6* 30.0 - 100.0 ng/mL Final    Comment: Vitamin D deficiency has been defined by the [address] practice guideline as a  level of serum 25-OH vitamin D less than 20 ng/mL (1,2).   The Endocrine Society went on to further define vitamin D  insufficiency as a level between 21 and 29 ng/mL (2). 1. IOM ([place] of [employer]). 2010. Dietary reference     intakes for calcium and D. [facility]: The     [employer]. 2. Jaelyn GUARDADO, Marisol KAPLAN, Vidya LUA, et al.     Evaluation, treatment, and prevention of vitamin D     deficiency: an Endocrine Society clinical practice     guideline. JCEM. 2011 Jul; 96(0):5281-30. Objective:     Vitals:    06/05/19 1143   BP: 134/66   Pulse: 78   Resp: 18   SpO2: 97%   Weight: 122 lb (55.3 kg)   Height: 5' 8\" (1.727 m)     Wt Readings from Last 3 Encounters:   06/05/19 122 lb (55.3 kg)   05/31/19 121 lb 8 oz (55.1 kg)   05/24/19 122 lb (55.3 kg)     BP Readings from Last 3 Encounters:   06/05/19 134/66   05/31/19 132/68   05/13/19 118/64     Review of Systems   Constitutional: Positive for activity change, appetite change, fatigue and unexpected weight change. Negative for chills and fever. HENT: Negative for congestion, dental problem, hearing loss, postnasal drip, sinus pressure, sneezing, sore throat and trouble swallowing. Eyes: Negative for discharge, redness and visual disturbance. Respiratory: Positive for shortness of breath. Negative for apnea, cough, chest tightness and wheezing. Cardiovascular: Negative for chest pain, palpitations and leg swelling. Gastrointestinal: Negative for abdominal distention, abdominal pain, blood in stool, constipation, diarrhea, nausea and vomiting. Endocrine: Negative for polydipsia, polyphagia and polyuria. Genitourinary: Negative for flank pain, frequency and urgency. Musculoskeletal: Positive for arthralgias, back pain, gait problem, joint swelling, myalgias, neck pain and neck stiffness. Skin: Negative for color change, pallor, rash and wound. Allergic/Immunologic: Positive for environmental allergies. Negative for food allergies. Neurological: Positive for weakness. Negative for dizziness, tremors, light-headedness, numbness and headaches.    Hematological: Negative for adenopathy. Psychiatric/Behavioral: Positive for confusion. Negative for agitation and sleep disturbance. The patient is not nervous/anxious. Physical Exam   Constitutional: Vital signs are normal. He appears malnourished and dehydrated. He appears to not be writhing in pain. He appears unhealthy. He appears cachectic. He appears toxic. He has a sickly appearance. No distress. Thin, frail, fragile in appearing, elderly,  male. He is very frail. Sunken appearance to face. Loss of muscle tone. Emaciated. He also has a gray, pale appearance to his skin. HENT:   Head: Normocephalic and atraumatic. Right Ear: External ear and ear canal normal. A middle ear effusion is present. Decreased hearing is noted. Left Ear: External ear and ear canal normal. A middle ear effusion is present. Decreased hearing is noted. Nose: Mucosal edema and rhinorrhea present. Mouth/Throat: Uvula is midline. Mucous membranes are not pale, dry and not cyanotic. No oral lesions. No uvula swelling. Posterior oropharyngeal erythema present. No posterior oropharyngeal edema. Tenting of skin with moderate tongue furrowing present    Eyes: Pupils are equal, round, and reactive to light. Conjunctivae, EOM and lids are normal. Lids are everted and swept, no foreign bodies found. Neck: Trachea normal. No JVD present. Spinous process tenderness and muscular tenderness present. Neck rigidity present. Decreased range of motion present. No thyromegaly present. Cardiovascular: S1 normal, S2 normal, intact distal pulses and normal pulses. An irregularly irregular rhythm present. Bradycardia present. Exam reveals distant heart sounds. Exam reveals no gallop and no friction rub. No murmur heard. No extremity edema is present during today's exam.    Pulmonary/Chest: Effort normal and breath sounds normal.   Upper airway congestion.  Seems to be seasonal allergies as his eyes are watering as well and throat has drainage from his nose. Abdominal: Soft. Normal appearance and normal aorta. Bowel sounds are tinkling. There is no hepatosplenomegaly. There is no tenderness. There is no rigidity, no guarding and no CVA tenderness. He reports no appetitive as well as increased weight loss. Genitourinary: Rectum normal, testes/scrotum normal and penis normal.   Genitourinary Comments: Frequency, urgency, constant urge to urinate. Pain in lower abdomen that radiates across from the right flank to the left inguinal area when lying down. Musculoskeletal:        Right shoulder: He exhibits decreased range of motion, swelling, effusion, crepitus, deformity, pain, abnormal pulse and decreased strength. Left shoulder: He exhibits decreased range of motion, tenderness, bony tenderness, swelling, crepitus, deformity, pain, spasm and decreased strength. Generalized chronic extremity weakness is present, he is using a cane to ambulate. He is weak in all extremities and has no range of motion in either right or left shoulder. He states this is chronic. Lymphadenopathy:        Head (right side): No submandibular, no tonsillar and no posterior auricular adenopathy present. Head (left side): No submandibular, no tonsillar and no posterior auricular adenopathy present. He has no cervical adenopathy. Right: Inguinal adenopathy present. Palpable right inguinal swelling mid inguinal region. Round, hard, attached. No pain with palpation. CT results noted the lymph node to be 4 cm x 2 cm. Neurological: He is alert. He has normal reflexes and intact cranial nerves. He is disoriented. He displays weakness, atrophy, abnormal stance and abnormal speech. A sensory deficit is present. He exhibits abnormal muscle tone. Coordination and gait abnormal. GCS score is 15. Skin: Skin is warm, dry and intact. Bruising noted. No rash noted. He is not diaphoretic. No cyanosis. There is pallor. Nails show clubbing.    His skin is gray in color. He is malnourished in appearance. His hydration has improved slightly over the weekend and currently he is more up beat then Friday. His daughter and wife are here today and state he is having a harder time with medication administration problem solving. Psychiatric: Mood and memory normal. His affect is blunt and inappropriate. He expresses impulsivity. He exhibits disordered thought content. Baseline mood and affect unchanged from normal.         Disclaimer:   Mr. Daron Redmond has been advised to call or return to our office if symptoms worsen/change/persist. We as a care team including the patient; discussed expected course/resolution/complications of diagnosis in detail today. Medication risks/benefits/costs/interactions/alternatives discussed Daniel Craft was given an after visit summary which includes diagnoses, current medications, & vitals. Daniel Craft expressed understanding with the diagnosis and plan.

## 2019-06-06 LAB
25(OH)D3+25(OH)D2 SERPL-MCNC: 43.7 NG/ML (ref 30–100)
ALBUMIN SERPL-MCNC: 3.3 G/DL (ref 3.2–4.6)
ALBUMIN/GLOB SERPL: 1.3 {RATIO} (ref 1.2–2.2)
ALP SERPL-CCNC: 63 IU/L (ref 39–117)
ALT SERPL-CCNC: 35 IU/L (ref 0–44)
AST SERPL-CCNC: 24 IU/L (ref 0–40)
BASOPHILS # BLD AUTO: 0 X10E3/UL (ref 0–0.2)
BASOPHILS NFR BLD AUTO: 0 %
BILIRUB SERPL-MCNC: 0.4 MG/DL (ref 0–1.2)
BUN SERPL-MCNC: 40 MG/DL (ref 10–36)
BUN/CREAT SERPL: 40 (ref 10–24)
CALCIUM SERPL-MCNC: 8.6 MG/DL (ref 8.6–10.2)
CANCER AG19-9 SERPL-ACNC: 22 U/ML (ref 0–35)
CEA SERPL-MCNC: 3 NG/ML (ref 0–4.7)
CHLORIDE SERPL-SCNC: 109 MMOL/L (ref 96–106)
CO2 SERPL-SCNC: 18 MMOL/L (ref 20–29)
CREAT SERPL-MCNC: 1.01 MG/DL (ref 0.76–1.27)
CRP SERPL-MCNC: 32.9 MG/L (ref 0–4.9)
EOSINOPHIL # BLD AUTO: 0.1 X10E3/UL (ref 0–0.4)
EOSINOPHIL NFR BLD AUTO: 1 %
ERYTHROCYTE [DISTWIDTH] IN BLOOD BY AUTOMATED COUNT: 15.4 % (ref 12.3–15.4)
ERYTHROCYTE [SEDIMENTATION RATE] IN BLOOD BY WESTERGREN METHOD: 60 MM/HR (ref 0–30)
EST. AVERAGE GLUCOSE BLD GHB EST-MCNC: 157 MG/DL
FERRITIN SERPL-MCNC: 556 NG/ML (ref 30–400)
FOLATE SERPL-MCNC: 17.2 NG/ML
GLOBULIN SER CALC-MCNC: 2.5 G/DL (ref 1.5–4.5)
GLUCOSE SERPL-MCNC: 140 MG/DL (ref 65–99)
HBA1C MFR BLD: 7.1 % (ref 4.8–5.6)
HCT VFR BLD AUTO: 31.4 % (ref 37.5–51)
HGB BLD-MCNC: 10.5 G/DL (ref 13–17.7)
IMM GRANULOCYTES # BLD AUTO: 0.3 X10E3/UL (ref 0–0.1)
IMM GRANULOCYTES NFR BLD AUTO: 4 %
IRON SATN MFR SERPL: 34 % (ref 15–55)
IRON SERPL-MCNC: 72 UG/DL (ref 38–169)
LYMPHOCYTES # BLD AUTO: 1 X10E3/UL (ref 0.7–3.1)
LYMPHOCYTES NFR BLD AUTO: 13 %
MCH RBC QN AUTO: 30.9 PG (ref 26.6–33)
MCHC RBC AUTO-ENTMCNC: 33.4 G/DL (ref 31.5–35.7)
MCV RBC AUTO: 92 FL (ref 79–97)
MONOCYTES # BLD AUTO: 0.6 X10E3/UL (ref 0.1–0.9)
MONOCYTES NFR BLD AUTO: 8 %
NEUTROPHILS # BLD AUTO: 5.7 X10E3/UL (ref 1.4–7)
NEUTROPHILS NFR BLD AUTO: 74 %
NT-PROBNP SERPL-MCNC: 575 PG/ML (ref 0–486)
PLATELET # BLD AUTO: 213 X10E3/UL (ref 150–450)
POTASSIUM SERPL-SCNC: 4.3 MMOL/L (ref 3.5–5.2)
PREALB SERPL-MCNC: 24 MG/DL (ref 9–32)
PROT SERPL-MCNC: 5.8 G/DL (ref 6–8.5)
RBC # BLD AUTO: 3.4 X10E6/UL (ref 4.14–5.8)
RETICS/RBC NFR AUTO: 1.4 % (ref 0.6–2.6)
SODIUM SERPL-SCNC: 141 MMOL/L (ref 134–144)
TIBC SERPL-MCNC: 209 UG/DL (ref 250–450)
UIBC SERPL-MCNC: 137 UG/DL (ref 111–343)
VIT B12 SERPL-MCNC: >2000 PG/ML (ref 232–1245)
WBC # BLD AUTO: 7.7 X10E3/UL (ref 3.4–10.8)

## 2019-06-07 ENCOUNTER — OFFICE VISIT (OUTPATIENT)
Dept: GERIATRIC MEDICINE | Age: 84
End: 2019-06-07

## 2019-06-07 VITALS
HEIGHT: 68 IN | RESPIRATION RATE: 18 BRPM | HEART RATE: 80 BPM | SYSTOLIC BLOOD PRESSURE: 118 MMHG | DIASTOLIC BLOOD PRESSURE: 62 MMHG | OXYGEN SATURATION: 97 % | WEIGHT: 121.6 LBS | BODY MASS INDEX: 18.43 KG/M2

## 2019-06-07 DIAGNOSIS — R62.7 ADULT FAILURE TO THRIVE: Primary | ICD-10-CM

## 2019-06-07 DIAGNOSIS — R64 CACHEXIA (HCC): ICD-10-CM

## 2019-06-07 DIAGNOSIS — R53.1 WEAKNESS GENERALIZED: ICD-10-CM

## 2019-06-07 DIAGNOSIS — R63.4 EXCESSIVE WEIGHT LOSS: ICD-10-CM

## 2019-06-07 NOTE — PROGRESS NOTES
Reason for Visit   Stan Moraes is a 80 y.o. male patient who presents today for :  Chief Complaint   Patient presents with    Medication Evaluation     Patient here for follow up with medications and hydration. Follow Up: Follow-up and Dispositions    · Return in about 4 days (around 6/11/2019) for with the NP for follow up to your treatment plan. Diagnosis/Treatment Plan:    Mr. Ezio Heredia presents today following 36 hours post IV Fluids. He states yesterday he felt really good but today he is back to being tired and weak. He does report that he is feeling a bit less discomfort with his joints and believes he is moving and standing up easier. This is good to hear. I have asked him to give this plan a few more days of a chance to see if it helps with his drowsiness. He is going to not take the appetite stimulant if he finds he is more fatigued tomorrow and we will hold it until Tuesday. He is hopeful and that is great. Diagnoses and all orders for this visit:    1. Adult failure to thrive    2. Excessive weight loss    3. Cachexia ([address]    4. Weakness generalized      Discontinued Care: The following treatment modalities have been discontinued by the provider today:   There are no discontinued medications. Subjective: Allergies   Allergen Reactions    Ace Inhibitors Hives    Lisinopril Hives     HIVES, ORAL SWELLING     Prior to Admission medications    Medication Sig Start Date End Date Taking? Authorizing Provider   dronabinol (MARINOL) 2.5 mg capsule Take 1 Cap by mouth two (2) times a day. Max Daily Amount: 5 mg. 6/5/19  Yes Casey Maravilla NP   dexamethasone (DECADRON) 4 mg tablet Take 4 mg by mouth two (2) times daily (with meals).  6/5/19  Yes Casey Maravilla NP   ergocalciferol (ERGOCALCIFEROL) 50,000 unit capsule TAKE ONE CAPSULE BY MOUTH EVERY 7 DAYS 5/31/19  Yes Casey Maravilla NP   mirtazapine (REMERON) 7.5 mg tablet Take 1 Tab by mouth nightly for 7 days, THEN 2 Tabs nightly for 21 days. 5/13/19 6/10/19 Yes Mabel Colbert NP   carvedilol (COREG) 3.125 mg tablet Take 1 Tab by mouth two (2) times daily (with meals). 3/29/19  Yes Mabel Colbert NP   dutasteride (AVODART) 0.5 mg capsule Take 1 Cap by mouth daily. 3/29/19  Yes Mabel Colbert NP   olmesartan (BENICAR) 40 mg tablet Take 1 Tab by mouth daily. Indications: chronic heart failure, high blood pressure 3/29/19  Yes Mabel Colbert NP   atorvastatin (LIPITOR) 40 mg tablet Take 40 mg by mouth every evening. Yes Provider, Historical   aspirin delayed-release 81 mg tablet Take 81 mg by mouth daily. 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 ([facility] [address]     Chronic pain     Essential (primary) hypertension 11/10/2017    Essential hypertension 6/8/2018    GERD (gastroesophageal reflux disease)     History of colonoscopy 05/22/2014    Hypercholesterolemia     Hyperlipemia 11/10/2017    Microscopic hematuria     Osteoarthritis of left knee 2/1/2011    Other ill-defined conditions(799.89)     BPH     Past Surgical History:   Procedure Laterality Date    CARDIAC SURG PROCEDURE UNLIST  1999    CABG    HX CATARACT REMOVAL Bilateral 07/19/2016    HX COLONOSCOPY      HX GI      ESOPH DIL.     HX HEENT      TONSILLECTOMY    HX ORTHOPAEDIC  2008    R KNEE REPLACE    HX ORTHOPAEDIC  2004    ARTHROSCOPY R KNEE    HX ORTHOPAEDIC  2002    ARTHROSCOPY L KNEE    HX OTHER SURGICAL      ENDOSCOPY-X2    HX PROSTATECTOMY      HX TURP  11/02/2015    HX UROLOGICAL  11/02/2015    Transurethral resection of bladder tumor    TOTAL KNEE ARTHROPLASTY  2011    LEFT      Social History     Tobacco Use    Smoking status: Never Smoker    Smokeless tobacco: Never Used   Substance Use Topics    Alcohol use: Yes     Alcohol/week: 2.0 oz     Types: 4 Glasses of wine per week     Comment: 4-5 OZ WINE DAILY    Drug use: No      Family History   Problem Relation Age of Onset    Heart Disease Father     Cancer Mother    Antonietta August Problems Neg Hx      Advance Care Planning 8/13/2018   Patient's Healthcare Decision Maker is: Legal Next of Kin   Primary Decision Maker Name Peter More   Primary Decision Maker Phone Number [phone]   Primary Decision Maker Relationship to Patient -   Confirm Advance Directive -   Patient Would Like to Complete Advance Directive -     Test Results:     Office Visit on 06/05/2019   Component Date Value Ref Range Status    Hemoglobin A1c 06/05/2019 7.1* 4.8 - 5.6 % Final    Comment:          Prediabetes: 5.7 - 6.4           Diabetes: >6.4           Glycemic control for adults with diabetes: <7.0      Estimated average glucose 06/05/2019 157  mg/dL Final    Glucose 06/05/2019 140* 65 - 99 mg/dL Final    BUN 06/05/2019 40* 10 - 36 mg/dL Final    Creatinine 06/05/2019 1.01  0.76 - 1.27 mg/dL Final    GFR est non-AA 06/05/2019 65  >59 mL/min/1.73 Final    GFR est AA 06/05/2019 75  >59 mL/min/1.73 Final    BUN/Creatinine ratio 06/05/2019 40* 10 - 24 Final    Sodium 06/05/2019 141  134 - 144 mmol/L Final    Potassium 06/05/2019 4.3  3.5 - 5.2 mmol/L Final    Chloride 06/05/2019 109* 96 - 106 mmol/L Final    CO2 06/05/2019 18* 20 - 29 mmol/L Final    Calcium 06/05/2019 8.6  8.6 - 10.2 mg/dL Final    Protein, total 06/05/2019 5.8* 6.0 - 8.5 g/dL Final    Albumin 06/05/2019 3.3  3.2 - 4.6 g/dL Final    GLOBULIN, TOTAL 06/05/2019 2.5  1.5 - 4.5 g/dL Final    A-G Ratio 06/05/2019 1.3  1.2 - 2.2 Final    Bilirubin, total 06/05/2019 0.4  0.0 - 1.2 mg/dL Final    Alk.  phosphatase 06/05/2019 63  39 - 117 IU/L Final    AST (SGOT) 06/05/2019 24  0 - 40 IU/L Final    ALT (SGPT) 06/05/2019 35  0 - 44 IU/L Final    PROBNP 06/05/2019 575* 0 - 486 pg/mL Final    Comment: The following cut-points have been suggested for the  use of proBNP for the diagnostic evaluation of heart  failure (HF) in patients with acute dyspnea:  Modality                     Age           Optimal Cut                             (years)            Point  ------------------------------------------------------  Diagnosis (rule in HF)        <50            450 pg/mL                            50 - 75            900 pg/mL                                >75           1800 pg/mL  Exclusion (rule out HF)  Age independent     300 pg/mL      Prealbumin 06/05/2019 24  9 - 32 mg/dL Final    VITAMIN D, 25-HYDROXY 06/05/2019 43.7  30.0 - 100.0 ng/mL Final    Comment: Vitamin D deficiency has been defined by the [place] of  [employer]icine and an Endocrine Society practice guideline as a  level of serum 25-OH vitamin D less than 20 ng/mL (1,2). The Endocrine Society went on to further define vitamin D  insufficiency as a level between 21 and 29 ng/mL (2). 1. IOM ([place] of [employer]). 2010. Dietary reference     intakes for calcium and D. [facility]: The     [employer]. 2. Jaelyn MF, Marisol KAPLAN, Vidya LUA, et al.     Evaluation, treatment, and prevention of vitamin D     deficiency: an Endocrine Society clinical practice     guideline. JCEM. 2011 Jul; 96(7):1911-30.  TIBC 06/05/2019 209* 250 - 450 ug/dL Final    UIBC 06/05/2019 137  111 - 343 ug/dL Final    Iron 06/05/2019 72  38 - 169 ug/dL Final    Iron % saturation 06/05/2019 34  15 - 55 % Final    Ferritin 06/05/2019 556* 30 - 400 ng/mL Final    Vitamin B12 06/05/2019 >2000* 232 - 1245 pg/mL Final    Folate 06/05/2019 17.2  >3.0 ng/mL Final    Comment: A serum folate concentration of less than 3.1 ng/mL is  considered to represent clinical deficiency.       WBC 06/05/2019 7.7  3.4 - 10.8 x10E3/uL Final    RBC 06/05/2019 3.40* 4.14 - 5.80 x10E6/uL Final    HGB 06/05/2019 10.5* 13.0 - 17.7 g/dL Final    HCT 06/05/2019 31.4* 37.5 - 51.0 % Final    MCV 06/05/2019 92  79 - 97 fL Final    MCH 06/05/2019 30.9  26.6 - 33.0 pg Final    MCHC 06/05/2019 33.4  31.5 - 35.7 g/dL Final    RDW 06/05/2019 15.4  12.3 - 15.4 % Final    PLATELET 47/84/0736 203  150 - 450 x10E3/uL Final    NEUTROPHILS 06/05/2019 74  Not Estab. % Final    Lymphocytes 06/05/2019 13  Not Estab. % Final    MONOCYTES 06/05/2019 8  Not Estab. % Final    EOSINOPHILS 06/05/2019 1  Not Estab. % Final    BASOPHILS 06/05/2019 0  Not Estab. % Final    ABS. NEUTROPHILS 06/05/2019 5.7  1.4 - 7.0 x10E3/uL Final    Abs Lymphocytes 06/05/2019 1.0  0.7 - 3.1 x10E3/uL Final    ABS. MONOCYTES 06/05/2019 0.6  0.1 - 0.9 x10E3/uL Final    ABS. EOSINOPHILS 06/05/2019 0.1  0.0 - 0.4 x10E3/uL Final    ABS. BASOPHILS 06/05/2019 0.0  0.0 - 0.2 x10E3/uL Final    IMMATURE GRANULOCYTES 06/05/2019 4  Not Estab. % Final    ABS. IMM. GRANS. 06/05/2019 0.3* 0.0 - 0.1 x10E3/uL Final    Comment: (An elevated percentage of Immature Granulocytes has not been found  to be clinically significant as a sole clinical predictor of disease. Does NOT include bands or blast cells. Pregnancy associated  physiological leukocytosis may also show increased immature  granulocytes without clinical significance.)      Reticulocyte count 06/05/2019 1.4  0.6 - 2.6 % Final    CEA 06/05/2019 3.0  0.0 - 4.7 ng/mL Final    Comment:                              Nonsmokers          <3.9                               Smokers             <5.6  Roche [employer] Electrochemiluminescence Immunoassay  (ECLIA)  Values obtained with different assay methods or kits  cannot be used interchangeably. Results cannot be  interpreted as absolute evidence of the presence or  absence of malignant disease.       Glucose POC 06/05/2019 126  mg/dL Final    Carbohydrate Antigen 19-9, (CA 19-* 06/05/2019 22  0 - 35 U/mL Final    Comment: Roche [employer]ostics Electrochemiluminescence Immunoassay (ECLIA)  Values obtained with different assay methods or kits cannot be  used interchangeably. Results cannot be interpreted as absolute  evidence of the presence or absence of malignant disease.  Sed rate (ESR) 06/05/2019 60* 0 - 30 mm/hr Final    C-Reactive Protein, Qt 06/05/2019 32.9* 0.0 - 4.9 mg/L Final    Comment:                **Effective June 17, 2019 the reference interval for**                   C-Reactive Protein, Quant, will be changing to: Age             Male         Female                                0  - 30 days    Not Estab.    Not Estab.                           1 month - 17 years     0 -  7       0 -  9                                    >17 years     0 - 10       0 - 10     Office Visit on 05/24/2019   Component Date Value Ref Range Status    Color (UA POC) 05/24/2019 Yellow   Final    Clarity (UA POC) 05/24/2019 Clear   Final    Glucose (UA POC) 05/24/2019 Negative  Negative Final    Bilirubin (UA POC) 05/24/2019 Negative  Negative Final    Ketones (UA POC) 05/24/2019 Negative  Negative Final    Specific gravity (UA POC) 05/24/2019 1.005  1.001 - 1.035 Final    Blood (UA POC) 05/24/2019 3+  Negative Final    pH (UA POC) 05/24/2019 5.0  4.6 - 8.0 Final    Protein (UA POC) 05/24/2019 Positive  Negative Final    Urobilinogen (UA POC) 05/24/2019 normal  0.2 - 1 Final    Nitrites (UA POC) 05/24/2019 Negative  Negative Final    Leukocyte esterase (UA POC) 05/24/2019 3+  Negative Final   Clinical Support on 03/11/2019   Component Date Value Ref Range Status    LD 03/11/2019 173  121 - 224 IU/L Final    (LD) Fraction 1 03/11/2019 25  17 - 32 % Final    (LD) Fraction 2 03/11/2019 32  25 - 40 % Final    (LD) Fraction 3 03/11/2019 22  17 - 27 % Final    (LD) Fraction 4 03/11/2019 9  5 - 13 % Final    (LD) Fraction 5 03/11/2019 12  4 - 20 % Final    WBC 03/11/2019 7.2 3.4 - 10.8 x10E3/uL Final    RBC 03/11/2019 3.92* 4.14 - 5.80 x10E6/uL Final    HGB 03/11/2019 11.9* 13.0 - 17.7 g/dL Final    HCT 03/11/2019 35.4* 37.5 - 51.0 % Final    MCV 03/11/2019 90  79 - 97 fL Final    MCH 03/11/2019 30.4  26.6 - 33.0 pg Final    MCHC 03/11/2019 33.6  31.5 - 35.7 g/dL Final    RDW 03/11/2019 13.6  12.3 - 15.4 % Final    PLATELET 56/11/6436 901  150 - 379 x10E3/uL Final    NEUTROPHILS 03/11/2019 82  Not Estab. % Final    Lymphocytes 03/11/2019 10  Not Estab. % Final    MONOCYTES 03/11/2019 6  Not Estab. % Final    EOSINOPHILS 03/11/2019 2  Not Estab. % Final    BASOPHILS 03/11/2019 0  Not Estab. % Final    ABS. NEUTROPHILS 03/11/2019 5.9  1.4 - 7.0 x10E3/uL Final    Abs Lymphocytes 03/11/2019 0.7  0.7 - 3.1 x10E3/uL Final    ABS. MONOCYTES 03/11/2019 0.4  0.1 - 0.9 x10E3/uL Final    ABS. EOSINOPHILS 03/11/2019 0.2  0.0 - 0.4 x10E3/uL Final    ABS. BASOPHILS 03/11/2019 0.0  0.0 - 0.2 x10E3/uL Final    IMMATURE GRANULOCYTES 03/11/2019 0  Not Estab. % Final    ABS. IMM. GRANS. 03/11/2019 0.0  0.0 - 0.1 x10E3/uL Final    Hemoglobin A1c 03/11/2019 6.5* 4.8 - 5.6 % Final    Comment:          Prediabetes: 5.7 - 6.4           Diabetes: >6.4           Glycemic control for adults with diabetes: <7.0      Estimated average glucose 03/11/2019 140  mg/dL Final    Cholesterol, total 03/11/2019 113  100 - 199 mg/dL Final    Triglyceride 03/11/2019 60  0 - 149 mg/dL Final    HDL Cholesterol 03/11/2019 43  >39 mg/dL Final    VLDL, calculated 03/11/2019 12  5 - 40 mg/dL Final    LDL, calculated 03/11/2019 58  0 - 99 mg/dL Final    Magnesium 03/11/2019 2.0  1.6 - 2.3 mg/dL Final    Glucose 03/11/2019 132* 65 - 99 mg/dL Final    Comment: Specimen received in contact with cells. No visible hemolysis  present. However GLUC may be decreased and K increased. Clinical  correlation indicated.       BUN 03/11/2019 18  10 - 36 mg/dL Final    Creatinine 03/11/2019 0.78  0.76 - 1.27 mg/dL Final    GFR est non-AA 03/11/2019 79  >59 mL/min/1.73 Final    GFR est AA 03/11/2019 91  >59 mL/min/1.73 Final    BUN/Creatinine ratio 03/11/2019 23  10 - 24 Final    Sodium 03/11/2019 141  134 - 144 mmol/L Final    Potassium 03/11/2019 4.3  3.5 - 5.2 mmol/L Final    Comment: Specimen received in contact with cells. No visible hemolysis  present. However GLUC may be decreased and K increased. Clinical  correlation indicated.  Chloride 03/11/2019 105  96 - 106 mmol/L Final    CO2 03/11/2019 19* 20 - 29 mmol/L Final    Calcium 03/11/2019 9.3  8.6 - 10.2 mg/dL Final    Protein, total 03/11/2019 6.1  6.0 - 8.5 g/dL Final    Albumin 03/11/2019 3.9  3.2 - 4.6 g/dL Final    GLOBULIN, TOTAL 03/11/2019 2.2  1.5 - 4.5 g/dL Final    A-G Ratio 03/11/2019 1.8  1.2 - 2.2 Final    Bilirubin, total 03/11/2019 0.5  0.0 - 1.2 mg/dL Final    Alk. phosphatase 03/11/2019 101  39 - 117 IU/L Final    AST (SGOT) 03/11/2019 17  0 - 40 IU/L Final    ALT (SGPT) 03/11/2019 11  0 - 44 IU/L Final    PROBNP 03/11/2019 585* 0 - 486 pg/mL Final    Comment: The following cut-points have been suggested for the  use of proBNP for the diagnostic evaluation of heart  failure (HF) in patients with acute dyspnea:  Modality                     Age           Optimal Cut                             (years)            Point  ------------------------------------------------------  Diagnosis (rule in HF)        <50            450 pg/mL                            50 - 75            900 pg/mL                                >75           1800 pg/mL  Exclusion (rule out HF)  Age independent     300 pg/mL      Prostate Specific Ag 03/11/2019 0.7  0.0 - 4.0 ng/mL Final    Comment: Roche ECLIA methodology. According to the American Urological Association, Serum PSA should  decrease and remain at undetectable levels after radical  prostatectomy.  The AUA defines biochemical recurrence as an initial  PSA value 0.2 ng/mL or greater followed by a subsequent confirmatory  PSA value 0.2 ng/mL or greater. Values obtained with different assay methods or kits cannot be used  interchangeably. Results cannot be interpreted as absolute evidence  of the presence or absence of malignant disease.  Reflex Criteria 03/11/2019 Comment   Final    Comment: The percent free PSA is performed on a reflex basis only when the  total PSA is between 4.0 and 10.0 ng/mL.  TSH 03/11/2019 2.770  0.450 - 4.500 uIU/mL Final    T4, Total 03/11/2019 7.3  4.5 - 12.0 ug/dL Final    T3 Uptake 03/11/2019 29  24 - 39 % Final    Free Thyroxine Index (FTI) 03/11/2019 2.1  1.2 - 4.9 Final    Vitamin B12 03/11/2019 554  232 - 1,245 pg/mL Final    Folate 03/11/2019 18.8  >3.0 ng/mL Final    Comment: A serum folate concentration of less than 3.1 ng/mL is  considered to represent clinical deficiency.  VITAMIN D, 25-HYDROXY 03/11/2019 25.6* 30.0 - 100.0 ng/mL Final    Comment: Vitamin D deficiency has been defined by the [address] [place] [address] practice guideline as a  level of serum 25-OH vitamin D less than 20 ng/mL (1,2). The Endocrine Society went on to further define vitamin D  insufficiency as a level between 21 and 29 ng/mL (2). 1. IOM ([place] of [employer]). 2010. Dietary reference     intakes for calcium and D. [address] [facility]: The     [place]. 2. Jaelyn MF, Marisol NC, Vidya LUA, et al.     Evaluation, treatment, and prevention of vitamin D     deficiency: an Endocrine Society clinical practice     guideline. JCEM. 2011 Jul; 96(7):1911-30.        Objective:     Vitals:    06/07/19 1446   BP: 118/62   Pulse: 80   Resp: 18   SpO2: 97%   Weight: 121 lb 9.6 oz (55.2 kg)   Height: 5' 8\" (1.727 m)     Wt Readings from Last 3 Encounters:   06/07/19 121 lb 9.6 oz (55.2 kg)   06/05/19 122 lb (55.3 kg)   05/31/19 121 lb 8 oz (55.1 kg)     BP Readings from Last 3 Encounters:   06/07/19 118/62   06/05/19 134/66   05/31/19 132/68 Review of Systems   Constitutional: Positive for activity change, appetite change, fatigue and unexpected weight change. Negative for chills and fever. HENT: Negative for congestion, dental problem, hearing loss, postnasal drip, sinus pressure, sneezing, sore throat and trouble swallowing. Eyes: Negative for discharge, redness and visual disturbance. Respiratory: Positive for shortness of breath. Negative for apnea, cough, chest tightness and wheezing. Cardiovascular: Negative for chest pain, palpitations and leg swelling. Gastrointestinal: Negative for abdominal distention, abdominal pain, blood in stool, constipation, diarrhea, nausea and vomiting. Endocrine: Negative for polydipsia, polyphagia and polyuria. Genitourinary: Negative for flank pain, frequency and urgency. Musculoskeletal: Positive for arthralgias, back pain, gait problem, joint swelling, myalgias, neck pain and neck stiffness. Skin: Negative for color change, pallor, rash and wound. Allergic/Immunologic: Positive for environmental allergies. Negative for food allergies. Neurological: Positive for weakness. Negative for dizziness, tremors, light-headedness, numbness and headaches. Hematological: Negative for adenopathy. Psychiatric/Behavioral: Positive for confusion. Negative for agitation and sleep disturbance. The patient is not nervous/anxious. Physical Exam   Constitutional: Vital signs are normal. He appears malnourished and dehydrated. He appears to not be writhing in pain. He appears unhealthy. He appears cachectic. He appears toxic. He has a sickly appearance. No distress. Thin, frail, fragile in appearing, elderly,  male. He is very frail. Sunken appearance to face. Loss of muscle tone. Emaciated. He also has a gray, pale appearance to his skin. HENT:   Head: Normocephalic and atraumatic. Right Ear: External ear and ear canal normal. A middle ear effusion is present.  Decreased hearing is noted. Left Ear: External ear and ear canal normal. A middle ear effusion is present. Decreased hearing is noted. Nose: Mucosal edema and rhinorrhea present. Mouth/Throat: Uvula is midline. Mucous membranes are not pale, dry and not cyanotic. No oral lesions. No uvula swelling. Posterior oropharyngeal erythema present. No posterior oropharyngeal edema. Tenting of skin with moderate tongue furrowing present    Eyes: Pupils are equal, round, and reactive to light. Conjunctivae, EOM and lids are normal. Lids are everted and swept, no foreign bodies found. Neck: Trachea normal. No JVD present. Spinous process tenderness and muscular tenderness present. Neck rigidity present. Decreased range of motion present. No thyromegaly present. Cardiovascular: S1 normal, S2 normal, intact distal pulses and normal pulses. An irregularly irregular rhythm present. Bradycardia present. Exam reveals distant heart sounds. Exam reveals no gallop and no friction rub. No murmur heard. No extremity edema is present during today's exam.    Pulmonary/Chest: Effort normal and breath sounds normal.   Upper airway congestion. Seems to be seasonal allergies as his eyes are watering as well and throat has drainage from his nose. Abdominal: Soft. Normal appearance and normal aorta. Bowel sounds are tinkling. There is no hepatosplenomegaly. There is no tenderness. There is no rigidity, no guarding and no CVA tenderness. He reports no appetitive as well as increased weight loss. Genitourinary: Rectum normal, testes/scrotum normal and penis normal.   Genitourinary Comments: Frequency, urgency, constant urge to urinate. Pain in lower abdomen that radiates across from the right flank to the left inguinal area when lying down. Musculoskeletal:        Right shoulder: He exhibits decreased range of motion, swelling, effusion, crepitus, deformity, pain, abnormal pulse and decreased strength.         Left shoulder: He exhibits decreased range of motion, tenderness, bony tenderness, swelling, crepitus, deformity, pain, spasm and decreased strength. Generalized chronic extremity weakness is present, he is using a cane to ambulate. He is weak in all extremities and has no range of motion in either right or left shoulder. He states this is chronic. Lymphadenopathy:        Head (right side): No submandibular, no tonsillar and no posterior auricular adenopathy present. Head (left side): No submandibular, no tonsillar and no posterior auricular adenopathy present. He has no cervical adenopathy. Right: Inguinal adenopathy present. Palpable right inguinal swelling mid inguinal region. Round, hard, attached. No pain with palpation. CT results noted the lymph node to be 4 cm x 2 cm. Neurological: He is alert. He has normal reflexes and intact cranial nerves. He is disoriented. He displays weakness, atrophy, abnormal stance and abnormal speech. A sensory deficit is present. He exhibits abnormal muscle tone. Coordination and gait abnormal. GCS score is 15. Skin: Skin is warm, dry and intact. Bruising noted. No rash noted. He is not diaphoretic. No cyanosis. There is pallor. Nails show clubbing. His skin is gray in color. He is malnourished in appearance. His hydration has improved slightly over the weekend and currently he is more up beat then Friday. His daughter and wife are here today and state he is having a harder time with medication administration problem solving. Psychiatric: Mood and memory normal. His affect is blunt and inappropriate. He expresses impulsivity. He exhibits disordered thought content. Baseline mood and affect unchanged from normal.      Disclaimer:   Mr. Roberto Castaneda has been advised to call or return to our office if symptoms worsen/change/persist. We as a care team including the patient; discussed expected course/resolution/complications of diagnosis in detail today.  Medication risks/benefits/costs/interactions/alternatives discussed Daniel Craft was given an after visit summary which includes diagnoses, current medications, & vitals. Daniel Craft expressed understanding with the diagnosis and plan.

## 2019-06-07 NOTE — PATIENT INSTRUCTIONS
Fatigue: Care Instructions  Your Care Instructions    Fatigue is a feeling of tiredness, exhaustion, or lack of energy. You may feel fatigue because of too much or not enough activity. It can also come from stress, lack of sleep, boredom, and poor diet. Many medical problems, such as viral infections, can cause fatigue. Emotional problems, especially depression, are often the cause of fatigue. Fatigue is most often a symptom of another problem. Treatment for fatigue depends on the cause. For example, if you have fatigue because you have a certain health problem, treating this problem also treats your fatigue. If depression or anxiety is the cause, treatment may help. Follow-up care is a key part of your treatment and safety. Be sure to make and go to all appointments, and call your doctor if you are having problems. It's also a good idea to know your test results and keep a list of the medicines you take. How can you care for yourself at home? · Get regular exercise. But don't overdo it. Go back and forth between rest and exercise. · Get plenty of rest.  · Eat a healthy diet. Do not skip meals, especially breakfast.  · Reduce your use of caffeine, tobacco, and alcohol. Caffeine is most often found in coffee, tea, cola drinks, and chocolate. · Limit medicines that can cause fatigue. This includes tranquilizers and cold and allergy medicines. When should you call for help? Watch closely for changes in your health, and be sure to contact your doctor if:    · You have new symptoms such as fever or a rash.     · Your fatigue gets worse.     · You have been feeling down, depressed, or hopeless. Or you may have lost interest in things that you usually enjoy.     · You are not getting better as expected. Where can you learn more? Go to http://thomas-jason.info/. Enter U515 in the search box to learn more about \"Fatigue: Care Instructions. \"  Current as of: September 23, 2018  Content Version: 11.9  © 2912-4113 [employer]. Care instructions adapted under license by [employer] (which disclaims liability or warranty for this information). If you have questions about a medical condition or this instruction, always ask your healthcare professional. [address] any warranty or liability for your use of this information. Weakness: Care Instructions  Your Care Instructions    Weakness is a lack of physical or muscle strength. You may feel that you need to make extra effort to move your arms, legs, or other muscles. Generalized weakness means that you feel weak in most areas of your body. Another type of weakness may affect just one muscle or group of muscles. You may feel weak and tired after you have done too much activity, such as taking an extra-long hike. This is not a serious problem. It often goes away on its own. Feeling weak can also be caused by medical conditions like thyroid problems, depression, or a virus. Sometimes the cause can be serious. Your doctor may want to do more tests to try to find the cause of the weakness. The doctor has checked you carefully, but problems can develop later. If you notice any problems or new symptoms, get medical treatment right away. Follow-up care is a key part of your treatment and safety. Be sure to make and go to all appointments, and call your doctor if you are having problems. It's also a good idea to know your test results and keep a list of the medicines you take. How can you care for yourself at home? · Rest when you feel tired. · Be safe with medicines. If your doctor prescribed medicine, take it exactly as prescribed. Call your doctor if you think you are having a problem with your medicine. You will get more details on the specific medicines your doctor prescribes. · Do not skip meals. Eating a balanced diet may increase your energy level.   · Get some physical activity every day, but do not get too tired. When should you call for help? Call your doctor now or seek immediate medical care if:    · You have new or worse weakness.     · You are dizzy or lightheaded, or you feel like you may faint.    Watch closely for changes in your health, and be sure to contact your doctor if:    · You do not get better as expected. Where can you learn more? Go to http://thomas-jason.info/. Enter 241 [id number] [id number] in the search box to learn more about \"Weakness: Care Instructions. \"  Current as of: September 23, 2018  Content Version: 11.9  © 7643-0923 [employer]. Care instructions adapted under license by [employer] (which disclaims liability or warranty for this information). If you have questions about a medical condition or this instruction, always ask your healthcare professional. [address] any warranty or liability for your use of this information.

## 2019-06-07 NOTE — PROGRESS NOTES
ADVISED PATIENT OF THE FOLLOWING HEALTH MAINTAINCE DUE  Health Maintenance Due   Topic Date Due    MEDICARE Parke Mull  06/12/2019      Chief Complaint   Patient presents with    Medication Evaluation     Patient here for follow up with medications and hydration. 1. Have you been to the ER, urgent care clinic since your last visit? Hospitalized since your last visit? No    2. Have you seen or consulted any other health care providers outside of the [address] [place] [address] since your last visit? Include any DEXA scan, mammography  or colon screening. No    3. Do you have an Advance Directive on file? yes    4. Do you have a DNR on file? DNR    Patient is accompanied by self I have received verbal consent from Antonio Darden to discuss any/all medical information while they are present in the room. Advance Care Planning 8/13/2018   Patient's Healthcare Decision Maker is: Legal Next of Kin   Primary Decision Maker Name Shasha Bagley   Primary Decision Maker Phone Number [phone]   Primary Decision Maker Relationship to Patient -   Confirm Advance Directive -   Patient Would Like to Complete Advance Directive -         [facility] [employer] [address] [place] [address] Jasmyn WORRELL AT [facility] [address]  [address] [address]  Phone: [phone] Fax: [phone]        Patient reminded during visit to bring all medication bottles, OTC medications to all appointments.

## 2019-06-11 ENCOUNTER — TELEPHONE (OUTPATIENT)
Dept: GERIATRIC MEDICINE | Age: 84
End: 2019-06-11

## 2019-06-12 ENCOUNTER — OFFICE VISIT (OUTPATIENT)
Dept: GERIATRIC MEDICINE | Age: 84
End: 2019-06-12

## 2019-06-12 VITALS
OXYGEN SATURATION: 98 % | RESPIRATION RATE: 20 BRPM | HEIGHT: 68 IN | TEMPERATURE: 97.7 F | SYSTOLIC BLOOD PRESSURE: 110 MMHG | BODY MASS INDEX: 18.19 KG/M2 | DIASTOLIC BLOOD PRESSURE: 58 MMHG | WEIGHT: 120 LBS | HEART RATE: 67 BPM

## 2019-06-12 DIAGNOSIS — C67.4 MALIGNANT NEOPLASM OF POSTERIOR WALL OF URINARY BLADDER (HCC): ICD-10-CM

## 2019-06-12 DIAGNOSIS — Z66 DNR (DO NOT RESUSCITATE): ICD-10-CM

## 2019-06-12 DIAGNOSIS — R06.02 SHORTNESS OF BREATH: ICD-10-CM

## 2019-06-12 DIAGNOSIS — R63.4 EXCESSIVE WEIGHT LOSS: ICD-10-CM

## 2019-06-12 DIAGNOSIS — Z71.89 ACP (ADVANCE CARE PLANNING): ICD-10-CM

## 2019-06-12 DIAGNOSIS — R62.7 ADULT FAILURE TO THRIVE: Primary | ICD-10-CM

## 2019-06-12 DIAGNOSIS — R64 CACHEXIA (HCC): ICD-10-CM

## 2019-06-12 DIAGNOSIS — R69 CHRONIC ILLNESS: ICD-10-CM

## 2019-06-12 DIAGNOSIS — Z00.00 MEDICARE ANNUAL WELLNESS VISIT, SUBSEQUENT: ICD-10-CM

## 2019-06-12 DIAGNOSIS — R53.1 WEAKNESS GENERALIZED: ICD-10-CM

## 2019-06-12 NOTE — PROGRESS NOTES
ADVISED PATIENT OF THE FOLLOWING HEALTH MAINTAINCE DUE  Health Maintenance Due   Topic Date Due    MEDICARE Kenyon Cisneros  06/12/2019      Chief Complaint   Patient presents with    Dehydration     here for f/u     Annual Wellness Visit       1. Have you been to the ER, urgent care clinic since your last visit? Hospitalized since your last visit? No    2. Have you seen or consulted any other health care providers outside of the [address] [place] [address] since your last visit? Include any DEXA scan, mammography  or colon screening. No    3. Do you have an Advance Directive on file? yes    4. Do you have a DNR on file? DNR    Patient is accompanied by self I have received verbal consent from Antonio Darden to discuss any/all medical information while they are present in the room. Advance Care Planning 8/13/2018   Patient's Healthcare Decision Maker is: Legal Next of Kin   Primary Decision Maker Name Narendra Cronin   Primary Decision Maker Phone Number [phone]   Primary Decision Maker Relationship to Patient -   Confirm Advance Directive -   Patient Would Like to Complete Advance Directive -         [facility] [employer] [address] [place] [address], [facility] Jasmyn ROGERS [facility] [address]  [address] [address]  Phone: [phone] Fax: [phone]        Patient reminded during visit to bring all medication bottles, OTC medications to all appointments.

## 2019-06-12 NOTE — PATIENT INSTRUCTIONS
Hospice: Care Instructions Your Care Instructions Hospice care provides medical treatment to relieve symptoms at the end of life. The goal is to keep you comfortable, not to try to cure you. Hospice care does not speed up or lengthen dying. It focuses on easing pain and other symptoms. Hospice caregivers want to enhance your quality of life. Hospice care also offers emotional help and spiritual support when you are dying. It also helps family members care for a loved one who is dying. Hospice care can help you review your life, say important things to family and friends, and explore spiritual issues. Hospice also helps your family and friends deal with their grief after you die. You can use hospice care if your illness cannot be cured and doctors believe you have no more than 6 months to live. You do not need to be confined to a bed or in a hospital to benefit from this type of care. The hospice team includes nurses, counselors, therapists, social workers, pastors, home health aides, and trained volunteers. You can get care in your own home or in a hospice center. Some hospice workers also go to nursing homes or hospitals. How can you care for yourself at home? · Prepare a list of advance directives. These are instructions to your doctor and family members about what kind of care you want if you become unable to speak or express yourself. · Find out if your health insurance covers hospice care. · Find hospice programs in your area. People who can help include your doctor, your state health department, and your insurance company. · Decide what kinds of hospice services you want. It helps to know what you want before you enter a hospice program. 
· Think about some questions when preparing for hospice care. ? Who do you want to make decisions about your medical care if you are not able to? Many people choose their spouse, child, or doctor. ? What are you most afraid of that might happen? You might be afraid of having pain or losing your independence. Let your hospice team know your fears. The team can help you deal with them. ? Where would you prefer to die? Choices include your home, a hospital, or a nursing home. ? Do you want to donate organs when you die? Make sure that your family clearly understands this. ? Do you want any Lutheran rites or practices to be done before you die? Let your hospice team know what you want. Where can you learn more? Go to http://thomas-jason.info/. Enter N073 in the search box to learn more about \"Hospice: Care Instructions. \" Current as of: April 18, 2018 Content Version: 11.9 © 1734-4103 [employer]. Care instructions adapted under license by [employer] (which disclaims liability or warranty for this information). If you have questions about a medical condition or this instruction, always ask your healthcare professional. [address] any warranty or liability for your use of this information.

## 2019-06-12 NOTE — LETTER
6/12/2019 Mr. Pr[address]ay [address] [facility] [address] Dear [facility] Cuming This letter is to inform you that you have been scheduled for an appointment at [address] [place] [address] with Joan Jenkins. [facility] [address] [place] [address],[address] , [address] [facility], [address] [place] [address] Phone: [phone] Fax: [phone] Please bring with you a photo ID, insurance card, co-pay and a list of all medications you are currently taking. If for any reason you need to cancel or reschedule your appointment, please contact the specialists office and they will assist you. [phone] Sincerely, [facility]

## 2019-06-13 ENCOUNTER — TELEPHONE (OUTPATIENT)
Dept: GERIATRIC MEDICINE | Age: 84
End: 2019-06-13

## 2019-06-13 NOTE — TELEPHONE ENCOUNTER
Patients daughter called and wanted to talk with NP. She had a few questions from her phone call with Fatmata Tovar on Yesterday. 1. Did labs show infection. 2. Should patient follow up with cardiology  3. Does patient qualify for Assisted Living. I did advise daughter if Fatmata Tovar did not discuss lab results show any then probaly not. As She wound have discussed with with her but I will ask Fatmata Tovar. Also advised her to call and discuss with [facility] patient being able to move to assisted living as we are not part of that with [facility] and she would have to review patients contract with [facility]. Daughter states she will call and talk with [facility] [employer].  And I will pass this message to Fatmata Tovar

## 2019-06-14 NOTE — TELEPHONE ENCOUNTER
Returned Mrs. Sommer phone call. She has secured [facility] for Mr. Craft and also put [employer] in place for 24/7 care through next week. After that they will assess if they need the care providers to continue or how to move forward. Discussed returning to specialist and the diagnosis of Malignant Neoplasm of the bladder as the admission diagnosis. We have the diagnosis from dr. Forrest Tobar and it fits into the hospice care orders. She is thankful and will be in touch when they need something.

## 2019-06-14 NOTE — PROGRESS NOTES
Discussed with MrZeenat And Mrs. Craft and daughter Frances Vital on 6/12/2019    HGA1 C- 7.1 elevated   CMP- elevated peripheral blood sugar, elevated BUN- probable dehydration; Chloride 109; CO2 18; TP 5.8. Prealbumin- stable   Vitamin D- stable. TIBC- low at 209; Ferritin elevated at 556; Vitamin B12 is elevated at >2000; RBC low 3.4; HGB low at 10.5; HCT low at 31.5.    CEA- 3.0   C AG 19-9 - 22   Sed rate is elevated at 60   C reactive protein is elevated at 32.9   Pro BNP - elevated at 575

## 2019-06-30 NOTE — PROGRESS NOTES
Reason for Visit   Richa Gallardo is a 80 y.o. male patient who presents today for :  Chief Complaint   Patient presents with    Dehydration     here for f/u     Annual Wellness Visit     Follow Up: Follow-up and Dispositions    · Return in about 1 week (around 6/19/2019) for with the NP for follow up to your treatment plan. Diagnosis/Treatment Plan:      Prolonged face to face time documentation:     Start - 1130 a  Stop - 1400   Total Face to Face time = 150 mins        Prolonged face to face time necessary for complex assessment of current acute complaints due to geriatric medicine, patient's fragility, frailness, distress, necessary education for new/advanced disease process. Face to Face time with patient reviewing consult notes and plan of care. Review of treatment plan related to systemic illness. Mr. Quiana Summers has been coming to see me for almost a year now. He has continued to decline in function and independence. He lives in his [address] [place] [address] apartment with his wife here on the [facility]. He has lost a total of 43# in 8 months and is now extremely debilitation. I have tried working him up for a diagnosis of why he was loosing so much weight, strength, & not able to keep his stamina up. The last few weeks we have even tried appetite stimulants, oral steroids and even some IV fluids with Vitamins added. Nothing is working and he is here today for follow up and planning. I have had a lengthy conversation today via the telephoned with is daughter George Zaman that is here in [place] and their main care  side of themselves. Discussed his latest's labs, weight loss, and expected prognosis related to his steady decline. Discussed calling in [facility] for extended care as his wife is not able to provide him physical support.  Mr. & Mrs. Quiana Summers and his daughter George Zaman have the goal in mind that they want to remain in their Independent Living apartment no matter what, with that discussion I brought up the benefit of Hospice as the patient and family are not wishing to pursue a diagnosis further specialist visits as it weighs on their  and father. He wishes to have comfort & quality of life. They are going to meet with [facility] and discuss options as far as care is concerns with focus of quality. Diagnoses and all orders for this visit:    1. Adult failure to thrive  -     DO NOT RESUSCITATE  -     REFERRAL TO HOSPICE    2. Excessive weight loss  -     DO NOT RESUSCITATE  -     REFERRAL TO HOSPICE    3. Cachexia ([address]  -     DO NOT RESUSCITATE  -     REFERRAL TO HOSPICE    4. Shortness of breath  -     DO NOT RESUSCITATE  -     REFERRAL TO HOSPICE    5. Chronic illness  -     DO NOT RESUSCITATE  -     REFERRAL TO HOSPICE    6. Weakness generalized  -     DO NOT RESUSCITATE  -     REFERRAL TO HOSPICE    7. Medicare annual wellness visit, subsequent  -     DO NOT RESUSCITATE  -     WA ANNUAL ALCOHOL SCREEN 15 MIN  -     WA  BENEFIT/RISK AREDS FOR MAC DEGENERATION  -     WA MEDICATION LIST DOCUMENTED IN MEDICAL RECORD  -     WA PRESENCE/ABSENCE URINARY INCONTINENCE ASSESSED  -     WA PT FALLS ASSESS DOC 0-1 FALLS W/OUT INJ PAST YR    8. ACP (advance care planning)  -     DO NOT RESUSCITATE  -     WA ANNUAL ALCOHOL SCREEN 15 MIN  -     WA  BENEFIT/RISK AREDS FOR MAC DEGENERATION  -     WA MEDICATION LIST DOCUMENTED IN MEDICAL RECORD  -     WA PRESENCE/ABSENCE URINARY INCONTINENCE ASSESSED  -     WA PT FALLS ASSESS DOC 0-1 FALLS W/OUT INJ PAST YR    9. DNR (do not resuscitate)  -     DO NOT RESUSCITATE  -     WA ANNUAL ALCOHOL SCREEN 15 MIN  -     WA  BENEFIT/RISK AREDS FOR MAC DEGENERATION  -     WA MEDICATION LIST DOCUMENTED IN MEDICAL RECORD  -     WA PRESENCE/ABSENCE URINARY INCONTINENCE ASSESSED  -     WA PT FALLS ASSESS DOC 0-1 FALLS W/OUT INJ PAST YR    10.  Malignant neoplasm of posterior wall of urinary bladder (HCC)  -     REFERRAL TO HOSPICE      Discontinued Care: The following treatment modalities have been discontinued by the provider today:   Medications Discontinued During This Encounter   Medication Reason    dronabinol (MARINOL) 2.5 mg capsule Therapy Completed    dexamethasone (DECADRON) 4 mg tablet Clinically Ineffective    aspirin delayed-release 81 mg tablet Clinically Ineffective    mirtazapine (REMERON) 7.5 mg tablet Clinically Ineffective     Subjective: Allergies   Allergen Reactions    Ace Inhibitors Hives    Lisinopril Hives     HIVES, ORAL SWELLING     Prior to Admission medications    Medication Sig Start Date End Date Taking? Authorizing Provider   ergocalciferol (ERGOCALCIFEROL) 50,000 unit capsule TAKE ONE CAPSULE BY MOUTH EVERY 7 DAYS 5/31/19  Yes Mabel Colbert NP   carvedilol (COREG) 3.125 mg tablet Take 1 Tab by mouth two (2) times daily (with meals). 3/29/19  Yes Mabel Colbert NP   dutasteride (AVODART) 0.5 mg capsule Take 1 Cap by mouth daily. 3/29/19  Yes Mabel Colbert NP   olmesartan (BENICAR) 40 mg tablet Take 1 Tab by mouth daily. Indications: chronic heart failure, high blood pressure 3/29/19  Yes Mabel Colbert NP   atorvastatin (LIPITOR) 40 mg tablet Take 40 mg by mouth every evening.    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 angina pectoris 11/10/2017    Benign prostatic hyperplasia without lower urinary tract symptoms 11/10/2017    CAD (coronary artery disease)     HX CABG    Cancer ([address]     BLADDER    Carcinoma of lateral wall of urinary bladder ([address]     Chronic pain     Essential (primary) hypertension 11/10/2017    Essential hypertension 6/8/2018    GERD (gastroesophageal reflux disease)     History of colonoscopy 05/22/2014    Hypercholesterolemia     Hyperlipemia 11/10/2017    Microscopic hematuria     Osteoarthritis of left knee 2/1/2011    Other ill-defined conditions(799.89)     BPH     Past Surgical History:   Procedure Laterality Date    CARDIAC SURG PROCEDURE UNLIST  1999    CABG    HX CATARACT REMOVAL Bilateral 07/19/2016    HX COLONOSCOPY      HX GI      ESOPH DIL.  HX HEENT      TONSILLECTOMY    HX ORTHOPAEDIC  2008    R KNEE REPLACE    HX ORTHOPAEDIC  2004    ARTHROSCOPY R KNEE    HX ORTHOPAEDIC  2002    ARTHROSCOPY L KNEE    HX OTHER SURGICAL      ENDOSCOPY-X2    HX PROSTATECTOMY      HX TURP  11/02/2015    HX UROLOGICAL  11/02/2015    Transurethral resection of bladder tumor    TOTAL KNEE ARTHROPLASTY  2011    LEFT      Social History     Tobacco Use    Smoking status: Never Smoker    Smokeless tobacco: Never Used   Substance Use Topics    Alcohol use:  Yes     Alcohol/week: 2.0 oz     Types: 4 Glasses of wine per week     Comment: 4-5 OZ WINE DAILY    Drug use: No      Family History   Problem Relation Age of Onset    Heart Disease Father     Cancer Mother    Lauro Gins Problems Neg Hx      Advance Care Planning 8/13/2018   Patient's Healthcare Decision Maker is: Legal Next of Dary 69   Primary Decision Maker Name Alpha Points   Primary Decision Maker Phone Number [phone]   Primary Decision Maker Relationship to Patient -   Confirm Advance Directive -   Patient Would Like to Complete Advance Directive -     Test Results:     Office Visit on 06/05/2019   Component Date Value Ref Range Status    Hemoglobin A1c 06/05/2019 7.1* 4.8 - 5.6 % Final    Comment:          Prediabetes: 5.7 - 6.4           Diabetes: >6.4           Glycemic control for adults with diabetes: <7.0      Estimated average glucose 06/05/2019 157  mg/dL Final    Glucose 06/05/2019 140* 65 - 99 mg/dL Final    BUN 06/05/2019 40* 10 - 36 mg/dL Final    Creatinine 06/05/2019 1.01  0.76 - 1.27 mg/dL Final    GFR est non-AA 06/05/2019 65  >59 mL/min/1.73 Final    GFR est AA 06/05/2019 75  >59 mL/min/1.73 Final    BUN/Creatinine ratio 06/05/2019 40* 10 - 24 Final    Sodium 06/05/2019 141  134 - 144 mmol/L Final    Potassium 06/05/2019 4.3  3.5 - 5.2 mmol/L Final    Chloride 06/05/2019 109* 96 - 106 mmol/L Final    CO2 06/05/2019 18* 20 - 29 mmol/L Final    Calcium 06/05/2019 8.6  8.6 - 10.2 mg/dL Final    Protein, total 06/05/2019 5.8* 6.0 - 8.5 g/dL Final    Albumin 06/05/2019 3.3  3.2 - 4.6 g/dL Final    GLOBULIN, TOTAL 06/05/2019 2.5  1.5 - 4.5 g/dL Final    A-G Ratio 06/05/2019 1.3  1.2 - 2.2 Final    Bilirubin, total 06/05/2019 0.4  0.0 - 1.2 mg/dL Final    Alk. phosphatase 06/05/2019 63  39 - 117 IU/L Final    AST (SGOT) 06/05/2019 24  0 - 40 IU/L Final    ALT (SGPT) 06/05/2019 35  0 - 44 IU/L Final    PROBNP 06/05/2019 575* 0 - 486 pg/mL Final    Comment: The following cut-points have been suggested for the  use of proBNP for the diagnostic evaluation of heart  failure (HF) in patients with acute dyspnea:  Modality                     Age           Optimal Cut                             (years)            Point  ------------------------------------------------------  Diagnosis (rule in HF)        <50            450 pg/mL                            50 - 75            900 pg/mL                                >75           1800 pg/mL  Exclusion (rule out HF)  Age independent     300 pg/mL      Prealbumin 06/05/2019 24  9 - 32 mg/dL Final    VITAMIN D, 25-HYDROXY 06/05/2019 43.7  30.0 - 100.0 ng/mL Final    Comment: Vitamin D deficiency has been defined by the [place] of  Medicine and an Endocrine Society practice guideline as a  level of serum 25-OH vitamin D less than 20 ng/mL (1,2). The Endocrine Society went on to further define vitamin D  insufficiency as a level between 21 and 29 ng/mL (2). 1. IOM ([place] of Medicine). 2010. Dietary reference     intakes for calcium and D. [facility]: The     ArthroCAD.   2. Jaelyn GUARDADO, Marisol KAPLAN, Vidya LUA, et al.     Evaluation, treatment, and prevention of vitamin D deficiency: an Endocrine Society clinical practice     guideline. JCEM. 2011 Jul; 96(7):1911-30.  TIBC 06/05/2019 209* 250 - 450 ug/dL Final    UIBC 06/05/2019 137  111 - 343 ug/dL Final    Iron 06/05/2019 72  38 - 169 ug/dL Final    Iron % saturation 06/05/2019 34  15 - 55 % Final    Ferritin 06/05/2019 556* 30 - 400 ng/mL Final    Vitamin B12 06/05/2019 >2000* 232 - 1245 pg/mL Final    Folate 06/05/2019 17.2  >3.0 ng/mL Final    Comment: A serum folate concentration of less than 3.1 ng/mL is  considered to represent clinical deficiency.  WBC 06/05/2019 7.7  3.4 - 10.8 x10E3/uL Final    RBC 06/05/2019 3.40* 4.14 - 5.80 x10E6/uL Final    HGB 06/05/2019 10.5* 13.0 - 17.7 g/dL Final    HCT 06/05/2019 31.4* 37.5 - 51.0 % Final    MCV 06/05/2019 92  79 - 97 fL Final    MCH 06/05/2019 30.9  26.6 - 33.0 pg Final    MCHC 06/05/2019 33.4  31.5 - 35.7 g/dL Final    RDW 06/05/2019 15.4  12.3 - 15.4 % Final    PLATELET 06/70/6437 050  150 - 450 x10E3/uL Final    NEUTROPHILS 06/05/2019 74  Not Estab. % Final    Lymphocytes 06/05/2019 13  Not Estab. % Final    MONOCYTES 06/05/2019 8  Not Estab. % Final    EOSINOPHILS 06/05/2019 1  Not Estab. % Final    BASOPHILS 06/05/2019 0  Not Estab. % Final    ABS. NEUTROPHILS 06/05/2019 5.7  1.4 - 7.0 x10E3/uL Final    Abs Lymphocytes 06/05/2019 1.0  0.7 - 3.1 x10E3/uL Final    ABS. MONOCYTES 06/05/2019 0.6  0.1 - 0.9 x10E3/uL Final    ABS. EOSINOPHILS 06/05/2019 0.1  0.0 - 0.4 x10E3/uL Final    ABS. BASOPHILS 06/05/2019 0.0  0.0 - 0.2 x10E3/uL Final    IMMATURE GRANULOCYTES 06/05/2019 4  Not Estab. % Final    ABS. IMM. GRANS. 06/05/2019 0.3* 0.0 - 0.1 x10E3/uL Final    Comment: (An elevated percentage of Immature Granulocytes has not been found  to be clinically significant as a sole clinical predictor of disease. Does NOT include bands or blast cells.   Pregnancy associated  physiological leukocytosis may also show increased immature  granulocytes without clinical significance.)      Reticulocyte count 06/05/2019 1.4  0.6 - 2.6 % Final    CEA 06/05/2019 3.0  0.0 - 4.7 ng/mL Final    Comment:                              Nonsmokers          <3.9                               Smokers             <5.6  [employer] Electrochemiluminescence Immunoassay  (ECLIA)  Values obtained with different assay methods or kits  cannot be used interchangeably. Results cannot be  interpreted as absolute evidence of the presence or  absence of malignant disease.  Glucose POC 06/05/2019 126  mg/dL Final    Carbohydrate Antigen 19-9, (CA 19-* 06/05/2019 22  0 - 35 U/mL Final    Comment: [employer] Electrochemiluminescence Immunoassay (ECLIA)  Values obtained with different assay methods or kits cannot be  used interchangeably. Results cannot be interpreted as absolute  evidence of the presence or absence of malignant disease.  Sed rate (ESR) 06/05/2019 60* 0 - 30 mm/hr Final    C-Reactive Protein, Qt 06/05/2019 32.9* 0.0 - 4.9 mg/L Final    Comment:                **Effective June 17, 2019 the reference interval for**                   C-Reactive Protein, Quant, will be changing to: Age             Male         Female                                0  - 30 days    Not Estab.    Not Estab.                           1 month - 17 years     0 -  7       0 -  9                                    >17 years     0 - 10       0 - 10     Office Visit on 05/24/2019   Component Date Value Ref Range Status    Color (UA POC) 05/24/2019 Yellow   Final    Clarity (UA POC) 05/24/2019 Clear   Final    Glucose (UA POC) 05/24/2019 Negative  Negative Final    Bilirubin (UA POC) 05/24/2019 Negative  Negative Final    Ketones (UA POC) 05/24/2019 Negative  Negative Final    Specific gravity (UA POC) 05/24/2019 1.005  1.001 - 1.035 Final    Blood (UA POC) 05/24/2019 3+  Negative Final    pH (UA POC) 05/24/2019 5.0  4.6 - 8.0 Final    Protein (UA POC) 05/24/2019 Positive  Negative Final    Urobilinogen (UA POC) 05/24/2019 normal  0.2 - 1 Final    Nitrites (UA POC) 05/24/2019 Negative  Negative Final    Leukocyte esterase (UA POC) 05/24/2019 3+  Negative Final     Objective:     Vitals:    06/12/19 1137   BP: 110/58   Pulse: 67   Resp: 20   Temp: 97.7 °F (36.5 °C)   TempSrc: Oral   SpO2: 98%   Weight: 120 lb (54.4 kg)   Height: 5' 8\" (1.727 m)     Wt Readings from Last 3 Encounters:   06/12/19 120 lb (54.4 kg)   06/07/19 121 lb 9.6 oz (55.2 kg)   06/05/19 122 lb (55.3 kg)     BP Readings from Last 3 Encounters:   06/12/19 110/58   06/07/19 118/62   06/05/19 134/66     Review of Systems   Constitutional: Positive for activity change, appetite change, fatigue and unexpected weight change. Negative for chills and fever. HENT: Positive for trouble swallowing. Negative for congestion, dental problem, hearing loss, postnasal drip, sinus pressure, sneezing and sore throat. Eyes: Negative for discharge, redness and visual disturbance. Respiratory: Positive for cough and shortness of breath. Negative for apnea, chest tightness and wheezing. Cardiovascular: Positive for palpitations. Negative for chest pain and leg swelling. Gastrointestinal: Negative for abdominal distention, abdominal pain, blood in stool, constipation, diarrhea, nausea and vomiting. Endocrine: Positive for polyuria. Negative for polydipsia and polyphagia. Genitourinary: Positive for frequency and urgency. Negative for flank pain. Musculoskeletal: Positive for arthralgias, back pain, gait problem, joint swelling, myalgias, neck pain and neck stiffness. Skin: Negative for color change, pallor, rash and wound. Allergic/Immunologic: Positive for environmental allergies. Negative for food allergies. Neurological: Positive for dizziness, weakness, light-headedness and headaches. Negative for tremors and numbness. Hematological: Negative for adenopathy. Bruises/bleeds easily. Psychiatric/Behavioral: Positive for confusion. Negative for agitation and sleep disturbance. The patient is not nervous/anxious. Physical Exam   Constitutional: Vital signs are normal. He appears malnourished and dehydrated. He appears to not be writhing in pain. He appears unhealthy. He appears cachectic. He appears toxic. He has a sickly appearance. No distress. Thin, frail, fragile in appearing, elderly,  male. He is very frail. Sunken appearance to face. Loss of muscle tone. Emaciated. He also has a gray, pale appearance to his skin. HENT:   Head: Normocephalic and atraumatic. Right Ear: External ear and ear canal normal. A middle ear effusion is present. Decreased hearing is noted. Left Ear: External ear and ear canal normal. A middle ear effusion is present. Decreased hearing is noted. Nose: Mucosal edema and rhinorrhea present. Mouth/Throat: Uvula is midline. Mucous membranes are not pale, dry and not cyanotic. No oral lesions. No uvula swelling. Posterior oropharyngeal erythema present. No posterior oropharyngeal edema. Tenting of skin with moderate tongue furrowing present    Eyes: Pupils are equal, round, and reactive to light. Conjunctivae, EOM and lids are normal. Lids are everted and swept, no foreign bodies found. Neck: Trachea normal. No JVD present. Spinous process tenderness and muscular tenderness present. Neck rigidity present. Decreased range of motion present. No thyromegaly present. Cardiovascular: S1 normal, S2 normal, intact distal pulses and normal pulses. An irregularly irregular rhythm present. Bradycardia present. Exam reveals distant heart sounds. Exam reveals no gallop and no friction rub. No murmur heard. No extremity edema is present during today's exam.    Pulmonary/Chest: Effort normal and breath sounds normal.   Upper airway congestion.  Seems to be seasonal allergies as his eyes are watering as well and throat has drainage from his nose. Abdominal: Soft. Normal appearance and normal aorta. Bowel sounds are tinkling. There is no hepatosplenomegaly. There is no tenderness. There is no rigidity, no guarding and no CVA tenderness. He reports no appetitive as well as increased weight loss. Genitourinary: Rectum normal, testes/scrotum normal and penis normal.   Genitourinary Comments: Frequency, urgency, constant urge to urinate. Pain in lower abdomen that radiates across from the right flank to the left inguinal area when lying down. Musculoskeletal:        Right shoulder: He exhibits decreased range of motion, swelling, effusion, crepitus, deformity, pain, abnormal pulse and decreased strength. Left shoulder: He exhibits decreased range of motion, tenderness, bony tenderness, swelling, crepitus, deformity, pain, spasm and decreased strength. Generalized chronic extremity weakness is present, he is using a cane to ambulate. He is weak in all extremities and has no range of motion in either right or left shoulder. He states this is chronic. Lymphadenopathy:        Head (right side): No submandibular, no tonsillar and no posterior auricular adenopathy present. Head (left side): No submandibular, no tonsillar and no posterior auricular adenopathy present. He has no cervical adenopathy. Right: Inguinal adenopathy present. Palpable right inguinal swelling mid inguinal region. Round, hard, attached. No pain with palpation. CT results noted the lymph node to be 4 cm x 2 cm. Neurological: He is alert. He has normal reflexes and intact cranial nerves. He is disoriented. He displays weakness, atrophy, abnormal stance and abnormal speech. A sensory deficit is present. He exhibits abnormal muscle tone. Coordination and gait abnormal. GCS score is 15. Skin: Skin is warm, dry and intact. Bruising noted. No rash noted. He is not diaphoretic. No cyanosis. There is pallor. Nails show clubbing.    His skin is gray in color. He is malnourished in appearance. His hydration has improved slightly over the weekend and currently he is more up beat then Friday. His daughter and wife are here today and state he is having a harder time with medication administration problem solving. Psychiatric: Mood and memory normal. His affect is blunt and inappropriate. He expresses impulsivity. He exhibits disordered thought content. Baseline mood and affect unchanged from normal.      Disclaimer:   Mr. Clemens Sender has been advised to call or return to our office if symptoms worsen/change/persist. We as a care team including the patient; discussed expected course/resolution/complications of diagnosis in detail today. Medication risks/benefits/costs/interactions/alternatives discussed Daniel REBECCAZeenat Craft was given an after visit summary which includes diagnoses, current medications, & vitals. Daniel Craft expressed understanding with the diagnosis and plan.

## 2019-06-30 NOTE — PROGRESS NOTES
Reason For Visit:     Chief Complaint   Patient presents with    Dehydration     here for f/u     Annual Wellness Visit     Kasey Garner is a 80 y.o. male who presents for an annual Medicare Wellness Visit. Patient History:   PSH: Reviewed with patient  Past Surgical History:   Procedure Laterality Date    CARDIAC SURG PROCEDURE UNLIST  1999    CABG    HX CATARACT REMOVAL Bilateral 07/19/2016    HX COLONOSCOPY      HX GI      ESOPH DIL.  HX HEENT      TONSILLECTOMY    HX ORTHOPAEDIC  2008    R KNEE REPLACE    HX ORTHOPAEDIC  2004    ARTHROSCOPY R KNEE    HX ORTHOPAEDIC  2002    ARTHROSCOPY L KNEE    HX OTHER SURGICAL      ENDOSCOPY-X2    HX PROSTATECTOMY      HX TURP  11/02/2015    HX UROLOGICAL  11/02/2015    Transurethral resection of bladder tumor    TOTAL KNEE ARTHROPLASTY  2011    LEFT      SH: Reviewed with patient  Social History     Tobacco Use    Smoking status: Never Smoker    Smokeless tobacco: Never Used   Substance Use Topics    Alcohol use: Yes     Alcohol/week: 2.0 oz     Types: 4 Glasses of wine per week     Comment: 4-5 OZ WINE DAILY    Drug use: No     FH: Reviewed with patient  Family History   Problem Relation Age of Onset    Heart Disease Father     Cancer Mother     Anesth Problems Neg Hx      Medications/Allergies: Reviewed with patient. Current Outpatient Medications on File Prior to Visit   Medication Sig Dispense Refill    ergocalciferol (ERGOCALCIFEROL) 50,000 unit capsule TAKE ONE CAPSULE BY MOUTH EVERY 7 DAYS 12 Cap 3    carvedilol (COREG) 3.125 mg tablet Take 1 Tab by mouth two (2) times daily (with meals). 180 Tab 1    dutasteride (AVODART) 0.5 mg capsule Take 1 Cap by mouth daily. 90 Cap 1    olmesartan (BENICAR) 40 mg tablet Take 1 Tab by mouth daily. Indications: chronic heart failure, high blood pressure 90 Tab 1    atorvastatin (LIPITOR) 40 mg tablet Take 40 mg by mouth every evening.        No current facility-administered medications on file prior to visit. Allergies   Allergen Reactions    Ace Inhibitors Hives    Lisinopril Hives     HIVES, ORAL SWELLING     Patient Care Team:  Chuy Paulino NP as PCP - General (Nurse Practitioner)  Jean Rudd MD as Physician (Cardiology)  Andres Rm MD as Consulting Provider (Plastic Surgery)  Twan Castle MD as Physician (Urology)  Tasneem Tyler MD as Physician (Orthopedic Surgery)  Majo Marcelo MD as Physician (Cardiothoracic Surgery)  Ajit Delgado MD as Surgeon (Orthopedic Surgery)  Objective:     Visit Vitals  /58 (BP 1 Location: Left arm, BP Patient Position: Sitting)   Pulse 67   Temp 97.7 °F (36.5 °C) (Oral)   Resp 20   Ht 5' 8\" (1.727 m)   Wt 120 lb (54.4 kg)   SpO2 98%   BMI 18.25 kg/m²    Body mass index is 18.25 kg/m². Last Weight Metrics:  Weight Loss Metrics 2019   Today's Wt 120 lb 121 lb 9.6 oz 122 lb 121 lb 8 oz 122 lb 122 lb 123 lb   BMI 18.25 kg/m2 18.49 kg/m2 18.55 kg/m2 18.47 kg/m2 18.55 kg/m2 18.55 kg/m2 18.7 kg/m2     No physical exam was performed today per Medicare Wellness Guidelines.    Health Maintenance:   Daily Aspirin: yes   Immunizations: stated as up to date, no records available  Health Maintenance reviewed   Health Maintenance Due   Topic Date Due    MEDICARE YEARLY EXAM  2019      Functional Assessment:   ALCOHOL SCREENING:   How often do you have a drink containing alcohol: Monthly or less  How many drinks do you have on a typical day when you are drinkin or 2  How often do you have 6 or more drinks on one occasion: Never  How often during the last year have you found that you were not able to stop drinking once you had started: Never  How often during the last year have you failed to do what was normally expected from you because of drinking: Never  How often during the last year have you needed a first drink in the morning to get yourself going after a heavy drinking session: Never  How often during the last year have you had a feeling of guilt or remorse after drinking: Never  How often during the last year have you been unable to remember what happened the night before because you had been drinking: Never  Have you or someone else been injured as a result of your drinking: Never  Have you or someone else been injured as a result of your drinking: Never  Has a relative or friend, or a doctor or other health worker been concerned about your drinking or suggested you cut down: Never  AUDIT Score: 2     ADL FUNCTIONALITY  ADL Assessment 6/12/2019   Feeding yourself No Help Needed   Getting from bed to chair No Help Needed   Getting dressed No Help Needed   Bathing or showering No Help Needed   Walk across the room (includes cane/walker) Help Needed   Using the telphone Help Needed   Taking your medications No Help Needed   Preparing meals Help Needed   Managing money (expenses/bills) Help Needed   Moderately strenuous housework (laundry) Help Needed   Shopping for personal items (toiletries/medicines) Help Needed   Shopping for groceries Help Needed   Driving Help Needed   Climbing a flight of stairs Help Needed   Getting to places beyond walking distances Help Needed     DEPRESSION SCREENING:   3 most recent PHQ Screens 6/12/2019   Little interest or pleasure in doing things Not at all   Feeling down, depressed, irritable, or hopeless Not at all   Total Score PHQ 2 0   Trouble falling or staying asleep, or sleeping too much Several days   Feeling tired or having little energy Nearly every day   Poor appetite, weight loss, or overeating More than half the days   Feeling bad about yourself - or that you are a failure or have let yourself or your family down Not at all   Trouble concentrating on things such as school, work, reading, or watching TV Not at all   Moving or speaking so slowly that other people could have noticed; or the opposite being so fidgety that others notice Not at all   Thoughts of being better off dead, or hurting yourself in some way Not at all   PHQ 9 Score 6   How difficult have these problems made it for you to do your work, take care of your home and get along with others -      [address] Exam 6/12/2019 6/11/2018   What is the Year 1 1   What is the Season 1 1   What is the Date 0 1   What is the Day 0 1   What is the Month 1 1   Where are we State 1 1   Where are we Country 1 1   Where are we [address] [place] [address] or [facility] 1 1   Where are we Floor 1 1   Name three objects, then ask the patient to say them 3 3   Serial sevens Subtract 7 from 100 in increments 2 3   Ask for the three objects repeated above 2 3   Name a pencil 1 1   Name a watch 1 1   Have the patient repeat this phrase \"No ifs, ands, or buts\" 1 1   Three stage command: Take the paper in your right hand 1 1   Fold the paper in half 1 1   Put the paper on the floor 1 1   Read and obey the following: CLOSE YOUR EYES 1 0   Have the patient write a sentence 1 1   Have the patient copy a figure 1 1   Mini Mental Score 24 27     FALL RISK:   Fall Risk Assessment, last 12 mths 6/12/2019   Able to walk? Yes   Fall in past 12 months? Yes   Fall with injury? No   Number of falls in past 12 months 1   Fall Risk Score 1      ABUSE SCREEN:   Abuse Screening Questionnaire 6/12/2019   Do you ever feel afraid of your partner? N   Are you in a relationship with someone who physically or mentally threatens you? N   Is it safe for you to go home? Y      HEARING SCREEN:wears hearing aides  NUTRITION SCREEN: poor, failure to thrive, not eating   What are the patient's living arrangements - the patient lives with their spouse. This patient resides in [place] [address] on the [place] [facility].    Does the patient use any ambulatory aids: yes If so, what type: Rollator   Does the patient exhibit a steady gait?  no    Is the patient self reliant?  (ie can do own laundry, meals, household chores)  no    Does the patient handle his/her own medications?  no   Does the patient handle his/her own money? no   Is the patients home safe (ie good lighting, handrails on stairs and bath, etc.)? yes   Did you notice or did patient express any hearing difficulties? yes   Did you notice or did patient express any vision difficulties? yes   Were distance and reading eye charts used? yes     Advance Care Planning & Durable Do Not Resuscitate :      Advance Care Planning 8/13/2018   Patient's Healthcare Decision Maker is: Legal Next of Kin   Primary Decision Maker Name [facility]   Primary Decision Maker Phone Number [phone]   Primary Decision Maker Relationship to Patient -   Confirm Advance Directive -   Patient Would Like to Complete Advance Directive -      Date of ACP Conversation: 06/30/19  Location of Conversation: In Office Visit 06/30/19. Persons included in Conversation:  patient and family   Length of ACP Conversation in minutes:  25 minutes  Is the patient deemed incompetent to make his/her health decisions: no  Authorized Decision Maker: Healthcare Agent/Medical Power of  under Advance Directive   Conversation: The following was discussed with the Mr.Boyce REBECCA Craft with time given to answer questions concerning advance care planning:  Understanding of medical condition    Understanding of CPR, goals and expected outcomes, benefits and burdens discussed. Information on CPR success rates provided (e.g. for CPR in hospital, survival to d/c at two weeks is 22%, for chronically ill or elderly/frail survival is less than 3%); Individual asked to communicate understanding of information in his/her own words. Explored fears and concerns regarding CPR or possible outcomes   Provided ACP educational materials: Understanding Advance Directives by 2025 Devika Looney (www. [employer].org)    Reviewed existing Advance Directive   Recommended communicating the plan and making copies for the healthcare agent, personal physician, and others as appropriate (e.g., health system)  Recommended review of completed ACP document annually or upon change in health status  Reviewed current DDNR, no changes per patient & spouse. Referrals:      Consult Hospice due to patient's failing health. See office visit note from today. Treatment Plan: The following medication/treatments have been discontinued by the provider today after performing detailed medication reconciliation with patient:   Medications Discontinued During This Encounter   Medication Reason    dronabinol (MARINOL) 2.5 mg capsule Therapy Completed    dexamethasone (DECADRON) 4 mg tablet Clinically Ineffective    aspirin delayed-release 81 mg tablet Clinically Ineffective    mirtazapine (REMERON) 7.5 mg tablet Clinically Ineffective       Disclaimer: This is an [facility] Exam (AWV). Patient verbalized understanding and agreement with the plan. A copy of the After Visit Summary with personalized health plan was given to the patient today. Greater than 30 minutes was spent with patient discussing advance directives, wellness initiatives, and preventative measures of their health. I have reviewed the patient's medical history in detail and updated the computerized patient record.        Ashley Toussaint RN, MSN, [address] [facility]   Acute Care/Adult Gerontology Nurse Practitioner   [facility]   [address] [facility] [place]   [place], [address] [place] [address]  [phone] (office)   [phone] (cell)   [phone] (office fax)